# Patient Record
Sex: FEMALE | Race: BLACK OR AFRICAN AMERICAN | Employment: OTHER | URBAN - METROPOLITAN AREA
[De-identification: names, ages, dates, MRNs, and addresses within clinical notes are randomized per-mention and may not be internally consistent; named-entity substitution may affect disease eponyms.]

---

## 2017-01-12 ENCOUNTER — APPOINTMENT (OUTPATIENT)
Dept: GENERAL RADIOLOGY | Facility: HOSPITAL | Age: 82
End: 2017-01-12
Attending: EMERGENCY MEDICINE
Payer: MEDICARE

## 2017-01-12 ENCOUNTER — HOSPITAL ENCOUNTER (EMERGENCY)
Facility: HOSPITAL | Age: 82
Discharge: HOME | End: 2017-01-12
Attending: EMERGENCY MEDICINE
Payer: MEDICARE

## 2017-01-12 VITALS
WEIGHT: 167 LBS | BODY MASS INDEX: 30.73 KG/M2 | SYSTOLIC BLOOD PRESSURE: 132 MMHG | DIASTOLIC BLOOD PRESSURE: 68 MMHG | RESPIRATION RATE: 18 BRPM | TEMPERATURE: 98 F | HEART RATE: 74 BPM | HEIGHT: 62 IN | OXYGEN SATURATION: 95 %

## 2017-01-12 DIAGNOSIS — M10.9 ACUTE GOUT INVOLVING TOE OF RIGHT FOOT, UNSPECIFIED CAUSE: ICD-10-CM

## 2017-01-12 DIAGNOSIS — I95.1 ORTHOSTASIS: Primary | ICD-10-CM

## 2017-01-12 LAB
ANION GAP SERPL CALC-SCNC: 13 MMOL/L (ref 0–18)
BACTERIA UR QL AUTO: NEGATIVE /HPF
BASOPHILS # BLD: 0.1 K/UL (ref 0–0.2)
BASOPHILS NFR BLD: 1 %
BILIRUB UR QL: NEGATIVE
BNP SERPL-MCNC: 930 PG/ML (ref 0–100)
BUN SERPL-MCNC: 20 MG/DL (ref 8–20)
BUN/CREAT SERPL: 13.8 (ref 10–20)
CALCIUM SERPL-MCNC: 9.2 MG/DL (ref 8.5–10.5)
CHLORIDE SERPL-SCNC: 107 MMOL/L (ref 95–110)
CO2 SERPL-SCNC: 21 MMOL/L (ref 22–32)
COLOR UR: YELLOW
CREAT SERPL-MCNC: 1.45 MG/DL (ref 0.5–1.5)
EOSINOPHIL # BLD: 0 K/UL (ref 0–0.7)
EOSINOPHIL NFR BLD: 0 %
ERYTHROCYTE [DISTWIDTH] IN BLOOD BY AUTOMATED COUNT: 13.2 % (ref 11–15)
GLUCOSE BLDC GLUCOMTR-MCNC: 169 MG/DL (ref 70–99)
GLUCOSE SERPL-MCNC: 180 MG/DL (ref 70–99)
GLUCOSE UR-MCNC: NEGATIVE MG/DL
HCT VFR BLD AUTO: 29.8 % (ref 35–48)
HGB BLD-MCNC: 9.5 G/DL (ref 12–16)
HGB UR QL STRIP.AUTO: NEGATIVE
HYALINE CASTS #/AREA URNS AUTO: 4 /LPF
LEUKOCYTE ESTERASE UR QL STRIP.AUTO: NEGATIVE
LYMPHOCYTES # BLD: 1.1 K/UL (ref 1–4)
LYMPHOCYTES NFR BLD: 8 %
MCH RBC QN AUTO: 27.1 PG (ref 27–32)
MCHC RBC AUTO-ENTMCNC: 31.8 G/DL (ref 32–37)
MCV RBC AUTO: 85.4 FL (ref 80–100)
MONOCYTES # BLD: 0.5 K/UL (ref 0–1)
MONOCYTES NFR BLD: 4 %
NEUTROPHILS # BLD AUTO: 11.5 K/UL (ref 1.8–7.7)
NEUTROPHILS NFR BLD: 87 %
NITRITE UR QL STRIP.AUTO: NEGATIVE
OSMOLALITY UR CALC.SUM OF ELEC: 299 MOSM/KG (ref 275–295)
PH UR: 5 [PH] (ref 5–8)
PLATELET # BLD AUTO: 221 K/UL (ref 140–400)
PMV BLD AUTO: 10.1 FL (ref 7.4–10.3)
POTASSIUM SERPL-SCNC: 4.8 MMOL/L (ref 3.3–5.1)
PROT UR-MCNC: 30 MG/DL
RBC # BLD AUTO: 3.48 M/UL (ref 3.7–5.4)
RBC #/AREA URNS AUTO: 1 /HPF
SODIUM SERPL-SCNC: 141 MMOL/L (ref 136–144)
SP GR UR STRIP: 1.02 (ref 1–1.03)
TROPONIN I SERPL-MCNC: 0.03 NG/ML (ref ?–0.03)
UROBILINOGEN UR STRIP-ACNC: <2
VIT C UR-MCNC: NEGATIVE MG/DL
WBC # BLD AUTO: 13.1 K/UL (ref 4–11)
WBC #/AREA URNS AUTO: 1 /HPF

## 2017-01-12 PROCEDURE — 82962 GLUCOSE BLOOD TEST: CPT

## 2017-01-12 PROCEDURE — 71010 XR CHEST AP PORTABLE  (CPT=71010): CPT

## 2017-01-12 PROCEDURE — 84484 ASSAY OF TROPONIN QUANT: CPT | Performed by: EMERGENCY MEDICINE

## 2017-01-12 PROCEDURE — 81001 URINALYSIS AUTO W/SCOPE: CPT | Performed by: EMERGENCY MEDICINE

## 2017-01-12 PROCEDURE — 93005 ELECTROCARDIOGRAM TRACING: CPT

## 2017-01-12 PROCEDURE — 99285 EMERGENCY DEPT VISIT HI MDM: CPT

## 2017-01-12 PROCEDURE — 83880 ASSAY OF NATRIURETIC PEPTIDE: CPT | Performed by: EMERGENCY MEDICINE

## 2017-01-12 PROCEDURE — 80048 BASIC METABOLIC PNL TOTAL CA: CPT | Performed by: EMERGENCY MEDICINE

## 2017-01-12 PROCEDURE — 93010 ELECTROCARDIOGRAM REPORT: CPT | Performed by: EMERGENCY MEDICINE

## 2017-01-12 PROCEDURE — 96360 HYDRATION IV INFUSION INIT: CPT

## 2017-01-12 PROCEDURE — 96361 HYDRATE IV INFUSION ADD-ON: CPT

## 2017-01-12 PROCEDURE — 85025 COMPLETE CBC W/AUTO DIFF WBC: CPT | Performed by: EMERGENCY MEDICINE

## 2017-01-12 RX ORDER — TRAMADOL HYDROCHLORIDE 50 MG/1
50 TABLET ORAL ONCE
Status: COMPLETED | OUTPATIENT
Start: 2017-01-12 | End: 2017-01-12

## 2017-01-12 RX ORDER — TRAMADOL HYDROCHLORIDE 50 MG/1
50 TABLET ORAL EVERY 8 HOURS PRN
Qty: 15 TABLET | Refills: 0 | Status: SHIPPED | OUTPATIENT
Start: 2017-01-12 | End: 2017-01-17

## 2017-01-12 RX ORDER — SODIUM CHLORIDE 9 MG/ML
INJECTION, SOLUTION INTRAVENOUS ONCE
Status: COMPLETED | OUTPATIENT
Start: 2017-01-12 | End: 2017-01-12

## 2017-01-12 RX ORDER — INDOMETHACIN 50 MG/1
50 CAPSULE ORAL
Qty: 15 CAPSULE | Refills: 0 | Status: SHIPPED | OUTPATIENT
Start: 2017-01-12 | End: 2017-01-17

## 2017-01-13 NOTE — ED PROVIDER NOTES
Patient Seen in: [facility] AND [facility] Emergency Department    History   Patient presents with:  Syncope (cardiovascular, neurologic)    Stated Complaint: syncope      HPI    81 yo F with PMH NIDDM, NICM (20% EF), HTN, HL, gout presenting for evaluation of zac Tartrate-Timolol (COMBIGAN) 0.2-0.5 % Ophthalmic Solution,  Apply  to eye.   latanoprost (XALATAN) 0.005 % Ophthalmic Solution,  Apply  to eye. aspirin (ECOTRIN LOW STRENGTH) 81 MG Oral Tab EC,  Take  by mouth.    Ferrous Gluconate 324 (38 FE) MG Oral Tab CO2 21 (*)     Calculated Osmolality 299 (*)     GFR, Non- 35 (*)     GFR, -American 42 (*)     All other components within normal limits   BNP (B TYPE NATRIUERTIC PEPTIDE) - Abnormal; Notable for the following:     Beta Natriuretic [facility], X CHEST PORTABLE, 10/28/2014, 15:35. [facility], X CHEST PA LAT ROUTINE, 10/22/2014, 11:25. INDICATIONS: Weakness and confusion today. TECHNIQUE:   Single view.    FINDINGS:  CARDIAC/VASC: The heart is taking these medications    TraMADol HCl 50 MG Oral Tab  Take 1 tablet (50 mg total) by mouth every 8 (eight) hours as needed for Pain.   Qty: 15 tablet Refills: 0

## 2017-01-13 NOTE — ED INITIAL ASSESSMENT (HPI)
Per ems, patient had a syncopal episode. Family assisted pt to ground. Hx of chf, stents, gout. Denies chest pain and sob.

## 2017-03-24 ENCOUNTER — HOSPITAL ENCOUNTER (EMERGENCY)
Facility: HOSPITAL | Age: 82
Discharge: HOME | End: 2017-03-24
Attending: EMERGENCY MEDICINE
Payer: MEDICARE

## 2017-03-24 VITALS
DIASTOLIC BLOOD PRESSURE: 65 MMHG | HEIGHT: 62 IN | BODY MASS INDEX: 29.44 KG/M2 | HEART RATE: 83 BPM | OXYGEN SATURATION: 100 % | WEIGHT: 160 LBS | TEMPERATURE: 97 F | RESPIRATION RATE: 20 BRPM | SYSTOLIC BLOOD PRESSURE: 134 MMHG

## 2017-03-24 DIAGNOSIS — M62.830 SPASM OF THORACIC BACK MUSCLE: Primary | ICD-10-CM

## 2017-03-24 PROCEDURE — 99282 EMERGENCY DEPT VISIT SF MDM: CPT

## 2017-03-24 RX ORDER — ACETAMINOPHEN 325 MG/1
650 TABLET ORAL ONCE
Status: COMPLETED | OUTPATIENT
Start: 2017-03-24 | End: 2017-03-24

## 2017-03-24 RX ORDER — IBUPROFEN 400 MG/1
200 TABLET ORAL ONCE
Status: COMPLETED | OUTPATIENT
Start: 2017-03-24 | End: 2017-03-24

## 2017-03-24 NOTE — ED PROVIDER NOTES
Patient Seen in: [facility] AND [facility] Emergency Department    History   Patient presents with:  Upper Extremity Injury (musculoskeletal)      HPI    The patient presents complaining of pain in her right shoulder for the past 5 days.   She states pain started Gastrointestinal: Negative for vomiting and abdominal pain. Genitourinary: Negative for dysuria and hematuria. Musculoskeletal: Positive for back pain. Negative for neck pain. Skin: Negative for rash and wound.    Neurological: Negative for syncope and discussed massage and warm compresses at home. She will follow-up with her PMD and return if worse.       Disposition and Plan     Clinical Impression:  Spasm of thoracic back muscle  (primary encounter diagnosis)    Disposition:  Discharge    Follow-u

## 2017-04-03 ENCOUNTER — PRIOR ORIGINAL RECORDS (OUTPATIENT)
Dept: OTHER | Age: 82
End: 2017-04-03

## 2017-04-20 ENCOUNTER — LAB ENCOUNTER (OUTPATIENT)
Dept: LAB | Facility: HOSPITAL | Age: 82
End: 2017-04-20
Attending: INTERNAL MEDICINE
Payer: MEDICARE

## 2017-04-20 DIAGNOSIS — G62.9 PERIPHERAL NEUROPATHY: ICD-10-CM

## 2017-04-20 DIAGNOSIS — E11.21 DIABETIC GLOMERULOPATHY (HCC): Primary | ICD-10-CM

## 2017-04-20 DIAGNOSIS — R53.83 FATIGUE: ICD-10-CM

## 2017-04-20 PROCEDURE — 36415 COLL VENOUS BLD VENIPUNCTURE: CPT

## 2017-04-20 PROCEDURE — 80053 COMPREHEN METABOLIC PANEL: CPT

## 2017-04-20 PROCEDURE — 85025 COMPLETE CBC W/AUTO DIFF WBC: CPT

## 2017-04-20 PROCEDURE — 82607 VITAMIN B-12: CPT

## 2017-04-20 PROCEDURE — 82746 ASSAY OF FOLIC ACID SERUM: CPT

## 2017-04-20 PROCEDURE — 84443 ASSAY THYROID STIM HORMONE: CPT

## 2017-04-20 PROCEDURE — 83036 HEMOGLOBIN GLYCOSYLATED A1C: CPT

## 2017-07-26 ENCOUNTER — APPOINTMENT (OUTPATIENT)
Dept: CV DIAGNOSTICS | Facility: HOSPITAL | Age: 82
DRG: 551 | End: 2017-07-26
Attending: INTERNAL MEDICINE
Payer: MEDICARE

## 2017-07-26 ENCOUNTER — APPOINTMENT (OUTPATIENT)
Dept: CT IMAGING | Facility: HOSPITAL | Age: 82
DRG: 551 | End: 2017-07-26
Attending: EMERGENCY MEDICINE
Payer: MEDICARE

## 2017-07-26 ENCOUNTER — HOSPITAL ENCOUNTER (INPATIENT)
Facility: HOSPITAL | Age: 82
LOS: 9 days | Discharge: HOME | DRG: 551 | End: 2017-08-04
Attending: EMERGENCY MEDICINE | Admitting: INTERNAL MEDICINE
Payer: MEDICARE

## 2017-07-26 ENCOUNTER — APPOINTMENT (OUTPATIENT)
Dept: ULTRASOUND IMAGING | Facility: HOSPITAL | Age: 82
DRG: 551 | End: 2017-07-26
Attending: EMERGENCY MEDICINE
Payer: MEDICARE

## 2017-07-26 ENCOUNTER — APPOINTMENT (OUTPATIENT)
Dept: GENERAL RADIOLOGY | Facility: HOSPITAL | Age: 82
DRG: 551 | End: 2017-07-26
Attending: EMERGENCY MEDICINE
Payer: MEDICARE

## 2017-07-26 DIAGNOSIS — M54.6 ACUTE RIGHT-SIDED THORACIC BACK PAIN: ICD-10-CM

## 2017-07-26 DIAGNOSIS — I25.10 CARDIOVASCULAR DISEASE: ICD-10-CM

## 2017-07-26 DIAGNOSIS — R10.9 RIGHT SIDED ABDOMINAL PAIN: Primary | ICD-10-CM

## 2017-07-26 LAB
ALBUMIN SERPL BCP-MCNC: 3.8 G/DL (ref 3.5–4.8)
ALP SERPL-CCNC: 69 U/L (ref 32–100)
ALT SERPL-CCNC: 10 U/L (ref 14–54)
ANION GAP SERPL CALC-SCNC: 9 MMOL/L (ref 0–18)
AST SERPL-CCNC: 15 U/L (ref 15–41)
BACTERIA UR QL AUTO: NEGATIVE /HPF
BASOPHILS # BLD: 0.1 K/UL (ref 0–0.2)
BASOPHILS NFR BLD: 1 %
BILIRUB DIRECT SERPL-MCNC: 0.1 MG/DL (ref 0–0.2)
BILIRUB SERPL-MCNC: 0.6 MG/DL (ref 0.3–1.2)
BILIRUB UR QL: NEGATIVE
BUN SERPL-MCNC: 29 MG/DL (ref 8–20)
BUN/CREAT SERPL: 20 (ref 10–20)
CALCIUM SERPL-MCNC: 9.4 MG/DL (ref 8.5–10.5)
CHLORIDE SERPL-SCNC: 107 MMOL/L (ref 95–110)
CHOLEST SERPL-MCNC: 188 MG/DL (ref 110–200)
CLARITY UR: CLEAR
CO2 SERPL-SCNC: 25 MMOL/L (ref 22–32)
COLOR UR: COLORLESS
CREAT SERPL-MCNC: 1.45 MG/DL (ref 0.5–1.5)
EOSINOPHIL # BLD: 0.1 K/UL (ref 0–0.7)
EOSINOPHIL NFR BLD: 1 %
ERYTHROCYTE [DISTWIDTH] IN BLOOD BY AUTOMATED COUNT: 14.1 % (ref 11–15)
GLUCOSE BLDC GLUCOMTR-MCNC: 147 MG/DL (ref 70–99)
GLUCOSE BLDC GLUCOMTR-MCNC: 204 MG/DL (ref 70–99)
GLUCOSE SERPL-MCNC: 171 MG/DL (ref 70–99)
GLUCOSE UR-MCNC: NEGATIVE MG/DL
HCT VFR BLD AUTO: 33.2 % (ref 35–48)
HDLC SERPL-MCNC: 42 MG/DL
HGB BLD-MCNC: 10.6 G/DL (ref 12–16)
HGB UR QL STRIP.AUTO: NEGATIVE
LDLC SERPL CALC-MCNC: 120 MG/DL (ref 0–99)
LIPASE SERPL-CCNC: 21 U/L (ref 22–51)
LYMPHOCYTES # BLD: 1.3 K/UL (ref 1–4)
LYMPHOCYTES NFR BLD: 15 %
MCH RBC QN AUTO: 27.2 PG (ref 27–32)
MCHC RBC AUTO-ENTMCNC: 32 G/DL (ref 32–37)
MCV RBC AUTO: 85.2 FL (ref 80–100)
MONOCYTES # BLD: 0.6 K/UL (ref 0–1)
MONOCYTES NFR BLD: 7 %
NEUTROPHILS # BLD AUTO: 6.6 K/UL (ref 1.8–7.7)
NEUTROPHILS NFR BLD: 76 %
NITRITE UR QL STRIP.AUTO: NEGATIVE
NONHDLC SERPL-MCNC: 146 MG/DL
OSMOLALITY UR CALC.SUM OF ELEC: 302 MOSM/KG (ref 275–295)
PH UR: 7 [PH] (ref 5–8)
PLATELET # BLD AUTO: 245 K/UL (ref 140–400)
PMV BLD AUTO: 10 FL (ref 7.4–10.3)
POTASSIUM SERPL-SCNC: 4.1 MMOL/L (ref 3.3–5.1)
PROT SERPL-MCNC: 8.4 G/DL (ref 5.9–8.4)
PROT UR-MCNC: 30 MG/DL
RBC # BLD AUTO: 3.9 M/UL (ref 3.7–5.4)
RBC #/AREA URNS AUTO: <1 /HPF
SODIUM SERPL-SCNC: 141 MMOL/L (ref 136–144)
SP GR UR STRIP: 1.01 (ref 1–1.03)
TRIGL SERPL-MCNC: 129 MG/DL (ref 1–149)
TROPONIN I SERPL-MCNC: 0.02 NG/ML (ref ?–0.03)
TROPONIN I SERPL-MCNC: 0.05 NG/ML (ref ?–0.03)
UROBILINOGEN UR STRIP-ACNC: <2
VIT C UR-MCNC: NEGATIVE MG/DL
WBC # BLD AUTO: 8.7 K/UL (ref 4–11)
WBC #/AREA URNS AUTO: 14 /HPF

## 2017-07-26 PROCEDURE — 93010 ELECTROCARDIOGRAM REPORT: CPT | Performed by: EMERGENCY MEDICINE

## 2017-07-26 PROCEDURE — 80061 LIPID PANEL: CPT | Performed by: EMERGENCY MEDICINE

## 2017-07-26 PROCEDURE — 93010 ELECTROCARDIOGRAM REPORT: CPT | Performed by: INTERNAL MEDICINE

## 2017-07-26 PROCEDURE — 87086 URINE CULTURE/COLONY COUNT: CPT | Performed by: EMERGENCY MEDICINE

## 2017-07-26 PROCEDURE — 81001 URINALYSIS AUTO W/SCOPE: CPT | Performed by: EMERGENCY MEDICINE

## 2017-07-26 PROCEDURE — 93306 TTE W/DOPPLER COMPLETE: CPT | Performed by: INTERNAL MEDICINE

## 2017-07-26 PROCEDURE — 84484 ASSAY OF TROPONIN QUANT: CPT | Performed by: EMERGENCY MEDICINE

## 2017-07-26 PROCEDURE — 83690 ASSAY OF LIPASE: CPT | Performed by: EMERGENCY MEDICINE

## 2017-07-26 PROCEDURE — 74176 CT ABD & PELVIS W/O CONTRAST: CPT | Performed by: EMERGENCY MEDICINE

## 2017-07-26 PROCEDURE — 80076 HEPATIC FUNCTION PANEL: CPT | Performed by: EMERGENCY MEDICINE

## 2017-07-26 PROCEDURE — 99285 EMERGENCY DEPT VISIT HI MDM: CPT

## 2017-07-26 PROCEDURE — 71010 XR CHEST AP PORTABLE  (CPT=71010): CPT | Performed by: EMERGENCY MEDICINE

## 2017-07-26 PROCEDURE — 82962 GLUCOSE BLOOD TEST: CPT

## 2017-07-26 PROCEDURE — 96374 THER/PROPH/DIAG INJ IV PUSH: CPT

## 2017-07-26 PROCEDURE — 71250 CT THORAX DX C-: CPT | Performed by: EMERGENCY MEDICINE

## 2017-07-26 PROCEDURE — 93005 ELECTROCARDIOGRAM TRACING: CPT

## 2017-07-26 PROCEDURE — 76770 US EXAM ABDO BACK WALL COMP: CPT | Performed by: EMERGENCY MEDICINE

## 2017-07-26 PROCEDURE — 85025 COMPLETE CBC W/AUTO DIFF WBC: CPT | Performed by: EMERGENCY MEDICINE

## 2017-07-26 PROCEDURE — 80048 BASIC METABOLIC PNL TOTAL CA: CPT | Performed by: EMERGENCY MEDICINE

## 2017-07-26 RX ORDER — HYDROMORPHONE HYDROCHLORIDE 1 MG/ML
0.4 INJECTION, SOLUTION INTRAMUSCULAR; INTRAVENOUS; SUBCUTANEOUS
Status: DISCONTINUED | OUTPATIENT
Start: 2017-07-26 | End: 2017-08-04

## 2017-07-26 RX ORDER — SPIRONOLACTONE 25 MG/1
25 TABLET ORAL DAILY
Status: DISCONTINUED | OUTPATIENT
Start: 2017-07-26 | End: 2017-07-28

## 2017-07-26 RX ORDER — DOCUSATE SODIUM 100 MG/1
100 CAPSULE, LIQUID FILLED ORAL 2 TIMES DAILY
Status: DISCONTINUED | OUTPATIENT
Start: 2017-07-26 | End: 2017-08-04

## 2017-07-26 RX ORDER — SODIUM CHLORIDE 0.9 % (FLUSH) 0.9 %
3 SYRINGE (ML) INJECTION AS NEEDED
Status: DISCONTINUED | OUTPATIENT
Start: 2017-07-26 | End: 2017-08-04

## 2017-07-26 RX ORDER — ONDANSETRON 2 MG/ML
4 INJECTION INTRAMUSCULAR; INTRAVENOUS EVERY 6 HOURS PRN
Status: DISCONTINUED | OUTPATIENT
Start: 2017-07-26 | End: 2017-08-04

## 2017-07-26 RX ORDER — ALBUTEROL SULFATE 90 UG/1
2 AEROSOL, METERED RESPIRATORY (INHALATION) EVERY 4 HOURS PRN
Status: DISCONTINUED | OUTPATIENT
Start: 2017-07-26 | End: 2017-08-04

## 2017-07-26 RX ORDER — PRAVASTATIN SODIUM 20 MG
20 TABLET ORAL NIGHTLY
Status: DISCONTINUED | OUTPATIENT
Start: 2017-07-26 | End: 2017-07-27

## 2017-07-26 RX ORDER — FUROSEMIDE 10 MG/ML
20 INJECTION INTRAMUSCULAR; INTRAVENOUS ONCE
Status: COMPLETED | OUTPATIENT
Start: 2017-07-26 | End: 2017-07-26

## 2017-07-26 RX ORDER — HEPARIN SODIUM 5000 [USP'U]/ML
5000 INJECTION, SOLUTION INTRAVENOUS; SUBCUTANEOUS EVERY 12 HOURS SCHEDULED
Status: DISCONTINUED | OUTPATIENT
Start: 2017-07-26 | End: 2017-08-04

## 2017-07-26 RX ORDER — HYDROCODONE BITARTRATE AND ACETAMINOPHEN 5; 325 MG/1; MG/1
1 TABLET ORAL EVERY 6 HOURS PRN
Status: DISCONTINUED | OUTPATIENT
Start: 2017-07-26 | End: 2017-08-04

## 2017-07-26 RX ORDER — SODIUM PHOSPHATE, DIBASIC AND SODIUM PHOSPHATE, MONOBASIC 7; 19 G/133ML; G/133ML
1 ENEMA RECTAL ONCE AS NEEDED
Status: DISCONTINUED | OUTPATIENT
Start: 2017-07-26 | End: 2017-07-26

## 2017-07-26 RX ORDER — CARVEDILOL 25 MG/1
25 TABLET ORAL 2 TIMES DAILY WITH MEALS
Status: DISCONTINUED | OUTPATIENT
Start: 2017-07-26 | End: 2017-08-04

## 2017-07-26 RX ORDER — 0.9 % SODIUM CHLORIDE 0.9 %
VIAL (ML) INJECTION
Status: COMPLETED
Start: 2017-07-26 | End: 2017-07-26

## 2017-07-26 RX ORDER — BISACODYL 10 MG
10 SUPPOSITORY, RECTAL RECTAL
Status: DISCONTINUED | OUTPATIENT
Start: 2017-07-26 | End: 2017-08-04

## 2017-07-26 RX ORDER — CLOPIDOGREL BISULFATE 75 MG/1
75 TABLET ORAL DAILY
Status: DISCONTINUED | OUTPATIENT
Start: 2017-07-26 | End: 2017-07-29

## 2017-07-26 RX ORDER — POLYETHYLENE GLYCOL 3350 17 G/17G
17 POWDER, FOR SOLUTION ORAL DAILY PRN
Status: DISCONTINUED | OUTPATIENT
Start: 2017-07-26 | End: 2017-08-04

## 2017-07-26 RX ORDER — HYDRALAZINE HYDROCHLORIDE 50 MG/1
50 TABLET, FILM COATED ORAL
Status: DISCONTINUED | OUTPATIENT
Start: 2017-07-26 | End: 2017-07-28

## 2017-07-26 RX ORDER — LISINOPRIL 20 MG/1
20 TABLET ORAL 2 TIMES DAILY
Status: DISCONTINUED | OUTPATIENT
Start: 2017-07-26 | End: 2017-07-28

## 2017-07-26 RX ORDER — LATANOPROST 50 UG/ML
1 SOLUTION/ DROPS OPHTHALMIC NIGHTLY
Status: DISCONTINUED | OUTPATIENT
Start: 2017-07-26 | End: 2017-07-26

## 2017-07-26 RX ORDER — PANTOPRAZOLE SODIUM 20 MG/1
20 TABLET, DELAYED RELEASE ORAL
Status: DISCONTINUED | OUTPATIENT
Start: 2017-07-27 | End: 2017-08-04

## 2017-07-26 RX ORDER — TORSEMIDE 20 MG/1
20 TABLET ORAL DAILY
Status: DISCONTINUED | OUTPATIENT
Start: 2017-07-26 | End: 2017-07-28

## 2017-07-26 RX ORDER — ASPIRIN 81 MG/1
81 TABLET ORAL DAILY
Status: DISCONTINUED | OUTPATIENT
Start: 2017-07-26 | End: 2017-07-29

## 2017-07-26 RX ORDER — DEXTROSE MONOHYDRATE 25 G/50ML
50 INJECTION, SOLUTION INTRAVENOUS AS NEEDED
Status: DISCONTINUED | OUTPATIENT
Start: 2017-07-26 | End: 2017-08-04

## 2017-07-26 NOTE — PROGRESS NOTES
07/26/17 1057   Clinical Encounter Type   Visited With Patient and family together   Continue Visiting Yes   Crisis Visit ED  (Cardiac Alert)   Patient's Supportive Strategies/Resources Daughter, Spouse, Family   Yarsani Encounters   Yarsani Needs P

## 2017-07-26 NOTE — HISTORICAL OFFICE NOTE
Draa Ndiaye  : 1935  ACCOUNT:  [id number]  [phone]  PCP: Dr. Jennifer Rice     TODAY'S DATE: 10/24/2016  DICTATED BY:  Kirstie Lim M.D.]    CHIEF COMPLAINT: [Followup of .  Heart failure, systolic, Followup of Cardiomyopathy and ischemic.]    H 132/70 , Pulse - 80, Respiration - 20, Weight -  167, Height -   61 , BMI - 31.6 ]    CONS: well developed, well nourished. WEIGHT: BMI parameters reviewed and discussed. HEAD/FACE: no trauma and normocephalic.  EYES: conjunctivae not injected and no xanthe Start    Med Name              Dose      Instructions                             05/19/16 *Simvastatin          80MG      1 TABLET DAILY AT BEDTIME.               02/22/16 *Clopidogrel [id number]      1 TABLET DAILY                           02/16/16 Systolic function was severely reduced. The estimated ejection fraction was 30-35%. Doppler  parameters are consistent with abnormal left ventricular relaxation (grade 1 diastolic dysfunction). ·  Aortic valve: Mild to moderate regurgitation.   ·  Mitral v

## 2017-07-26 NOTE — CONSULTS
[facility]    Report of Consultation    Wilfredo Torres Patient Status:  Emergency    1935 MRN [id number]   Location [address] Attending Daisy Samuel MD   Hosp Day # 0 PCP Yvonne Dai MD     Date of A and T-wave abnormality mild ST depression and T-wave inversion in lead 1 and aVL cannot exclude ischemia. When compared with serial EKGs no significant change.   Possible right ventricular hypertrophy blood     Past Medical History:   Diagnosis Date   • [facility] AND [facility] and her pulse is 97. Her respiration is 20 and oxygen saturation is 97%. current /81  Physical Exam patient is awake alert oriented ×3. Complains of right sided abdominal pain with movement. No chest pain. Breathing comfortably.   No jugular venous with pulmonary pressures on echo  6. Anemia per PCP  7.  diabetes with elevated glucose per PCP  8. Hypertension-resume home cardiac medications if blood pressure remains elevated will need further medication adjustments  9.   Hyperlipidemia continue stat

## 2017-07-26 NOTE — ED NOTES
Patient complains of RLQ with movement, unable to turn in bed due to pain, states she is unable to walk due to pain, dr Federico Muller aware, no orders received

## 2017-07-26 NOTE — ED NOTES
Patient states she urinated in her \"depends\", family member states she took soiled depends off patient, this nurse will attempt straight cath later to obtain urine sample

## 2017-07-27 ENCOUNTER — APPOINTMENT (OUTPATIENT)
Dept: MRI IMAGING | Facility: HOSPITAL | Age: 82
DRG: 551 | End: 2017-07-27
Attending: INTERNAL MEDICINE
Payer: MEDICARE

## 2017-07-27 LAB
ANION GAP SERPL CALC-SCNC: 11 MMOL/L (ref 0–18)
BASOPHILS # BLD: 0.1 K/UL (ref 0–0.2)
BASOPHILS NFR BLD: 1 %
BUN SERPL-MCNC: 28 MG/DL (ref 8–20)
BUN/CREAT SERPL: 14.4 (ref 10–20)
CALCIUM SERPL-MCNC: 9.1 MG/DL (ref 8.5–10.5)
CHLORIDE SERPL-SCNC: 101 MMOL/L (ref 95–110)
CO2 SERPL-SCNC: 28 MMOL/L (ref 22–32)
CREAT SERPL-MCNC: 1.95 MG/DL (ref 0.5–1.5)
EOSINOPHIL # BLD: 0.1 K/UL (ref 0–0.7)
EOSINOPHIL NFR BLD: 1 %
ERYTHROCYTE [DISTWIDTH] IN BLOOD BY AUTOMATED COUNT: 13.9 % (ref 11–15)
GLUCOSE BLDC GLUCOMTR-MCNC: 146 MG/DL (ref 70–99)
GLUCOSE BLDC GLUCOMTR-MCNC: 154 MG/DL (ref 70–99)
GLUCOSE BLDC GLUCOMTR-MCNC: 183 MG/DL (ref 70–99)
GLUCOSE BLDC GLUCOMTR-MCNC: 207 MG/DL (ref 70–99)
GLUCOSE SERPL-MCNC: 149 MG/DL (ref 70–99)
HBA1C MFR BLD: 6.8 % (ref 4–6)
HCT VFR BLD AUTO: 31.5 % (ref 35–48)
HGB BLD-MCNC: 10.2 G/DL (ref 12–16)
LYMPHOCYTES # BLD: 1.1 K/UL (ref 1–4)
LYMPHOCYTES NFR BLD: 10 %
MCH RBC QN AUTO: 27.5 PG (ref 27–32)
MCHC RBC AUTO-ENTMCNC: 32.4 G/DL (ref 32–37)
MCV RBC AUTO: 84.9 FL (ref 80–100)
MONOCYTES # BLD: 0.9 K/UL (ref 0–1)
MONOCYTES NFR BLD: 8 %
NEUTROPHILS # BLD AUTO: 9.1 K/UL (ref 1.8–7.7)
NEUTROPHILS NFR BLD: 80 %
OSMOLALITY UR CALC.SUM OF ELEC: 298 MOSM/KG (ref 275–295)
PLATELET # BLD AUTO: 233 K/UL (ref 140–400)
PMV BLD AUTO: 9.9 FL (ref 7.4–10.3)
POTASSIUM SERPL-SCNC: 4 MMOL/L (ref 3.3–5.1)
RBC # BLD AUTO: 3.71 M/UL (ref 3.7–5.4)
SODIUM SERPL-SCNC: 140 MMOL/L (ref 136–144)
TROPONIN I SERPL-MCNC: 0.04 NG/ML (ref ?–0.03)
WBC # BLD AUTO: 11.4 K/UL (ref 4–11)

## 2017-07-27 PROCEDURE — 85025 COMPLETE CBC W/AUTO DIFF WBC: CPT | Performed by: INTERNAL MEDICINE

## 2017-07-27 PROCEDURE — 84484 ASSAY OF TROPONIN QUANT: CPT | Performed by: INTERNAL MEDICINE

## 2017-07-27 PROCEDURE — 80048 BASIC METABOLIC PNL TOTAL CA: CPT | Performed by: INTERNAL MEDICINE

## 2017-07-27 PROCEDURE — 82962 GLUCOSE BLOOD TEST: CPT

## 2017-07-27 PROCEDURE — 72148 MRI LUMBAR SPINE W/O DYE: CPT | Performed by: INTERNAL MEDICINE

## 2017-07-27 PROCEDURE — 83036 HEMOGLOBIN GLYCOSYLATED A1C: CPT | Performed by: INTERNAL MEDICINE

## 2017-07-27 RX ORDER — ATORVASTATIN CALCIUM 20 MG/1
20 TABLET, FILM COATED ORAL NIGHTLY
Status: DISCONTINUED | OUTPATIENT
Start: 2017-07-27 | End: 2017-08-04

## 2017-07-27 NOTE — PROGRESS NOTES
[facility]  Cardiology Progress Note    Chad Dodd Patient Status:  Inpatient    1935 MRN [id number]   Location [facility] 3W/SW Attending Allison Chow MD   Hosp Day # 1 PCP Patti Tran MD       Assessment and Plan: Hyperlipidemia     Congestive heart failure (HCC)     CKD (chronic kidney disease) stage 3, GFR 30-59 ml/min     Chronic kidney disease, stage III (moderate)     Right sided abdominal pain      Objective:   Temp: 98.1 °F (36.7 °C)  Pulse: 77  Resp: 20  BP: with meals   Clopidogrel Bisulfate (PLAVIX) tab 75 mg 75 mg Oral Daily   lisinopril (PRINIVIL,ZESTRIL) tab 20 mg 20 mg Oral BID   Pantoprazole Sodium (PROTONIX) EC tab 20 mg 20 mg Oral QAM AC   SITagliptin Phosphate (JANUVIA) tab 25 mg 25 mg Oral Daily   s

## 2017-07-27 NOTE — PLAN OF CARE
Patient/Family Goals    • Patient/Family Long Term Goal Progressing    • Patient/Family Short Term Goal Progressing        SAFETY ADULT - FALL    • Free from fall injury Progressing          PAIN - ADULT    • Verbalizes/displays adequate comfort level or p

## 2017-07-27 NOTE — H&P
[facility]    PATIENT'S NAME: [facility]   ATTENDING PHYSICIAN: Stevie Chua MD   PATIENT ACCOUNT#:   [id number]    LOCATION:  [address] [place] [address] #:   [id number]       YOB: 1935  ADMISSION DATE:       07/26/201 mg twice a day. ALLERGIES:  Penicillin, meloxicam, and mushrooms. FAMILY HISTORY:  The father  in his 46s and was an alcoholic. Mother  at 32 secondary to a goiter.     SOCIAL HISTORY:  The patient had remote tobacco usage but has not done th consultation has been obtained. We will check MRI of the lumbar spine and have Pain Clinic see the patient in consultation. Further recommendations pending clinical course. Dictated By Felix Ayers MD  d: 07/27/2017 06:51:07  t: 07/27/2017 06:58:

## 2017-07-27 NOTE — PLAN OF CARE
PAIN - ADULT    • Verbalizes/displays adequate comfort level or patient's stated pain goal  RIGHT ABD PAIN. PAIN MEDICATION AS ORDERED.   Progressing        Patient/Family Goals    • Patient/Family Long Term Goal  FREE OF PAIN Progressing    • Patient/Famil

## 2017-07-27 NOTE — PROGRESS NOTES
[facility]  Report of Consultation    Dereck Irwin Patient Status:  Inpatient    1935 MRN [id number]   Location [facility] 3W/SW Attending Santino Madrigal MD   Hosp Day # 1 PCP Kuldip Barros MD     Date of Admission:  / Inhalation, Q4H PRN  •  carvedilol (COREG) tab 25 mg, 25 mg, Oral, BID with meals  •  Clopidogrel Bisulfate (PLAVIX) tab 75 mg, 75 mg, Oral, Daily  •  lisinopril (PRINIVIL,ZESTRIL) tab 20 mg, 20 mg, Oral, BID  •  Pantoprazole Sodium (PROTONIX) EC tab 20 mg WBC 8.7 07/26/2017   HGB 10.6 07/26/2017   HCT 33.2 07/26/2017    07/26/2017   CREATSERUM 1.45 07/26/2017   BUN 29 07/26/2017    07/26/2017   K 4.1 07/26/2017    07/26/2017   CO2 25 07/26/2017    07/26/2017   CA 9.4 07/26/2017 gross mass or abnormality. PANCREAS:                No lesion, fluid collection, ductal dilatation, or atrophy. BILIARY:                      Cholelithiasis. No biliary ductal dilatation. ADRENALS:                No mass or enlargement.     KIDNEYS: sided abdominal pain    Myofascial pain r/o lumbar ss    Recommendations:  TPIs today  Start medrol dose pack today   Agree with MRI to rule out lumbar disease   Not a candidate for SIA at this time due to plavix  Will folow

## 2017-07-27 NOTE — ED PROVIDER NOTES
Patient Seen in: [facility] 3w/sw    History   Patient presents with:  Abdomen/Flank Pain (GI/)  Chest Pain Angina (cardiovascular)    Stated Complaint: cardiac alert     HPI    80year old female with a past medical history of CAD, CHF, type 2 SitaGLIPtin Phosphate (JANUVIA) 25 MG Oral Tab,  Take 25 mg by mouth daily. Brimonidine Tartrate-Timolol (COMBIGAN) 0.2-0.5 % Ophthalmic Solution,  Apply  to eye.   latanoprost (XALATAN) 0.005 % Ophthalmic Solution,  Apply  to eye.    aspirin (ECOTRIN LO Cardiovascular: Normal rate, regular rhythm, normal heart sounds and intact distal pulses. No murmur heard. Pulmonary/Chest: Effort normal and breath sounds normal. No respiratory distress. Abdominal: Soft. She exhibits no distension.  There is no ten CBC W/ DIFFERENTIAL - Abnormal; Notable for the following:     HGB 10.6 (*)     HCT 33.2 (*)     All other components within normal limits   TROPONIN I, 0 HOUR - Normal   CBC WITH DIFFERENTIAL WITH PLATELET    Narrative:      The following orders were creat Radiology exams  Viewed and reviewed by myself and findings discussed with patient including need for follow up  --------------------    The patient was seen immediately by cardiology as she was a cardiac alert, was determined that she did not have ST elev

## 2017-07-28 ENCOUNTER — APPOINTMENT (OUTPATIENT)
Dept: ULTRASOUND IMAGING | Facility: HOSPITAL | Age: 82
DRG: 551 | End: 2017-07-28
Attending: INTERNAL MEDICINE
Payer: MEDICARE

## 2017-07-28 ENCOUNTER — APPOINTMENT (OUTPATIENT)
Dept: GENERAL RADIOLOGY | Facility: HOSPITAL | Age: 82
DRG: 551 | End: 2017-07-28
Attending: NURSE PRACTITIONER
Payer: MEDICARE

## 2017-07-28 LAB
ANION GAP SERPL CALC-SCNC: 9 MMOL/L (ref 0–18)
BILIRUB UR QL: NEGATIVE
BUN SERPL-MCNC: 42 MG/DL (ref 8–20)
BUN/CREAT SERPL: 12.1 (ref 10–20)
CALCIUM SERPL-MCNC: 8.3 MG/DL (ref 8.5–10.5)
CHLORIDE SERPL-SCNC: 100 MMOL/L (ref 95–110)
CO2 SERPL-SCNC: 26 MMOL/L (ref 22–32)
COLOR UR: YELLOW
CREAT SERPL-MCNC: 3.48 MG/DL (ref 0.5–1.5)
ERYTHROCYTE [DISTWIDTH] IN BLOOD BY AUTOMATED COUNT: 14 % (ref 11–15)
GLUCOSE BLDC GLUCOMTR-MCNC: 143 MG/DL (ref 70–99)
GLUCOSE BLDC GLUCOMTR-MCNC: 162 MG/DL (ref 70–99)
GLUCOSE BLDC GLUCOMTR-MCNC: 197 MG/DL (ref 70–99)
GLUCOSE BLDC GLUCOMTR-MCNC: 205 MG/DL (ref 70–99)
GLUCOSE SERPL-MCNC: 143 MG/DL (ref 70–99)
GLUCOSE UR-MCNC: NEGATIVE MG/DL
HCT VFR BLD AUTO: 28.7 % (ref 35–48)
HGB BLD-MCNC: 9.2 G/DL (ref 12–16)
HYALINE CASTS #/AREA URNS AUTO: 3 /LPF
KETONES UR-MCNC: NEGATIVE MG/DL
MCH RBC QN AUTO: 27.2 PG (ref 27–32)
MCHC RBC AUTO-ENTMCNC: 32.2 G/DL (ref 32–37)
MCV RBC AUTO: 84.5 FL (ref 80–100)
NITRITE UR QL STRIP.AUTO: NEGATIVE
OSMOLALITY UR CALC.SUM OF ELEC: 293 MOSM/KG (ref 275–295)
PH UR: 5 [PH] (ref 5–8)
PLATELET # BLD AUTO: 207 K/UL (ref 140–400)
PMV BLD AUTO: 9.8 FL (ref 7.4–10.3)
POTASSIUM SERPL-SCNC: 4.4 MMOL/L (ref 3.3–5.1)
PROT UR-MCNC: 30 MG/DL
RBC # BLD AUTO: 3.4 M/UL (ref 3.7–5.4)
RBC #/AREA URNS AUTO: 103 /HPF
SODIUM SERPL-SCNC: 135 MMOL/L (ref 136–144)
SP GR UR STRIP: 1.01 (ref 1–1.03)
UROBILINOGEN UR STRIP-ACNC: <2
VIT C UR-MCNC: NEGATIVE MG/DL
WBC # BLD AUTO: 11.5 K/UL (ref 4–11)
WBC #/AREA URNS AUTO: 54 /HPF

## 2017-07-28 PROCEDURE — 80048 BASIC METABOLIC PNL TOTAL CA: CPT | Performed by: INTERNAL MEDICINE

## 2017-07-28 PROCEDURE — 81001 URINALYSIS AUTO W/SCOPE: CPT | Performed by: INTERNAL MEDICINE

## 2017-07-28 PROCEDURE — 85027 COMPLETE CBC AUTOMATED: CPT | Performed by: INTERNAL MEDICINE

## 2017-07-28 PROCEDURE — 82962 GLUCOSE BLOOD TEST: CPT

## 2017-07-28 PROCEDURE — 72040 X-RAY EXAM NECK SPINE 2-3 VW: CPT | Performed by: NURSE PRACTITIONER

## 2017-07-28 PROCEDURE — 76770 US EXAM ABDO BACK WALL COMP: CPT | Performed by: INTERNAL MEDICINE

## 2017-07-28 PROCEDURE — 72072 X-RAY EXAM THORAC SPINE 3VWS: CPT | Performed by: NURSE PRACTITIONER

## 2017-07-28 RX ORDER — METHOCARBAMOL 500 MG/1
500 TABLET, FILM COATED ORAL 2 TIMES DAILY PRN
Status: DISCONTINUED | OUTPATIENT
Start: 2017-07-28 | End: 2017-08-04

## 2017-07-28 NOTE — PROGRESS NOTES
[facility]    Progress Note    Carli Won Patient Status:  Inpatient    1935 MRN [id number]   Location [facility] 3W/SW Attending Ariel Maldonado MD   Hosp Day # 2 PCP Rex Artis MD       Subjective:    Carli Won i 101  100   CO2  25  28  26   ALKPHO  69   --    --    AST  15   --    --    ALT  10*   --    --    BILT  0.6   --    --    TP  8.4   --    --        Recent Labs   Lab  07/26/17   1040   LIP  21*       Recent Labs   Lab  07/26/17   1040  07/26/17   1541  07 Colonic diverticulosis without inflammation. Us Abdominal Aorta Complete  (cpt=76770)    Result Date: 7/26/2017  CONCLUSION:  1. Limited study. 2. No gross abdominal aortic aneurysm.          Xr Chest Ap Portable  (cpt=71010)    Result Date: 7/26/2017

## 2017-07-28 NOTE — PROGRESS NOTES
[facility]  Inpatient Pain Management Progress Note      Patient name: Robert Coleman 80year old female  : 1935  MRN: [id number]    Diagnosis: right side back pain    Reason for Consult: back pain     Current hospital day: EZIO PRINCE

## 2017-07-28 NOTE — PLAN OF CARE
Diabetes/Glucose Control    • Glucose maintained within prescribed range Progressing        PAIN - ADULT    • Verbalizes/displays adequate comfort level or patient's stated pain goal Progressing        Patient/Family Goals    • Patient/Family [facility]

## 2017-07-28 NOTE — CM/SW NOTE
Explanation of of BPCI/Medicare program provided. Patient was enrolled under . Patient/family agreed to phone f/u for 3 months from [address] after discharge from [address] [place] [address]. BPCI/Medicare letter and brochure provided.     [facility]

## 2017-07-28 NOTE — PLAN OF CARE
Diabetes/Glucose Control    • Glucose maintained within prescribed range Progressing        PAIN - ADULT    • Verbalizes/displays adequate comfort level or patient's stated pain goal Progressing        Patient/Family Goals    • Patient/Family [facility]

## 2017-07-28 NOTE — PROGRESS NOTES
[facility]    Progress Note    James Vieyra Patient Status:  Inpatient    1935 MRN [id number]   Location [facility] 3W/SW Attending Abdiel Varela MD   Hosp Day # 2 PCP Jose Caraballo MD       Assessment and Plan:     Right rate and rhythm, nl S1,S2 ,no murmur  Abd: Abdomen soft, nontender, nondistended, no organomegaly, bowel sounds present  Ext:  no clubbing, no cyanosis,no edema  Neuro: no focal deficits  Skin: no rashes or lesions    Scheduled Meds:   • atorvastatin  20 m nonspecific. 5. Lesser incidental findings as above. Xr Cervical Spine (2 Views) (qxw=[id number])    Result Date: 7/28/2017  CONCLUSION:  1. Technically limited cervical spine series. No acute osseous abnormality C1-C5. Alignment C5-7 intact.  2. [place]

## 2017-07-28 NOTE — CONSULTS
[facility] - [facility]    Report of Consultation    Maurice Fung Patient Status:  Inpatient    1935 MRN [id number]   Location [facility] 3W/SW Attending Solomon Schwartz MD   Hosp Day # 2 PCP Noel Uribe MD     Date of Admission:   AC   SITagliptin Phosphate (JANUVIA) tab 25 mg 25 mg Oral Daily   torsemide (DEMADEX) tab 20 mg 20 mg Oral Daily   Normal Saline Flush 0.9 % injection 3 mL 3 mL Intravenous PRN   Heparin Sodium (Porcine) 5000 UNIT/ML injection 5,000 Units 5,000 Units Subcu SitaGLIPtin Phosphate (JANUVIA) 25 MG Oral Tab Take 25 mg by mouth daily. Brimonidine Tartrate-Timolol (COMBIGAN) 0.2-0.5 % Ophthalmic Solution Apply  to eye.   latanoprost (XALATAN) 0.005 % Ophthalmic Solution Apply  to eye.    aspirin (ECOTRIN LOW STR 9.1  8.3*   NA  141  140  135*   K  4.1  4.0  4.4   CL  107  101  100   CO2  25  28  26        Imaging:  Mri Spine Lumbar (cpt=72148)    Result Date: 7/27/2017  CONCLUSION:  1. Multilevel degenerative changes of the lumbar spine as detailed.  Findings are w

## 2017-07-29 LAB
ALBUMIN SERPL BCP-MCNC: 2.8 G/DL (ref 3.5–4.8)
ANION GAP SERPL CALC-SCNC: 10 MMOL/L (ref 0–18)
BUN SERPL-MCNC: 49 MG/DL (ref 8–20)
BUN/CREAT SERPL: 21 (ref 10–20)
CALCIUM SERPL-MCNC: 8.7 MG/DL (ref 8.5–10.5)
CHLORIDE SERPL-SCNC: 97 MMOL/L (ref 95–110)
CO2 SERPL-SCNC: 25 MMOL/L (ref 22–32)
CREAT SERPL-MCNC: 2.33 MG/DL (ref 0.5–1.5)
GLUCOSE BLDC GLUCOMTR-MCNC: 162 MG/DL (ref 70–99)
GLUCOSE BLDC GLUCOMTR-MCNC: 174 MG/DL (ref 70–99)
GLUCOSE BLDC GLUCOMTR-MCNC: 195 MG/DL (ref 70–99)
GLUCOSE BLDC GLUCOMTR-MCNC: 204 MG/DL (ref 70–99)
GLUCOSE SERPL-MCNC: 156 MG/DL (ref 70–99)
OSMOLALITY UR CALC.SUM OF ELEC: 290 MOSM/KG (ref 275–295)
PHOSPHATE SERPL-MCNC: 4.4 MG/DL (ref 2.4–4.7)
POTASSIUM SERPL-SCNC: 4.7 MMOL/L (ref 3.3–5.1)
SODIUM SERPL-SCNC: 132 MMOL/L (ref 136–144)

## 2017-07-29 PROCEDURE — 80069 RENAL FUNCTION PANEL: CPT | Performed by: INTERNAL MEDICINE

## 2017-07-29 PROCEDURE — 87086 URINE CULTURE/COLONY COUNT: CPT | Performed by: INTERNAL MEDICINE

## 2017-07-29 PROCEDURE — 82962 GLUCOSE BLOOD TEST: CPT

## 2017-07-29 NOTE — CHRONIC PAIN
[facility]  Anesthesiology Pain Management Progress Note      Patient name: Caitie Mart 80year old female  : 1935  MRN: [id number]    Diagnosis:  Right sided abdominal pain  (primary encounter diagnosis)  Cardiovascular disease (NOVOLOG) 100 UNIT/ML flexpen 1-5 Units, 1-5 Units, Subcutaneous, TID CC  •  HYDROcodone-acetaminophen (NORCO) 5-325 MG per tab 1 tablet, 1 tablet, Oral, Q6H PRN  •  HYDROmorphone HCl PF (DILAUDID) 1 MG/ML injection 0.4 mg, 0.4 mg, Intravenous, Q3H PRN  • 07/27/2017   B12 349 04/20/2017       Xr Routine Thoracic Spine (3 Views) (cpt=72072)    Result Date: 7/28/2017  CONCLUSION:  1. Moderate osteoarthritis. 2. Demineralization. Mri Spine Lumbar (cpt=72148)    Result Date: 7/27/2017  CONCLUSION:  1.  Manju Tahkkar

## 2017-07-29 NOTE — PROGRESS NOTES
[facility]    Cardiology - [facility]  Progress Note    Halie Soto Patient Status:  Inpatient    1935 MRN [id number]   Location [facility] 3W/SW Attending Teena Deutsch MD   Hosp Day # 3 PCP Nguyễn Dillon MD       Assessme nontender  Ext - no edema, warm, well-perfused  Neuro - A+Ox3, no facial droop or gross deficits  Psych - cooperative, calm    Results:     Lab Results  Component Value Date   WBC 11.5 (H) 07/28/2017   HGB 9.2 (L) 07/28/2017   HCT 28.7 (L) 07/28/2017   PLT

## 2017-07-29 NOTE — PHYSICAL THERAPY NOTE
Attempted to see patient for initial evaluation. She indicated that she had too much spasm and pain to participate this afternoon.   Explained to patient the benefits of early mobility, but she wanted to wait until tomorrow, stating that she had \"10/10\"

## 2017-07-29 NOTE — PROGRESS NOTES
[facility]    Progress Note    Mayramoni Sangeetha Patient Status:  Inpatient    1935 MRN [id number]   Location [facility] 3W/SW Attending Shagufta Maldonado MD   Hosp Day # 3 PCP Leta Ramirez MD       Subjective:    Filiberto Vivas i 20*   CA  9.1  8.3*  8.7   NA  140  135*  132*   K  4.0  4.4  4.7   CL  101  100  97   CO2  28  26  25          Xr Routine Thoracic Spine (3 Views) (cpt=72072)    Result Date: 7/28/2017  CONCLUSION:  1. Moderate osteoarthritis. 2. Demineralization.

## 2017-07-29 NOTE — PROGRESS NOTES
[facility]    Progress Note    José Luismonica Sangeetha Patient Status:  Inpatient    1935 MRN [id number]   Location [facility] 3W/SW Attending Shagufta Maldonado MD   Hosp Day # 3 PCP Leta Ramirez MD       Subjective:    Filiberto Vivas i 49*   CREATSERUM  1.45  1.95*  3.48*  2.33*   GFRAA  42*  30*  15*  24*   GFRNAA  35*  25*  13*  20*   CA  9.4  9.1  8.3*  8.7   ALB  3.8   --    --   2.8*   NA  141  140  135*  132*   K  4.1  4.0  4.4  4.7   CL  107  101  100  97   CO2  25  28  26  25   A above.         Xr Cervical Spine (2 Views) (fkf=[id number])    Result Date: 7/28/2017  CONCLUSION:  1. Technically limited cervical spine series. No acute osseous abnormality C1-C5. Alignment C5-7 intact. 2. Torticollis, patient's head to the left.  Loss of the

## 2017-07-29 NOTE — CHRONIC PAIN
Follow-up Note    HISTORY OF PRESENT ILLNESS:  Estrada Villa is a 80year old old female, rRight sided abdominal pain  (primary encounter diagnosis)  Cardiovascular disease  Acute right-sided thoracic back pain    And after a Phone call with Dr Stephanie Quinonez 07/29/17  0835   BP: 139/62   Pulse: 76   Resp: 20   Temp: 98 °F (36.7 °C)      General: Alert and oriented x3  Affect:  NAD  Eyes: anicteric; no injection    Spine: Normal  Minimal tenderness right side  SLR positive Right sitting 70 degrees      IMAGING: neural foraminal narrowing. No significant spinal canal stenosis. L4-L5:             There is a small to moderate diffuse disc bulge with moderate bilateral facet arthropathy.  Findings result in moderate left greater than right neural foraminal and latera

## 2017-07-29 NOTE — PLAN OF CARE
Diabetes/Glucose Control    • Glucose maintained within prescribed range Progressing        PAIN - ADULT    • Verbalizes/displays adequate comfort level or patient's stated pain goal Progressing        Patient/Family Goals    • Patient/Family [facility]

## 2017-07-29 NOTE — PLAN OF CARE
Diabetes/Glucose Control    • Glucose maintained within prescribed range Progressing        PAIN - ADULT    • Verbalizes/displays adequate comfort level or patient's stated pain goal Progressing        Patient/Family Goals    • Patient/Family [facility]

## 2017-07-30 LAB
ALBUMIN SERPL BCP-MCNC: 2.7 G/DL (ref 3.5–4.8)
ANION GAP SERPL CALC-SCNC: 10 MMOL/L (ref 0–18)
BUN SERPL-MCNC: 63 MG/DL (ref 8–20)
BUN/CREAT SERPL: 28.4 (ref 10–20)
CALCIUM SERPL-MCNC: 8.8 MG/DL (ref 8.5–10.5)
CHLORIDE SERPL-SCNC: 98 MMOL/L (ref 95–110)
CO2 SERPL-SCNC: 25 MMOL/L (ref 22–32)
CREAT SERPL-MCNC: 2.22 MG/DL (ref 0.5–1.5)
GLUCOSE BLDC GLUCOMTR-MCNC: 152 MG/DL (ref 70–99)
GLUCOSE BLDC GLUCOMTR-MCNC: 176 MG/DL (ref 70–99)
GLUCOSE BLDC GLUCOMTR-MCNC: 195 MG/DL (ref 70–99)
GLUCOSE BLDC GLUCOMTR-MCNC: 200 MG/DL (ref 70–99)
GLUCOSE SERPL-MCNC: 159 MG/DL (ref 70–99)
OSMOLALITY UR CALC.SUM OF ELEC: 297 MOSM/KG (ref 275–295)
PHOSPHATE SERPL-MCNC: 4.3 MG/DL (ref 2.4–4.7)
POTASSIUM SERPL-SCNC: 4.8 MMOL/L (ref 3.3–5.1)
SODIUM SERPL-SCNC: 133 MMOL/L (ref 136–144)

## 2017-07-30 PROCEDURE — 80069 RENAL FUNCTION PANEL: CPT | Performed by: INTERNAL MEDICINE

## 2017-07-30 PROCEDURE — 82962 GLUCOSE BLOOD TEST: CPT

## 2017-07-30 PROCEDURE — 97162 PT EVAL MOD COMPLEX 30 MIN: CPT

## 2017-07-30 NOTE — DISCHARGE PLANNING
SW attempted to meet w/ pt - per family, pt was in the bathroom at this time. SW to follow up when appropriate. 3:00PM: SW met w/ pt and pt's family to discuss discharge planning. Pt lives at home w/ family.  Per family, someone is always w/ pt so there

## 2017-07-30 NOTE — PHYSICAL THERAPY NOTE
PHYSICAL THERAPY EVALUATION - INPATIENT     Room Number: 307/307-A  Evaluation Date: 7/30/2017  Type of Evaluation: Initial  Physician Order: PT Eval and Treat    Presenting Problem: R sided abdominal pain  Reason for Therapy: Mobility Dysfunction and training;Balance training  Rehab Potential : Fair  Frequency (Obs):  (5-7x/wk)       PHYSICAL THERAPY MEDICAL/SOCIAL HISTORY     History related to current admission: 7/27 MRI: DJD and foraminal stenosis at lumbar spine, worst at L5-S1. 7/28 C-spine x-ray: MOTION AND STRENGTH ASSESSMENT  Upper extremity ROM and strength are within functional limits     Lower extremity ROM is within functional limits     Lower extremity strength is grossly 3+/5    BALANCE  Static Sitting: Good  Dynamic Sitting: Fair +  Static demonstrate transfers EOB to/from Chair/Wheelchair at assistance level: supervision with walker - rolling     Goal #2  Current Status    Goal #3 Patient is able to ambulate 150 feet with assist device: walker - rolling at assistance level: supervision   Go

## 2017-07-30 NOTE — PROGRESS NOTES
[facility]    Progress Note    John Sanchez Patient Status:  Inpatient    1935 MRN [id number]   Location [facility] 3W/SW Attending Alyssa Muro MD   Hosp Day # 4 PCP Nathen Tony MD       Subjective:    John Sanchez i 132*  133*   K  4.4  4.7  4.8   CL  100  97  98   CO2  26  25  25          Xr Routine Thoracic Spine (3 Views) (cpt=72072)    Result Date: 7/28/2017  CONCLUSION:  1. Moderate osteoarthritis. 2. Demineralization.          Xr Cervical Spine (2 Views) (cpt=720

## 2017-07-30 NOTE — PLAN OF CARE
Diabetes/Glucose Control    • Glucose maintained within prescribed range Progressing        PAIN - ADULT    • Verbalizes/displays adequate comfort level or patient's stated pain goal Progressing        Patient/Family Goals    • Patient/Family [facility]

## 2017-07-30 NOTE — PROGRESS NOTES
[facility] HOSP - [facility]    Progress Note    Baldemar Stanley Patient Status:  Inpatient    1935 MRN [id number]   Location [facility] 3W/SW Attending Delmy Sanchez MD   Hosp Day # 4 PCP Abbey Sanon MD       Subjective:    Baldemar Stanley i 8. 3*  8.7  8.8   ALB   --   2.8*  2.7*   NA  135*  132*  133*   K  4.4  4.7  4.8   CL  100  97  98   CO2  26  25  25       No results for input(s): DANNA CASTILLO in the last 72 hours.     Recent Labs   Lab  07/27/17   0905   TROP  0.04*       No results for inpu

## 2017-07-31 LAB
ALBUMIN SERPL BCP-MCNC: 2.6 G/DL (ref 3.5–4.8)
ANION GAP SERPL CALC-SCNC: 8 MMOL/L (ref 0–18)
BUN SERPL-MCNC: 64 MG/DL (ref 8–20)
BUN/CREAT SERPL: 34.4 (ref 10–20)
CALCIUM SERPL-MCNC: 9 MG/DL (ref 8.5–10.5)
CHLORIDE SERPL-SCNC: 100 MMOL/L (ref 95–110)
CO2 SERPL-SCNC: 25 MMOL/L (ref 22–32)
CREAT SERPL-MCNC: 1.86 MG/DL (ref 0.5–1.5)
GLUCOSE BLDC GLUCOMTR-MCNC: 111 MG/DL (ref 70–99)
GLUCOSE BLDC GLUCOMTR-MCNC: 156 MG/DL (ref 70–99)
GLUCOSE BLDC GLUCOMTR-MCNC: 190 MG/DL (ref 70–99)
GLUCOSE BLDC GLUCOMTR-MCNC: 194 MG/DL (ref 70–99)
GLUCOSE SERPL-MCNC: 145 MG/DL (ref 70–99)
OSMOLALITY UR CALC.SUM OF ELEC: 297 MOSM/KG (ref 275–295)
PHOSPHATE SERPL-MCNC: 3.6 MG/DL (ref 2.4–4.7)
POTASSIUM SERPL-SCNC: 4.8 MMOL/L (ref 3.3–5.1)
SODIUM SERPL-SCNC: 133 MMOL/L (ref 136–144)

## 2017-07-31 PROCEDURE — 82962 GLUCOSE BLOOD TEST: CPT

## 2017-07-31 PROCEDURE — 97110 THERAPEUTIC EXERCISES: CPT

## 2017-07-31 PROCEDURE — 80069 RENAL FUNCTION PANEL: CPT | Performed by: INTERNAL MEDICINE

## 2017-07-31 PROCEDURE — 97530 THERAPEUTIC ACTIVITIES: CPT

## 2017-07-31 RX ORDER — GLIPIZIDE 10 MG/1
10 TABLET ORAL
Status: DISCONTINUED | OUTPATIENT
Start: 2017-07-31 | End: 2017-08-04

## 2017-07-31 NOTE — PLAN OF CARE
Diabetes/Glucose Control    • Glucose maintained within prescribed range Progressing        PAIN - ADULT    • Verbalizes/displays adequate comfort level or patient's stated pain goal Progressing        Patient/Family Goals    • Patient/Family [facility]

## 2017-07-31 NOTE — PROGRESS NOTES
[facility]  Inpatient Pain Management Progress Note      Patient name: John Sanchez 80year old female  : 1935  MRN: [id number]    Diagnosis: right side back pain     Reason for Consult: right side back pain     Current hospital da

## 2017-07-31 NOTE — PROGRESS NOTES
[facility]    Progress Note    José Luismonica Sangeetha Patient Status:  Inpatient    1935 MRN [id number]   Location [facility] 3W/SW Attending Shagufta Maldonado MD   Hosp Day # 5 PCP Leta Ramirez MD       Subjective:    Filiberto Vivas i CO2  25  25  25       No results for input(s): LIP, DANNA in the last 72 hours. No results for input(s): TROP, CK in the last 72 hours. No results for input(s): PT, INR in the last 72 hours.     Recent Labs   Lab  07/30/17   0759  07/30/17   1332  07/

## 2017-07-31 NOTE — SPIRITUAL CARE NOTE
Chp followed up with pt, providing pastoral care through empathetic listening and prayer. Pt expressed concern that she and the doctors make the right decisions concerning her care. She share that she has good family and cr community support.  Pt welcome

## 2017-07-31 NOTE — PLAN OF CARE
Diabetes/Glucose Control    • Glucose maintained within prescribed range Progressing        PAIN - ADULT    • Verbalizes/displays adequate comfort level or patient's stated pain goal Progressing        Patient/Family Goals    • Patient/Family [facility]

## 2017-07-31 NOTE — PLAN OF CARE
Diabetes/Glucose Control    • Glucose maintained within prescribed range Progressing        PAIN - ADULT    • Verbalizes/displays adequate comfort level or patient's stated pain goal Progressing        Patient/Family Goals    • Patient/Family [facility]

## 2017-08-01 LAB
GLUCOSE BLDC GLUCOMTR-MCNC: 119 MG/DL (ref 70–99)
GLUCOSE BLDC GLUCOMTR-MCNC: 142 MG/DL (ref 70–99)
GLUCOSE BLDC GLUCOMTR-MCNC: 182 MG/DL (ref 70–99)
GLUCOSE BLDC GLUCOMTR-MCNC: 221 MG/DL (ref 70–99)

## 2017-08-01 PROCEDURE — 82962 GLUCOSE BLOOD TEST: CPT

## 2017-08-01 PROCEDURE — 97530 THERAPEUTIC ACTIVITIES: CPT

## 2017-08-01 PROCEDURE — 97116 GAIT TRAINING THERAPY: CPT

## 2017-08-01 NOTE — PLAN OF CARE
Diabetes/Glucose Control    • Glucose maintained within prescribed range Progressing        PAIN - ADULT    • Verbalizes/displays adequate comfort level or patient's stated pain goal Progressing        Patient/Family Goals    • Patient/Family [facility]

## 2017-08-01 NOTE — PLAN OF CARE
Diabetes/Glucose Control    • Glucose maintained within prescribed range Progressing        PAIN - ADULT    • Verbalizes/displays adequate comfort level or patient's stated pain goal Progressing        Patient/Family Goals    • Patient/Family [facility]

## 2017-08-01 NOTE — PHYSICAL THERAPY NOTE
PHYSICAL THERAPY TREATMENT NOTE - INPATIENT    Room Number: 686/108-P       Presenting Problem: R sided abdominal pain    Problem List  Principal Problem:    Right sided abdominal pain      ASSESSMENT   RN notified prior to treatment and the pt was swati INPATIENT SHORT FORM - BASIC MOBILITY  How much difficulty does the patient currently have. ..  -   Turning over in bed (including adjusting bedclothes, sheets and blankets)?: A Lot   -   Sitting down on and standing up from a chair with arms (e.g., wheelch activity/exercise instructions provided to patient in preparation for discharge, with supervision. Goal #5   Current Status Ankle pumps.  Seated there ex LAQ and attempted hip marchers   Goal #6    Goal #6  Current Status

## 2017-08-01 NOTE — PROGRESS NOTES
[facility]  Inpatient Pain Management Progress Note      Patient name: Felix House 80year old female  : 1935  MRN: [id number]    Diagnosis: intractable back pain     Reason for Consult: intractable low back pain     Current hospi

## 2017-08-02 LAB
GLUCOSE BLDC GLUCOMTR-MCNC: 140 MG/DL (ref 70–99)
GLUCOSE BLDC GLUCOMTR-MCNC: 183 MG/DL (ref 70–99)
GLUCOSE BLDC GLUCOMTR-MCNC: 214 MG/DL (ref 70–99)
GLUCOSE BLDC GLUCOMTR-MCNC: 95 MG/DL (ref 70–99)

## 2017-08-02 PROCEDURE — 82962 GLUCOSE BLOOD TEST: CPT

## 2017-08-02 NOTE — PROGRESS NOTES
[facility] HOSP - [facility]    Progress Note    Kirk Curran Patient Status:  Inpatient    1935 MRN [id number]   Location [facility] 3W/SW Attending Madison Ferguson MD   Hosp Day # 7 PCP Matias Gonzalez MD       Subjective:    Kirk Curran i results for input(s): PT, INR in the last 72 hours.     Recent Labs   Lab  08/01/17   0909  08/01/17   1411  08/01/17   1756  08/01/17   2207   PGLU  119*  182*  221*  142*       Scheduled Meds:   • glipiZIDE  10 mg Oral BID AC   • atorvastatin  20 mg Oral

## 2017-08-02 NOTE — SPIRITUAL CARE NOTE
Chp stopped by for a follow-up visit and to wish pt a happy 82th birthday. Provided continued pastoral support through words of encouragement and prayer. Pt continues to appreciate regular pastoral visits.

## 2017-08-02 NOTE — PROGRESS NOTES
[facility]  Inpatient Pain Management Progress Note      Patient name: Leroy Ramos [de-identified]year old female  : 1935  MRN: [id number]    Diagnosis: back pain     Reason for Consult: back pain     Current hospital day: Hospital Day: 8

## 2017-08-02 NOTE — PLAN OF CARE
Diabetes/Glucose Control    • Glucose maintained within prescribed range Progressing        PAIN - ADULT    • Verbalizes/displays adequate comfort level or patient's stated pain goal Progressing        Patient/Family Goals    • Patient/Family [facility]

## 2017-08-02 NOTE — PLAN OF CARE
Problem: Patient/Family Goals  Goal: Patient/Family Long Term Goal  Patient's Long Term Goal: to go home ASAP    Interventions:  - See additional Care Plan goals for specific interventions    Outcome: Progressing  Medications as scheduled.  Vitals per unit maintained within prescribed range  INTERVENTIONS:  - Monitor Blood Glucose as ordered  - Assess for signs and symptoms of hyperglycemia and hypoglycemia  - Administer ordered medications to maintain glucose within target range  - Assess barriers to [place]

## 2017-08-03 ENCOUNTER — SURGERY (OUTPATIENT)
Age: 82
End: 2017-08-03

## 2017-08-03 ENCOUNTER — APPOINTMENT (OUTPATIENT)
Dept: GENERAL RADIOLOGY | Facility: HOSPITAL | Age: 82
DRG: 551 | End: 2017-08-03
Attending: ANESTHESIOLOGY
Payer: MEDICARE

## 2017-08-03 LAB
ANION GAP SERPL CALC-SCNC: 3 MMOL/L (ref 0–18)
BUN SERPL-MCNC: 58 MG/DL (ref 8–20)
BUN/CREAT SERPL: 38.7 (ref 10–20)
CALCIUM SERPL-MCNC: 8.9 MG/DL (ref 8.5–10.5)
CHLORIDE SERPL-SCNC: 109 MMOL/L (ref 95–110)
CO2 SERPL-SCNC: 25 MMOL/L (ref 22–32)
CREAT SERPL-MCNC: 1.5 MG/DL (ref 0.5–1.5)
GLUCOSE BLDC GLUCOMTR-MCNC: 112 MG/DL (ref 70–99)
GLUCOSE BLDC GLUCOMTR-MCNC: 114 MG/DL (ref 70–99)
GLUCOSE BLDC GLUCOMTR-MCNC: 150 MG/DL (ref 70–99)
GLUCOSE BLDC GLUCOMTR-MCNC: 193 MG/DL (ref 70–99)
GLUCOSE SERPL-MCNC: 119 MG/DL (ref 70–99)
MAGNESIUM SERPL-MCNC: 2.4 MG/DL (ref 1.8–2.5)
OSMOLALITY UR CALC.SUM OF ELEC: 301 MOSM/KG (ref 275–295)
POTASSIUM SERPL-SCNC: 5.1 MMOL/L (ref 3.3–5.1)
SODIUM SERPL-SCNC: 137 MMOL/L (ref 136–144)

## 2017-08-03 PROCEDURE — 80048 BASIC METABOLIC PNL TOTAL CA: CPT | Performed by: INTERNAL MEDICINE

## 2017-08-03 PROCEDURE — 82962 GLUCOSE BLOOD TEST: CPT

## 2017-08-03 PROCEDURE — 3E0S33Z INTRODUCTION OF ANTI-INFLAMMATORY INTO EPIDURAL SPACE, PERCUTANEOUS APPROACH: ICD-10-PCS | Performed by: ANESTHESIOLOGY

## 2017-08-03 PROCEDURE — 77003 FLUOROGUIDE FOR SPINE INJECT: CPT | Performed by: ANESTHESIOLOGY

## 2017-08-03 PROCEDURE — 83735 ASSAY OF MAGNESIUM: CPT | Performed by: INTERNAL MEDICINE

## 2017-08-03 RX ORDER — METHYLPREDNISOLONE ACETATE 80 MG/ML
INJECTION, SUSPENSION INTRA-ARTICULAR; INTRALESIONAL; INTRAMUSCULAR; SOFT TISSUE AS NEEDED
Status: DISCONTINUED | OUTPATIENT
Start: 2017-08-03 | End: 2017-08-03 | Stop reason: HOSPADM

## 2017-08-03 RX ORDER — LIDOCAINE HYDROCHLORIDE 10 MG/ML
INJECTION, SOLUTION EPIDURAL; INFILTRATION; INTRACAUDAL; PERINEURAL AS NEEDED
Status: DISCONTINUED | OUTPATIENT
Start: 2017-08-03 | End: 2017-08-03 | Stop reason: HOSPADM

## 2017-08-03 NOTE — PROGRESS NOTES
Core Measure Update: EF 30%  Currently on long acting beta blocker. Currently no ace/arb or spironolactone secondary to renal function. Consider in the future if clinically appropriate.

## 2017-08-03 NOTE — PLAN OF CARE
Problem: Patient/Family Goals  Goal: Patient/Family Long Term Goal  Patient's Long Term Goal: to go home ASAP    Interventions:  - See additional Care Plan goals for specific interventions    Outcome: Progressing  Plan to discharge pt to home tomorrow.    G Control  Goal: Glucose maintained within prescribed range  INTERVENTIONS:  - Monitor Blood Glucose as ordered  - Assess for signs and symptoms of hyperglycemia and hypoglycemia  - Administer ordered medications to maintain glucose within target range  - As

## 2017-08-03 NOTE — PLAN OF CARE
Problem: Patient/Family Goals  Goal: Patient/Family Long Term Goal  Patient's Long Term Goal: to go home ASAP    Interventions:  - See additional Care Plan goals for specific interventions    Outcome: Progressing    Goal: Patient/Family Short Term Goal  Pa within target range  - Assess barriers to adequate nutritional intake and initiate nutrition consult as needed  - Instruct patient on self management of diabetes   Outcome: Progressing      Comments: Pt received with visitors at bedside,due meds given with

## 2017-08-03 NOTE — PROCEDURES
[facility] HOSP - [facility]  Procedure Report    Felix House Patient Status:  Inpatient    1935 MRN [id number]   Location [place] [address] Attending Junior Abdullahi MD   Hosp Day # 8 PCP Sherie Rodrigues MD     Date of Admission:

## 2017-08-03 NOTE — PROGRESS NOTES
[facility]    Progress Note    Halie Soto Patient Status:  Inpatient    1935 MRN [id number]   Location [facility] 3W/SW Attending Teena Deutsch MD   Hosp Day # 8 PCP Nguyễn Dillon MD       Subjective:    Halie Soto i PT, INR in the last 72 hours.     Recent Labs   Lab  08/02/17   0745  08/02/17   1418  08/02/17   1817  08/02/17   2113   PGLU  95  140*  183*  214*       Scheduled Meds:   • glipiZIDE  10 mg Oral BID AC   • atorvastatin  20 mg Oral Nightly   • Brimonidine

## 2017-08-04 VITALS
DIASTOLIC BLOOD PRESSURE: 53 MMHG | HEART RATE: 74 BPM | SYSTOLIC BLOOD PRESSURE: 142 MMHG | HEIGHT: 62 IN | TEMPERATURE: 98 F | WEIGHT: 158.13 LBS | OXYGEN SATURATION: 96 % | BODY MASS INDEX: 29.1 KG/M2 | RESPIRATION RATE: 18 BRPM

## 2017-08-04 LAB
GLUCOSE BLDC GLUCOMTR-MCNC: 153 MG/DL (ref 70–99)
GLUCOSE BLDC GLUCOMTR-MCNC: 227 MG/DL (ref 70–99)

## 2017-08-04 PROCEDURE — 97116 GAIT TRAINING THERAPY: CPT

## 2017-08-04 PROCEDURE — 97530 THERAPEUTIC ACTIVITIES: CPT

## 2017-08-04 PROCEDURE — 82962 GLUCOSE BLOOD TEST: CPT

## 2017-08-04 RX ORDER — CLOPIDOGREL BISULFATE 75 MG/1
75 TABLET ORAL DAILY
Status: DISCONTINUED | OUTPATIENT
Start: 2017-08-04 | End: 2017-08-04

## 2017-08-04 RX ORDER — GLIPIZIDE 10 MG/1
10 TABLET ORAL
Qty: 60 TABLET | Refills: 2 | Status: SHIPPED | OUTPATIENT
Start: 2017-08-04 | End: 2017-08-04

## 2017-08-04 RX ORDER — GLIPIZIDE 10 MG/1
10 TABLET ORAL
Qty: 60 TABLET | Refills: 2 | Status: ON HOLD | OUTPATIENT
Start: 2017-08-04 | End: 2018-04-21

## 2017-08-04 NOTE — HOME CARE LIAISON
MET WITH PATIENT TO DISCUSS HOME HEALTH SERVICES. PATIENT IN AGREEMENT WITH SERVICES BEING PROVIDED BY [facility]. PROVIDED [facility] BROCHURE WITH CONTACT INFORMATION, ALONG WITH LIAISON'S BUSINESS CARD.

## 2017-08-04 NOTE — PLAN OF CARE
Problem: Patient/Family Goals  Goal: Patient/Family Long Term Goal  Patient's Long Term Goal: to go home ASAP    Interventions:  - See additional Care Plan goals for specific interventions    Outcome: Adequate for Discharge  Pt is being discharged home tod light within reach, chair alarm in place.      Problem: Diabetes/Glucose Control  Goal: Glucose maintained within prescribed range  INTERVENTIONS:  - Monitor Blood Glucose as ordered  - Assess for signs and symptoms of hyperglycemia and hypoglycemia  - Admi

## 2017-08-04 NOTE — PLAN OF CARE
Problem: Patient/Family Goals  Goal: Patient/Family Long Term Goal  Patient's Long Term Goal: to go home ASAP    Interventions:  - See additional Care Plan goals for specific interventions    Outcome: Progressing    Goal: Patient/Family Short Term Goal  Pa maintain glucose within target range  - Assess barriers to adequate nutritional intake and initiate nutrition consult as needed  - Instruct patient on self management of diabetes   Outcome: Progressing

## 2017-08-04 NOTE — CM/SW NOTE
CTl spoke with patient at bedside and  via phone. Patient and  agreeing with [address] [facility] and PT. Patient is [facility] chosen. Referral made to [facility]. Patient states  and son are home with her 24 hours a day.  [facility]

## 2017-08-04 NOTE — PROGRESS NOTES
[facility]    Progress Note    Suellen Arias Patient Status:  Inpatient    1935 MRN [id number]   Location [facility] 3W/SW Attending Rivera Perez MD   Hosp Day # 9 PCP Katharine Velazquez MD       Subjective:    Suellen rAias i results for input(s): PT, INR in the last 72 hours.     Recent Labs   Lab  08/03/17   0856  08/03/17   0923  08/03/17   1352  08/03/17   1841   PGLU  112*  114*  193*  150*       Scheduled Meds:   • glipiZIDE  10 mg Oral BID AC   • atorvastatin  20 mg Oral

## 2017-08-04 NOTE — PROGRESS NOTES
[facility]  Inpatient Pain Management Progress Note      Patient name: Halie Soto [de-identified]year old female  : 1935  MRN: [id number]    Diagnosis: right side back pain     Reason for Consult: right side back pain     Current hospital da Service [phone]

## 2017-08-05 ENCOUNTER — TELEPHONE (OUTPATIENT)
Dept: CARDIOLOGY UNIT | Facility: HOSPITAL | Age: 82
End: 2017-08-05

## 2017-08-05 ENCOUNTER — TELEPHONE (OUTPATIENT)
Dept: MEDSURG UNIT | Facility: HOSPITAL | Age: 82
End: 2017-08-05

## 2017-08-29 NOTE — DISCHARGE SUMMARY
[facility]    PATIENT'S NAME: Anna Ish MCMILLAN   ATTENDING PHYSICIAN: Stevie Tejeda MD   PATIENT ACCOUNT#:   [id number]    LOCATION:  [address] [place] [address] #:   [id number]       YOB: 1935  ADMISSION DATE:       07/26/20 levels and a known history of coronary artery disease. Patient continues to refuse the ICD that was recommended given her cardiomyopathy. The patient underwent an epidural steroid injection by the Pain Clinic with significant improvement in her symptoms.

## 2018-04-17 ENCOUNTER — HOSPITAL ENCOUNTER (INPATIENT)
Facility: HOSPITAL | Age: 83
LOS: 4 days | Discharge: HOME HEALTH | DRG: 554 | End: 2018-04-22
Attending: EMERGENCY MEDICINE | Admitting: INTERNAL MEDICINE
Payer: MEDICARE

## 2018-04-17 DIAGNOSIS — R26.2 INABILITY TO AMBULATE DUE TO MULTIPLE JOINTS: ICD-10-CM

## 2018-04-17 DIAGNOSIS — M10.9 ACUTE GOUTY ARTHRITIS: Primary | ICD-10-CM

## 2018-04-17 PROCEDURE — 0S9C3ZZ DRAINAGE OF RIGHT KNEE JOINT, PERCUTANEOUS APPROACH: ICD-10-PCS | Performed by: EMERGENCY MEDICINE

## 2018-04-17 PROCEDURE — 0S9D3ZZ DRAINAGE OF LEFT KNEE JOINT, PERCUTANEOUS APPROACH: ICD-10-PCS | Performed by: EMERGENCY MEDICINE

## 2018-04-17 RX ORDER — DEXAMETHASONE SODIUM PHOSPHATE 4 MG/ML
4 VIAL (ML) INJECTION ONCE
Status: COMPLETED | OUTPATIENT
Start: 2018-04-17 | End: 2018-04-17

## 2018-04-17 RX ORDER — HYDROCODONE BITARTRATE AND ACETAMINOPHEN 5; 325 MG/1; MG/1
1 TABLET ORAL EVERY 4 HOURS PRN
Status: DISCONTINUED | OUTPATIENT
Start: 2018-04-17 | End: 2018-04-22

## 2018-04-17 RX ORDER — LIDOCAINE 50 MG/G
2 PATCH TOPICAL ONCE
Status: COMPLETED | OUTPATIENT
Start: 2018-04-17 | End: 2018-04-18

## 2018-04-17 RX ORDER — ACETAMINOPHEN 325 MG/1
650 TABLET ORAL EVERY 6 HOURS PRN
Status: DISCONTINUED | OUTPATIENT
Start: 2018-04-17 | End: 2018-04-22

## 2018-04-17 RX ORDER — HYDROCODONE BITARTRATE AND ACETAMINOPHEN 5; 325 MG/1; MG/1
1 TABLET ORAL ONCE
Status: COMPLETED | OUTPATIENT
Start: 2018-04-17 | End: 2018-04-17

## 2018-04-17 RX ORDER — BUPIVACAINE HYDROCHLORIDE 2.5 MG/ML
5 INJECTION, SOLUTION EPIDURAL; INFILTRATION; INTRACAUDAL ONCE
Status: COMPLETED | OUTPATIENT
Start: 2018-04-17 | End: 2018-04-17

## 2018-04-17 NOTE — ED INITIAL ASSESSMENT (HPI)
Right knee swelling and pain. As well as left ankle pain. hx gout.  urnable to ambulate for the past 4 day, pain worse today

## 2018-04-18 ENCOUNTER — APPOINTMENT (OUTPATIENT)
Dept: GENERAL RADIOLOGY | Facility: HOSPITAL | Age: 83
DRG: 554 | End: 2018-04-18
Attending: INTERNAL MEDICINE
Payer: MEDICARE

## 2018-04-18 PROBLEM — M19.90 ACUTE ARTHRITIS: Status: ACTIVE | Noted: 2018-04-18

## 2018-04-18 PROCEDURE — 99222 1ST HOSP IP/OBS MODERATE 55: CPT | Performed by: INTERNAL MEDICINE

## 2018-04-18 PROCEDURE — 73560 X-RAY EXAM OF KNEE 1 OR 2: CPT | Performed by: INTERNAL MEDICINE

## 2018-04-18 PROCEDURE — 73610 X-RAY EXAM OF ANKLE: CPT | Performed by: INTERNAL MEDICINE

## 2018-04-18 PROCEDURE — 20610 DRAIN/INJ JOINT/BURSA W/O US: CPT | Performed by: INTERNAL MEDICINE

## 2018-04-18 RX ORDER — PRAVASTATIN SODIUM 20 MG
20 TABLET ORAL NIGHTLY
Status: DISCONTINUED | OUTPATIENT
Start: 2018-04-18 | End: 2018-04-22

## 2018-04-18 RX ORDER — GLIPIZIDE 10 MG/1
10 TABLET ORAL
Status: DISCONTINUED | OUTPATIENT
Start: 2018-04-18 | End: 2018-04-18

## 2018-04-18 RX ORDER — DEXTROSE MONOHYDRATE 25 G/50ML
50 INJECTION, SOLUTION INTRAVENOUS AS NEEDED
Status: DISCONTINUED | OUTPATIENT
Start: 2018-04-18 | End: 2018-04-22

## 2018-04-18 RX ORDER — LATANOPROST 50 UG/ML
1 SOLUTION/ DROPS OPHTHALMIC NIGHTLY
Status: DISCONTINUED | OUTPATIENT
Start: 2018-04-18 | End: 2018-04-22

## 2018-04-18 RX ORDER — CARVEDILOL 25 MG/1
25 TABLET ORAL 2 TIMES DAILY WITH MEALS
Status: DISCONTINUED | OUTPATIENT
Start: 2018-04-18 | End: 2018-04-22

## 2018-04-18 RX ORDER — ISOSORBIDE DINITRATE 20 MG/1
30 TABLET ORAL
Status: DISCONTINUED | OUTPATIENT
Start: 2018-04-18 | End: 2018-04-22

## 2018-04-18 RX ORDER — PANTOPRAZOLE SODIUM 20 MG/1
20 TABLET, DELAYED RELEASE ORAL
Status: DISCONTINUED | OUTPATIENT
Start: 2018-04-18 | End: 2018-04-22

## 2018-04-18 RX ORDER — HYDRALAZINE HYDROCHLORIDE 25 MG/1
25 TABLET, FILM COATED ORAL EVERY 8 HOURS SCHEDULED
Status: DISCONTINUED | OUTPATIENT
Start: 2018-04-18 | End: 2018-04-22

## 2018-04-18 RX ORDER — FERROUS GLUCONATE 324(37.5)
324 TABLET ORAL
Status: DISCONTINUED | OUTPATIENT
Start: 2018-04-18 | End: 2018-04-22

## 2018-04-18 RX ORDER — CLOPIDOGREL BISULFATE 75 MG/1
75 TABLET ORAL DAILY
Status: DISCONTINUED | OUTPATIENT
Start: 2018-04-18 | End: 2018-04-22

## 2018-04-18 RX ORDER — METHYLPREDNISOLONE SODIUM SUCCINATE 40 MG/ML
40 INJECTION, POWDER, LYOPHILIZED, FOR SOLUTION INTRAMUSCULAR; INTRAVENOUS EVERY 8 HOURS
Status: DISCONTINUED | OUTPATIENT
Start: 2018-04-18 | End: 2018-04-19

## 2018-04-18 RX ORDER — SODIUM CHLORIDE 0.9 % (FLUSH) 0.9 %
3 SYRINGE (ML) INJECTION AS NEEDED
Status: DISCONTINUED | OUTPATIENT
Start: 2018-04-18 | End: 2018-04-22

## 2018-04-18 RX ORDER — ALBUTEROL SULFATE 90 UG/1
2 AEROSOL, METERED RESPIRATORY (INHALATION) EVERY 4 HOURS PRN
Status: DISCONTINUED | OUTPATIENT
Start: 2018-04-18 | End: 2018-04-22

## 2018-04-18 NOTE — H&P
[facility]    PATIENT'S NAME: Jose Eduardo MCMILLAN   ATTENDING PHYSICIAN: Stevie Avila MD   PATIENT ACCOUNT#:   [id number]    LOCATION:  [address] [place] [address] #:   [id number]       YOB: 1935  ADMISSION DATE:       04/17/2018 drugs.    SOCIAL HISTORY:  The patient lives with her family and was able to do activities of daily living prior to the onset of this severe joint pain. REVIEW OF SYSTEMS:  Patient denies frequent headaches or ophthalmologic complaints.   She has not see obviously cannot ambulate at this time and, therefore, could not be considered an outpatient or be discharged in any foreseeable future. Further recommendations pending clinical course. Dictated By Yadira Singh MD  d: 04/18/2018 07:51:30  t: 04/1

## 2018-04-18 NOTE — ED NOTES
Patient resting on cart- c/o pain to bilateral knee and bilateral foot pain that has not improved. Patient vitals WNL- updated on plan of care. Will continue to monitor. MD made aware.

## 2018-04-18 NOTE — HOME CARE LIAISON
Met with patient to discuss home health services, pending orders. Patient agreeable to services from [facility]. Provided [facility] brochure with contact information, along with liaison's business card.

## 2018-04-18 NOTE — ED PROVIDER NOTES
Patient Seen in: [facility] AND [facility] Emergency Department    History   Patient presents with:  Pain (neurologic)    Stated Complaint: gout pain    HPI    82-year-old female presents for complaint of a flareup of her gout.   Patient reports bilateral knee pa Device: None (Room air)    Current:/82   Pulse 89   Temp 98.5 °F (36.9 °C)   Resp 20   Ht 154.9 cm (5' 1\")   Wt 70.8 kg   SpO2 96%   BMI 29.48 kg/m²         Physical Exam   Constitutional: She is oriented to person, place, and time.  She appears well She is alert and oriented to person, place, and time. No cranial nerve deficit. Coordination normal.   Skin: Skin is warm and dry. Psychiatric: She has a normal mood and affect. Her behavior is normal.   Nursing note and vitals reviewed.             ED Co management. Imaging:   No results found.       SpO2: Normal 96%               Admission disposition: 4/17/2018 10:01 PM           Disposition and Plan     Clinical Impression:  Acute gouty arthritis  (primary encounter diagnosis)  Inability to ambulate d

## 2018-04-18 NOTE — CONSULTS
[facility] HOSP - [facility]    Report of Consultation    Maged Emanuel Patient Status:  Observation    1935 MRN [id number]   Location [facility]5W Attending Emlira Brown MD   Hosp Day # 0 PCP Brianna Burgess MD     Date of Admission:   uncontrolled        Past Surgical History  History reviewed. No pertinent surgical history.     Family History  Family History   Problem Relation Age of Onset   • Hypertension Other      per ng family hx   • Diabetes Other      per ng family hx   • Cancer O Prior to Admission:  glipiZIDE 10 MG Oral Tab Take 1 tablet (10 mg total) by mouth 2 (two) times daily before meals.    omeprazole (PRILOSEC) 20 MG Oral Capsule Delayed Release TAKE 1 CAPSULE EVERY MORNING   carvedilol (COREG) 25 MG Oral Tab TAKE 1 TABLET T tingling, no headache, no hx of seizures,   Psych: no hx of anxiety or depression  ENDO: no hx of thyroid disease, no hx of DM  Joint/Muscluskeltal: see HPI,   All other ROS are negative.        Physical Exam:   Blood pressure 148/79, pulse 94, temperature lidocaine 1 % and . It was done under sterile technique using iodine and alcohol swabs and ethyl chloride was used as an anaesthetic spray. Pt.  tolerated it well. Thank you for allowing me to participate in the care of your patient.     Tara Webber

## 2018-04-18 NOTE — CM/SW NOTE
SW self-referred to meet w/ pt due to case finding and diagnosis. SW met w/ pt to discuss eventual discharge needs. Pt lives at home w/ her supportive  in [facility].  Pt lives in a 2 flat house, but live on the main level w/ 9 external steps to enter

## 2018-04-18 NOTE — PROGRESS NOTES
Isosorb Dinitrate-Hydralazine (BIDIL) 20-37.5 MG per tab 1.5 tablet tid  is Non-Formulary Medication &  Auto-Substituted to Isosorbide dinitrate 30mg + Hydralazine 50mg tid  Per P&T PROTOCOL

## 2018-04-18 NOTE — PLAN OF CARE
Problem: Diabetes/Glucose Control  Goal: Glucose maintained within prescribed range  INTERVENTIONS:  - Monitor Blood Glucose as ordered  - Assess for signs and symptoms of hyperglycemia and hypoglycemia  - Administer ordered medications to maintain glucose Verbalizes/displays adequate comfort level or patient's stated pain goal  INTERVENTIONS:  - Encourage pt to monitor pain and request assistance  - Assess pain using appropriate pain scale  - Administer analgesics based on type and severity of pain and eval decision-making at the level they choose  - Honor patient and family perspectives and choices   Outcome: Progressing  Pt was notified of poc.     Comments:  Pt unsure about home meds, granddaughter, Jailene Bledsoe was helping w/ home meds review, Alivia Luciano will rec

## 2018-04-19 PROCEDURE — 99232 SBSQ HOSP IP/OBS MODERATE 35: CPT | Performed by: INTERNAL MEDICINE

## 2018-04-19 RX ORDER — METHYLPREDNISOLONE SODIUM SUCCINATE 40 MG/ML
40 INJECTION, POWDER, LYOPHILIZED, FOR SOLUTION INTRAMUSCULAR; INTRAVENOUS EVERY 12 HOURS
Status: DISCONTINUED | OUTPATIENT
Start: 2018-04-19 | End: 2018-04-20

## 2018-04-19 RX ORDER — DEXTROSE MONOHYDRATE 25 G/50ML
50 INJECTION, SOLUTION INTRAVENOUS AS NEEDED
Status: DISCONTINUED | OUTPATIENT
Start: 2018-04-19 | End: 2018-04-22

## 2018-04-19 NOTE — PLAN OF CARE
Problem: Diabetes/Glucose Control  Goal: Glucose maintained within prescribed range  INTERVENTIONS:  - Monitor Blood Glucose as ordered  - Assess for signs and symptoms of hyperglycemia and hypoglycemia  - Administer ordered medications to maintain glucose for mobility and gait  - Educate and engage patient/family in tolerated activity level and precautions    Outcome: Progressing  PT ordered today.  They will see patient this afternoon    Problem: PAIN - ADULT  Goal: Verbalizes/displays adequate comfort leve information to patient/family  - Incorporate patient and family knowledge, values, beliefs, and cultural backgrounds into the planning and delivery of care  - Encourage patient/family to participate in care and decision-making at the level they choose  - H

## 2018-04-19 NOTE — DIABETES ED
[facility] HOSP - [facility]    Diabetes Education  Note    Estrada Villa Patient Status:  Inpatient   1935 MRN [id number]  Location [facility] Attending Odette Waterman MD  Hosp Day # 1 PCP Ivy Morel MD    Reason for Visit: Diabetes Sc

## 2018-04-19 NOTE — PROCEDURES
[address] [place] [address]  Patient Status:  Inpatient    1935 MRN [id number]   Location [facility]5W Attending Allison Chow MD   Hosp Day # 0 PCP MD Arnoldo Howardmonica Dodd is a [de-identified]year old female patient.   Acute gouty art

## 2018-04-19 NOTE — PROGRESS NOTES
[facility] HOSP - [facility]    Progress Note    Leroy Ramos Patient Status:  Inpatient    1935 MRN [id number]   Location [facility]5W Attending Major Pickard MD   Hosp Day # 1 PCP Denis Sauceda MD       Subjective:    Leroy Rein is a( (cpt=[id number])    Result Date: 4/18/2018  CONCLUSION:  1. Soft tissue swelling. 2. Demineralization. 3. Plantar calcaneal enthesophytes. Dictated by (CST): Mena Gayle MD on 4/18/2018 at 8:46     Approved by (CST):  Mena Gayle MD on 4/18/2018

## 2018-04-19 NOTE — CM/SW NOTE
Met with patient at bedside to explain the BPCI/Medicare program. Patient agreed with phone follow up for 3 months from [address] [place] [address] after discharge from [facility]. Patient was enrolled under  . BPCI/Medicare letter and brochure provided.

## 2018-04-19 NOTE — PROGRESS NOTES
[facility] HOSP - [facility]    Progress Note    Anuradhaia Bhavesh Patient Status:  Inpatient    1935 MRN [id number]   Location [facility]5W Attending Jannette Reynaga MD   Hosp Day # 1 PCP Nolan Echavarria MD       Subjective:    Pippa Ospina is a( mg Oral Daily with breakfast   • latanoprost  1 drop Both Eyes Nightly   • Pantoprazole Sodium  20 mg Oral QAM AC   • Pravastatin Sodium  20 mg Oral Nightly   • SITagliptin Phosphate  25 mg Oral Daily   • hydrALAzine HCl  25 mg Oral Q8H [facility]    And   • isos

## 2018-04-19 NOTE — PHYSICAL THERAPY NOTE
PHYSICAL THERAPY EVALUATION - INPATIENT     Room Number: 822/168-D  Evaluation Date: 4/19/2018  Type of Evaluation: Initial   Physician Order: PT Eval and Treat    Presenting Problem: Arthris on B knee and ankle  Reason for Therapy: Mobility Dysfunction a acute osseous injury of either knee.          Problem List  Principal Problem:    Acute gouty arthritis  Active Problems:    Inability to ambulate due to multiple joints    Acute arthritis      Past Medical History  Past Medical History:   Diagnosis Date None   How much help from another person does the patient currently need. ..   -   Moving to and from a bed to a chair (including a wheelchair)?: A Little   -   Need to walk in hospital room?: A Little   -   Climbing 3-5 steps with a railing?: A Little

## 2018-04-20 ENCOUNTER — TELEPHONE (OUTPATIENT)
Dept: RHEUMATOLOGY | Facility: CLINIC | Age: 83
End: 2018-04-20

## 2018-04-20 PROCEDURE — 99232 SBSQ HOSP IP/OBS MODERATE 35: CPT | Performed by: INTERNAL MEDICINE

## 2018-04-20 RX ORDER — PREDNISONE 20 MG/1
20 TABLET ORAL
Status: DISCONTINUED | OUTPATIENT
Start: 2018-04-21 | End: 2018-04-22

## 2018-04-20 RX ORDER — PREDNISONE 20 MG/1
20 TABLET ORAL 2 TIMES DAILY WITH MEALS
Status: DISCONTINUED | OUTPATIENT
Start: 2018-04-20 | End: 2018-04-20

## 2018-04-20 NOTE — PROGRESS NOTES
[facility]    Progress Note    Filiberto Vivas Patient Status:  Inpatient    1935 MRN [id number]   Location [facility]5W Attending Shagufta Maldonado MD   Hosp Day # 2 PCP Leta Ramirez MD       Subjective:    Filiberto Vivas is a( 251*  262*  297*  324*       Scheduled Meds:   • Insulin Aspart Pen  1-7 Units Subcutaneous TID CC   • insulin detemir  12 Units Subcutaneous Daily   • MethylPREDNISolone Sodium Succ  40 mg Intravenous Q12H   • Brimonidine Tartrate-Timolol  1 drop Both Eye

## 2018-04-20 NOTE — PHYSICAL THERAPY NOTE
PHYSICAL THERAPY TREATMENT NOTE - INPATIENT     Room Number: 199/975-P       Presenting Problem: Arthris on B knee and ankle    Problem List  Principal Problem:    Acute gouty arthritis  Active Problems:    Inability to ambulate due to multiple joints    A pacing and breathing techniques. AM-PAC '6-Clicks' INPATIENT SHORT FORM - BASIC MOBILITY  How much difficulty does the patient currently have. ..  -   Turning over in bed (including adjusting bedclothes, sheets and blankets)?: A Little   -   Sitting vincent verbal cuing for pacing and breathing. Goal #4 Patient will negotiate 5 stairs/one curb w/ assistive device and supervision   Goal #4   Current Status  NT.  Patient reports her family members lift her up the stairs holding onto her and placing her feet o

## 2018-04-20 NOTE — CM/SW NOTE
4/20CM-MD orders received in regards to discharge planning [facility]-Case Management previously noted that a referral was sent to [facility].  This Writer informed Christiano Fabian ([id number]) [place] [phone] that the [place] [address] orders are in.     [id number]

## 2018-04-20 NOTE — PLAN OF CARE
Problem: Diabetes/Glucose Control  Goal: Glucose maintained within prescribed range  INTERVENTIONS:  - Monitor Blood Glucose as ordered  - Assess for signs and symptoms of hyperglycemia and hypoglycemia  - Administer ordered medications to maintain glucose mobility/gait  Interventions:  - Assess patient's functional ability and stability  - Promote increasing activity/tolerance for mobility and gait  - Educate and engage patient/family in tolerated activity level and precautions    Outcome: Progressing  PT r care  Interventions:  - What would you like us to know as we care for you? \" I live with my \".   - Provide timely, complete, and accurate information to patient/family  - Incorporate patient and family knowledge, values, beliefs, and cultural backg

## 2018-04-20 NOTE — PLAN OF CARE
Problem: Diabetes/Glucose Control  Goal: Glucose maintained within prescribed range  INTERVENTIONS:  - Monitor Blood Glucose as ordered  - Assess for signs and symptoms of hyperglycemia and hypoglycemia  - Administer ordered medications to maintain glucose goal  INTERVENTIONS:  - Encourage pt to monitor pain and request assistance  - Assess pain using appropriate pain scale  - Administer analgesics based on type and severity of pain and evaluate response  - Implement non-pharmacological measures as appropria able to express needs

## 2018-04-21 PROCEDURE — 99232 SBSQ HOSP IP/OBS MODERATE 35: CPT | Performed by: INTERNAL MEDICINE

## 2018-04-21 RX ORDER — PIOGLITAZONEHYDROCHLORIDE 15 MG/1
15 TABLET ORAL DAILY
Status: DISCONTINUED | OUTPATIENT
Start: 2018-04-21 | End: 2018-04-22

## 2018-04-21 RX ORDER — GLIPIZIDE 10 MG/1
10 TABLET ORAL
Status: DISCONTINUED | OUTPATIENT
Start: 2018-04-21 | End: 2018-04-21

## 2018-04-21 RX ORDER — PREDNISONE 20 MG/1
20 TABLET ORAL
Qty: 20 TABLET | Refills: 0 | Status: SHIPPED | OUTPATIENT
Start: 2018-04-21 | End: 2018-06-25 | Stop reason: ALTCHOICE

## 2018-04-21 RX ORDER — PIOGLITAZONEHYDROCHLORIDE 15 MG/1
15 TABLET ORAL DAILY
Qty: 30 TABLET | Refills: 2 | Status: ON HOLD | OUTPATIENT
Start: 2018-04-21 | End: 2018-10-03

## 2018-04-21 RX ORDER — GLIPIZIDE 10 MG/1
15 TABLET ORAL
Qty: 60 TABLET | Refills: 2 | Status: SHIPPED | OUTPATIENT
Start: 2018-04-21 | End: 2018-06-25

## 2018-04-21 NOTE — PROGRESS NOTES
[facility]    Progress Note    James Jarrell Patient Status:  Inpatient    1935 MRN [id number]   Location [facility]5W Attending Abdiel Varela MD   Hosp Day # 3 PCP Jose Caraballo MD       Subjective:    James Vieyra is a( ESRML 101 (H) 04/18/2018   CRP 17.0 (H) 04/19/2018    (H) 09/30/2013   MG 2.4 08/03/2017   PHOS 3.6 07/31/2017   TROP 0.04 (HH) 07/27/2017   B12 349 04/20/2017                   Lindsey Benavides MD  4/20/2018

## 2018-04-21 NOTE — PLAN OF CARE
Problem: Diabetes/Glucose Control  Goal: Glucose maintained within prescribed range  INTERVENTIONS:  - Monitor Blood Glucose as ordered  - Assess for signs and symptoms of hyperglycemia and hypoglycemia  - Administer ordered medications to maintain glucose evaluate response  - Implement non-pharmacological measures as appropriate and evaluate response  - Consider cultural and social influences on pain and pain management  - Manage/alleviate anxiety  - Utilize distraction and/or relaxation techniques  - Monit

## 2018-04-21 NOTE — PROGRESS NOTES
[facility]    Progress Note    Scott Álvarez Patient Status:  Inpatient    1935 MRN [id number]   Location [facility]5W Attending Niyah Gonzales MD   Hosp Day # 3 PCP Nadeen Ceron MD       Subjective:    Scott Álvarez is a( hours. No results for input(s): PT, INR in the last 72 hours.     Recent Labs   Lab  04/20/18   0719  04/20/18   1248  04/20/18   1738  04/20/18   2052   PGLU  283*  362*  420*  312*       Scheduled Meds:   • predniSONE  20 mg Oral Daily with breakfast

## 2018-04-22 VITALS
HEIGHT: 60 IN | HEART RATE: 70 BPM | TEMPERATURE: 98 F | OXYGEN SATURATION: 96 % | RESPIRATION RATE: 20 BRPM | DIASTOLIC BLOOD PRESSURE: 61 MMHG | BODY MASS INDEX: 31.57 KG/M2 | WEIGHT: 160.81 LBS | SYSTOLIC BLOOD PRESSURE: 142 MMHG

## 2018-04-22 NOTE — PLAN OF CARE
Patient ambulating with walker and one standby assist.  Tolerating 1800 jun ADA, Cardiac diet. Saline lock removed. Discharge instructions reviewed with patient and her two granddaughters. All questions answered.

## 2018-04-22 NOTE — CM/SW NOTE
RN/An informed SW that anticipated discharge is today. Orders have been entered. SW left a voicemail for Honey/Claudine regarding discharge. TAMRA/CM to remain available for support and/or discharge planning.      Trini Mojica, [address]

## 2018-04-22 NOTE — PROGRESS NOTES
[facility]    Progress Note    Felix House Patient Status:  Inpatient    1935 MRN [id number]   Location [facility]5W Attending Junior Abdullahi MD   Hosp Day # 4 PCP Sherie Rodrigues MD       Subjective:    Felix House is a( 04/21/18   1401  04/21/18   1813  04/21/18   2119   PGLU  211*  141*  285*  243*       Scheduled Meds:   • glipiZIDE  15 mg Oral BID AC   • Pioglitazone HCl  15 mg Oral Daily   • predniSONE  20 mg Oral Daily with breakfast   • Insulin Aspart Pen  1-7 Units

## 2018-05-23 NOTE — DISCHARGE SUMMARY
[facility]    PATIENT'S NAME: Danay MCMILLAN   ATTENDING PHYSICIAN: Stevie Singh MD   PATIENT ACCOUNT#:   [id number]    LOCATION:  [address] [place] [address] #:   [id number]       YOB: 1935  ADMISSION DATE:       04/17/2018

## 2018-06-25 ENCOUNTER — APPOINTMENT (OUTPATIENT)
Dept: CT IMAGING | Facility: HOSPITAL | Age: 83
DRG: 069 | End: 2018-06-25
Attending: EMERGENCY MEDICINE
Payer: MEDICARE

## 2018-06-25 ENCOUNTER — APPOINTMENT (OUTPATIENT)
Dept: MRI IMAGING | Facility: HOSPITAL | Age: 83
DRG: 069 | End: 2018-06-25
Attending: Other
Payer: MEDICARE

## 2018-06-25 ENCOUNTER — HOSPITAL ENCOUNTER (INPATIENT)
Facility: HOSPITAL | Age: 83
LOS: 3 days | Discharge: HOME HEALTH | DRG: 069 | End: 2018-06-28
Attending: EMERGENCY MEDICINE | Admitting: INTERNAL MEDICINE
Payer: MEDICARE

## 2018-06-25 DIAGNOSIS — R41.0 CONFUSION: Primary | ICD-10-CM

## 2018-06-25 LAB
ANION GAP SERPL CALC-SCNC: 9 MMOL/L (ref 0–18)
BASOPHILS # BLD: 0.1 K/UL (ref 0–0.2)
BASOPHILS NFR BLD: 1 %
BILIRUB UR QL: NEGATIVE
BUN SERPL-MCNC: 16 MG/DL (ref 8–20)
BUN/CREAT SERPL: 8.8 (ref 10–20)
CALCIUM SERPL-MCNC: 8.6 MG/DL (ref 8.5–10.5)
CHLORIDE SERPL-SCNC: 107 MMOL/L (ref 95–110)
CHOLEST SERPL-MCNC: 183 MG/DL (ref 110–200)
CO2 SERPL-SCNC: 24 MMOL/L (ref 22–32)
COLOR UR: YELLOW
CREAT SERPL-MCNC: 1.81 MG/DL (ref 0.5–1.5)
EOSINOPHIL # BLD: 0.1 K/UL (ref 0–0.7)
EOSINOPHIL NFR BLD: 1 %
ERYTHROCYTE [DISTWIDTH] IN BLOOD BY AUTOMATED COUNT: 16.8 % (ref 11–15)
GLUCOSE BLDC GLUCOMTR-MCNC: 101 MG/DL (ref 70–99)
GLUCOSE BLDC GLUCOMTR-MCNC: 111 MG/DL (ref 70–99)
GLUCOSE BLDC GLUCOMTR-MCNC: 120 MG/DL (ref 70–99)
GLUCOSE BLDC GLUCOMTR-MCNC: 136 MG/DL (ref 70–99)
GLUCOSE SERPL-MCNC: 131 MG/DL (ref 70–99)
GLUCOSE UR-MCNC: NEGATIVE MG/DL
HCT VFR BLD AUTO: 37.4 % (ref 35–48)
HDLC SERPL-MCNC: 44 MG/DL
HGB BLD-MCNC: 12.2 G/DL (ref 12–16)
HGB UR QL STRIP.AUTO: NEGATIVE
HYALINE CASTS #/AREA URNS AUTO: 15 /LPF
INR BLD: 1.1 (ref 0.9–1.2)
KETONES UR-MCNC: NEGATIVE MG/DL
LDLC SERPL CALC-MCNC: 117 MG/DL (ref 0–99)
LEUKOCYTE ESTERASE UR QL STRIP.AUTO: NEGATIVE
LYMPHOCYTES # BLD: 1.8 K/UL (ref 1–4)
LYMPHOCYTES NFR BLD: 26 %
MCH RBC QN AUTO: 28.1 PG (ref 27–32)
MCHC RBC AUTO-ENTMCNC: 32.6 G/DL (ref 32–37)
MCV RBC AUTO: 86.4 FL (ref 80–100)
MONOCYTES # BLD: 0.5 K/UL (ref 0–1)
MONOCYTES NFR BLD: 7 %
NEUTROPHILS # BLD AUTO: 4.4 K/UL (ref 1.8–7.7)
NEUTROPHILS NFR BLD: 65 %
NITRITE UR QL STRIP.AUTO: NEGATIVE
NONHDLC SERPL-MCNC: 139 MG/DL
OSMOLALITY UR CALC.SUM OF ELEC: 293 MOSM/KG (ref 275–295)
PH UR: 5 [PH] (ref 5–8)
PLATELET # BLD AUTO: 174 K/UL (ref 140–400)
PMV BLD AUTO: 9.9 FL (ref 7.4–10.3)
POTASSIUM SERPL-SCNC: 4.2 MMOL/L (ref 3.3–5.1)
PROT UR-MCNC: >=500 MG/DL
PROTHROMBIN TIME: 13.9 SECONDS (ref 11.8–14.5)
RBC # BLD AUTO: 4.33 M/UL (ref 3.7–5.4)
RBC #/AREA URNS AUTO: 2 /HPF
SODIUM SERPL-SCNC: 140 MMOL/L (ref 136–144)
SP GR UR STRIP: 1.02 (ref 1–1.03)
TRIGL SERPL-MCNC: 109 MG/DL (ref 1–149)
TROPONIN I SERPL-MCNC: 0.05 NG/ML (ref ?–0.03)
TROPONIN I SERPL-MCNC: 0.05 NG/ML (ref ?–0.03)
UROBILINOGEN UR STRIP-ACNC: 2
VIT C UR-MCNC: NEGATIVE MG/DL
WBC # BLD AUTO: 6.8 K/UL (ref 4–11)
WBC #/AREA URNS AUTO: 14 /HPF

## 2018-06-25 PROCEDURE — 93005 ELECTROCARDIOGRAM TRACING: CPT

## 2018-06-25 PROCEDURE — 85025 COMPLETE CBC W/AUTO DIFF WBC: CPT | Performed by: EMERGENCY MEDICINE

## 2018-06-25 PROCEDURE — 93010 ELECTROCARDIOGRAM REPORT: CPT | Performed by: EMERGENCY MEDICINE

## 2018-06-25 PROCEDURE — 87086 URINE CULTURE/COLONY COUNT: CPT | Performed by: EMERGENCY MEDICINE

## 2018-06-25 PROCEDURE — 85610 PROTHROMBIN TIME: CPT | Performed by: EMERGENCY MEDICINE

## 2018-06-25 PROCEDURE — 83036 HEMOGLOBIN GLYCOSYLATED A1C: CPT | Performed by: INTERNAL MEDICINE

## 2018-06-25 PROCEDURE — 70450 CT HEAD/BRAIN W/O DYE: CPT | Performed by: EMERGENCY MEDICINE

## 2018-06-25 PROCEDURE — 82962 GLUCOSE BLOOD TEST: CPT

## 2018-06-25 PROCEDURE — 80048 BASIC METABOLIC PNL TOTAL CA: CPT | Performed by: EMERGENCY MEDICINE

## 2018-06-25 PROCEDURE — 81001 URINALYSIS AUTO W/SCOPE: CPT | Performed by: EMERGENCY MEDICINE

## 2018-06-25 PROCEDURE — 84484 ASSAY OF TROPONIN QUANT: CPT | Performed by: EMERGENCY MEDICINE

## 2018-06-25 PROCEDURE — 99285 EMERGENCY DEPT VISIT HI MDM: CPT

## 2018-06-25 PROCEDURE — 70551 MRI BRAIN STEM W/O DYE: CPT | Performed by: OTHER

## 2018-06-25 PROCEDURE — 36415 COLL VENOUS BLD VENIPUNCTURE: CPT

## 2018-06-25 PROCEDURE — 80061 LIPID PANEL: CPT | Performed by: EMERGENCY MEDICINE

## 2018-06-25 RX ORDER — ONDANSETRON 2 MG/ML
4 INJECTION INTRAMUSCULAR; INTRAVENOUS EVERY 6 HOURS PRN
Status: DISCONTINUED | OUTPATIENT
Start: 2018-06-25 | End: 2018-06-26

## 2018-06-25 RX ORDER — METOCLOPRAMIDE HYDROCHLORIDE 5 MG/ML
5 INJECTION INTRAMUSCULAR; INTRAVENOUS EVERY 8 HOURS PRN
Status: DISCONTINUED | OUTPATIENT
Start: 2018-06-25 | End: 2018-06-28

## 2018-06-25 RX ORDER — SPIRONOLACTONE 25 MG/1
25 TABLET ORAL DAILY
Status: DISCONTINUED | OUTPATIENT
Start: 2018-06-26 | End: 2018-06-28

## 2018-06-25 RX ORDER — COLCHICINE 0.6 MG/1
0.6 TABLET ORAL DAILY
Status: ON HOLD | COMMUNITY
End: 2018-10-03

## 2018-06-25 RX ORDER — ATORVASTATIN CALCIUM 20 MG/1
20 TABLET, FILM COATED ORAL NIGHTLY
Status: ON HOLD | COMMUNITY
End: 2018-10-03

## 2018-06-25 RX ORDER — METOCLOPRAMIDE HYDROCHLORIDE 5 MG/ML
10 INJECTION INTRAMUSCULAR; INTRAVENOUS EVERY 8 HOURS PRN
Status: DISCONTINUED | OUTPATIENT
Start: 2018-06-25 | End: 2018-06-26

## 2018-06-25 RX ORDER — ACETAMINOPHEN 325 MG/1
650 TABLET ORAL EVERY 6 HOURS PRN
Status: DISCONTINUED | OUTPATIENT
Start: 2018-06-25 | End: 2018-06-28

## 2018-06-25 RX ORDER — LISINOPRIL 20 MG/1
20 TABLET ORAL DAILY
Status: ON HOLD | COMMUNITY
End: 2018-10-03

## 2018-06-25 RX ORDER — COLCHICINE 0.6 MG/1
0.6 TABLET ORAL DAILY
Status: DISCONTINUED | OUTPATIENT
Start: 2018-06-26 | End: 2018-06-28

## 2018-06-25 RX ORDER — ISOSORBIDE MONONITRATE 30 MG/1
30 TABLET, EXTENDED RELEASE ORAL DAILY
Status: DISCONTINUED | OUTPATIENT
Start: 2018-06-25 | End: 2018-06-28

## 2018-06-25 RX ORDER — LISINOPRIL 20 MG/1
20 TABLET ORAL DAILY
Status: DISCONTINUED | OUTPATIENT
Start: 2018-06-26 | End: 2018-06-28

## 2018-06-25 RX ORDER — LATANOPROST 50 UG/ML
1 SOLUTION/ DROPS OPHTHALMIC NIGHTLY
Status: DISCONTINUED | OUTPATIENT
Start: 2018-06-25 | End: 2018-06-28

## 2018-06-25 RX ORDER — CLOPIDOGREL BISULFATE 75 MG/1
75 TABLET ORAL DAILY
Status: DISCONTINUED | OUTPATIENT
Start: 2018-06-26 | End: 2018-06-28

## 2018-06-25 RX ORDER — HYDRALAZINE HYDROCHLORIDE 25 MG/1
25 TABLET, FILM COATED ORAL 3 TIMES DAILY
Status: ON HOLD | COMMUNITY
End: 2018-10-03

## 2018-06-25 RX ORDER — DOCUSATE SODIUM 100 MG/1
100 CAPSULE, LIQUID FILLED ORAL 2 TIMES DAILY
Status: DISCONTINUED | OUTPATIENT
Start: 2018-06-25 | End: 2018-06-28

## 2018-06-25 RX ORDER — SODIUM CHLORIDE 0.9 % (FLUSH) 0.9 %
3 SYRINGE (ML) INJECTION AS NEEDED
Status: DISCONTINUED | OUTPATIENT
Start: 2018-06-25 | End: 2018-06-28

## 2018-06-25 RX ORDER — HYDRALAZINE HYDROCHLORIDE 25 MG/1
25 TABLET, FILM COATED ORAL 3 TIMES DAILY
Status: DISCONTINUED | OUTPATIENT
Start: 2018-06-25 | End: 2018-06-25

## 2018-06-25 RX ORDER — FUROSEMIDE 20 MG/1
20 TABLET ORAL DAILY
Status: ON HOLD | COMMUNITY
End: 2018-10-03

## 2018-06-25 RX ORDER — BISACODYL 10 MG
10 SUPPOSITORY, RECTAL RECTAL
Status: DISCONTINUED | OUTPATIENT
Start: 2018-06-25 | End: 2018-06-28

## 2018-06-25 RX ORDER — FAMOTIDINE 20 MG/1
20 TABLET ORAL DAILY
Status: DISCONTINUED | OUTPATIENT
Start: 2018-06-25 | End: 2018-06-28

## 2018-06-25 RX ORDER — DORZOLAMIDE HYDROCHLORIDE AND TIMOLOL MALEATE 20; 5 MG/ML; MG/ML
1 SOLUTION/ DROPS OPHTHALMIC 2 TIMES DAILY
Status: ON HOLD | COMMUNITY
End: 2020-05-21

## 2018-06-25 RX ORDER — PIOGLITAZONEHYDROCHLORIDE 15 MG/1
15 TABLET ORAL DAILY
Status: DISCONTINUED | OUTPATIENT
Start: 2018-06-25 | End: 2018-06-28

## 2018-06-25 RX ORDER — GLYBURIDE 5 MG/1
5 TABLET ORAL
Status: ON HOLD | COMMUNITY
End: 2018-10-03

## 2018-06-25 RX ORDER — ATORVASTATIN CALCIUM 20 MG/1
20 TABLET, FILM COATED ORAL NIGHTLY
Status: DISCONTINUED | OUTPATIENT
Start: 2018-06-25 | End: 2018-06-28

## 2018-06-25 RX ORDER — CARVEDILOL 25 MG/1
25 TABLET ORAL 2 TIMES DAILY WITH MEALS
Status: DISCONTINUED | OUTPATIENT
Start: 2018-06-25 | End: 2018-06-28

## 2018-06-25 RX ORDER — ONDANSETRON 2 MG/ML
4 INJECTION INTRAMUSCULAR; INTRAVENOUS EVERY 6 HOURS PRN
Status: DISCONTINUED | OUTPATIENT
Start: 2018-06-25 | End: 2018-06-25

## 2018-06-25 RX ORDER — ALBUTEROL SULFATE 90 UG/1
2 AEROSOL, METERED RESPIRATORY (INHALATION) EVERY 4 HOURS PRN
Status: DISCONTINUED | OUTPATIENT
Start: 2018-06-25 | End: 2018-06-28

## 2018-06-25 RX ORDER — FERROUS GLUCONATE 324(37.5)
324 TABLET ORAL
Status: DISCONTINUED | OUTPATIENT
Start: 2018-06-26 | End: 2018-06-28

## 2018-06-25 RX ORDER — ISOSORBIDE MONONITRATE 30 MG/1
30 TABLET, EXTENDED RELEASE ORAL DAILY
Status: DISCONTINUED | OUTPATIENT
Start: 2018-06-25 | End: 2018-06-25

## 2018-06-25 RX ORDER — ISOSORBIDE MONONITRATE 30 MG/1
30 TABLET, EXTENDED RELEASE ORAL DAILY
Status: ON HOLD | COMMUNITY
End: 2018-10-03

## 2018-06-25 RX ORDER — FUROSEMIDE 20 MG/1
20 TABLET ORAL DAILY
Status: DISCONTINUED | OUTPATIENT
Start: 2018-06-26 | End: 2018-06-28

## 2018-06-25 RX ORDER — SPIRONOLACTONE 25 MG/1
25 TABLET ORAL DAILY
Status: ON HOLD | COMMUNITY
End: 2018-10-03

## 2018-06-25 RX ORDER — DORZOLAMIDE HYDROCHLORIDE AND TIMOLOL MALEATE 20; 5 MG/ML; MG/ML
1 SOLUTION/ DROPS OPHTHALMIC 2 TIMES DAILY
Status: DISCONTINUED | OUTPATIENT
Start: 2018-06-25 | End: 2018-06-28

## 2018-06-25 RX ORDER — SENNOSIDES 8.6 MG
17.2 TABLET ORAL NIGHTLY
Status: DISCONTINUED | OUTPATIENT
Start: 2018-06-25 | End: 2018-06-28

## 2018-06-25 RX ORDER — POLYETHYLENE GLYCOL 3350 17 G/17G
17 POWDER, FOR SOLUTION ORAL DAILY PRN
Status: DISCONTINUED | OUTPATIENT
Start: 2018-06-25 | End: 2018-06-28

## 2018-06-25 RX ORDER — HYDRALAZINE HYDROCHLORIDE 25 MG/1
25 TABLET, FILM COATED ORAL 3 TIMES DAILY
Status: DISCONTINUED | OUTPATIENT
Start: 2018-06-25 | End: 2018-06-28

## 2018-06-25 RX ORDER — FAMOTIDINE 10 MG/ML
20 INJECTION, SOLUTION INTRAVENOUS DAILY
Status: DISCONTINUED | OUTPATIENT
Start: 2018-06-25 | End: 2018-06-28

## 2018-06-25 NOTE — CM/SW NOTE
Met with family regarding a new Neurologist. Dr. Chris Wagner called and he is suggested Dr. Sera Nieto. Dr. Sera Nieto speaks with Dr. Escobar about new consult. Family updated.

## 2018-06-25 NOTE — ED INITIAL ASSESSMENT (HPI)
Pt came in with family for possible wake up stroke. Pt reported HA last night around 6pm but was acting her norm when she went to bed around 1030. When pt woke up this morning she was confused, slurring her speech. Hx of stroke, takes plavix.  No previous d

## 2018-06-25 NOTE — PROGRESS NOTES
[facility] Pharmacy Note:  Renal Dose Adjustment for Metoclopramide (REGLAN)    Dereck Irwin has been prescribed Metoclopramide (REGLAN) 10 mg every 8 hours as needed for n/v.    Estimated Creatinine Clearance: 19.6 mL/min (A) (based on SCr of 1.81 mg/dL (H)).

## 2018-06-25 NOTE — ED PROVIDER NOTES
Patient Seen in: [facility] AND [facility] Emergency Department    History   Patient presents with:  Altered Mental Status (neurologic)    Stated Complaint: stroke like symptoms starting at 10pm last night    HPI    72-year-old female presents for evaluation of tobacco: Never Used                      Alcohol use: No                Review of Systems    Positive for stated complaint: stroke like symptoms starting at 10pm last night  Other systems are as noted in HPI. Constitutional and vital signs reviewed. Date: 6/25/2018  CONCLUSION:  1. No acute intracranial process by noncontrast CT technique.  If symptoms or suspicion for acute ischemia persist, consider followup brain MR. 2. Senescent changes of parenchymal volume loss with sequela of chronic microangiop Abnormal; Notable for the following:     Non HDL Chol 139 (*)     LDL Cholesterol 117 (*)     All other components within normal limits   TROPONIN I - Abnormal; Notable for the following:     Troponin 0.05 (*)     All other components within normal limits bedside. Disposition and Plan     Clinical Impression:  Confusion  (primary encounter diagnosis)    Disposition:  Admit  6/25/2018 12:38 pm    Follow-up:  No follow-up provider specified.   We recommend that you schedule follow up care with a primary car

## 2018-06-25 NOTE — H&P
[facility]    PATIENT'S NAME: Bang MCMILLAN   ATTENDING PHYSICIAN: Stevie Yost MD   PATIENT ACCOUNT#:   [id number]    LOCATION:  [address] [place] [address] #:   [id number]       YOB: 1935  ADMISSION DATE:       06/25/201 Bidil 20/37. 5 two tablets twice a day, and Imdur 30 mg a day, Xalatan 0.005% ophthalmic solution 1 drop both eyes at night, lisinopril 20 mg a day, Actos 15 mg a day, Januvia 25 mg a day, Aldactone 25 mg a day.     ALLERGIES:  Meloxicam, mushrooms, and peni and LDL of 117. BMP reveals a GFR of 26. INR is 1.1. CBC within normal limits. CT scan of the head reveals no acute intracranial process by noncontrast CT technique. Chronic lacunar infarcts involving the right coronal radiata.   Stable dural-based

## 2018-06-26 ENCOUNTER — APPOINTMENT (OUTPATIENT)
Dept: ULTRASOUND IMAGING | Facility: HOSPITAL | Age: 83
DRG: 069 | End: 2018-06-26
Attending: INTERNAL MEDICINE
Payer: MEDICARE

## 2018-06-26 ENCOUNTER — APPOINTMENT (OUTPATIENT)
Dept: CV DIAGNOSTICS | Facility: HOSPITAL | Age: 83
DRG: 069 | End: 2018-06-26
Attending: INTERNAL MEDICINE
Payer: MEDICARE

## 2018-06-26 LAB
GLUCOSE BLDC GLUCOMTR-MCNC: 109 MG/DL (ref 70–99)
GLUCOSE BLDC GLUCOMTR-MCNC: 112 MG/DL (ref 70–99)
GLUCOSE BLDC GLUCOMTR-MCNC: 129 MG/DL (ref 70–99)
GLUCOSE BLDC GLUCOMTR-MCNC: 148 MG/DL (ref 70–99)
HBA1C MFR BLD: 6.2 % (ref 4–6)

## 2018-06-26 PROCEDURE — 97161 PT EVAL LOW COMPLEX 20 MIN: CPT

## 2018-06-26 PROCEDURE — 95951 HC EEG MONITORING W VIDEO EA 24 HRS: CPT

## 2018-06-26 PROCEDURE — 82962 GLUCOSE BLOOD TEST: CPT

## 2018-06-26 PROCEDURE — 92610 EVALUATE SWALLOWING FUNCTION: CPT

## 2018-06-26 PROCEDURE — 97166 OT EVAL MOD COMPLEX 45 MIN: CPT

## 2018-06-26 PROCEDURE — 93880 EXTRACRANIAL BILAT STUDY: CPT | Performed by: INTERNAL MEDICINE

## 2018-06-26 PROCEDURE — 95816 EEG AWAKE AND DROWSY: CPT

## 2018-06-26 PROCEDURE — 93306 TTE W/DOPPLER COMPLETE: CPT | Performed by: INTERNAL MEDICINE

## 2018-06-26 PROCEDURE — 97116 GAIT TRAINING THERAPY: CPT

## 2018-06-26 RX ORDER — METOCLOPRAMIDE HYDROCHLORIDE 5 MG/ML
5 INJECTION INTRAMUSCULAR; INTRAVENOUS EVERY 8 HOURS PRN
Status: DISCONTINUED | OUTPATIENT
Start: 2018-06-26 | End: 2018-06-28

## 2018-06-26 RX ORDER — ONDANSETRON 2 MG/ML
4 INJECTION INTRAMUSCULAR; INTRAVENOUS EVERY 6 HOURS PRN
Status: DISCONTINUED | OUTPATIENT
Start: 2018-06-26 | End: 2018-06-28

## 2018-06-26 NOTE — PROGRESS NOTES
[facility]    Progress Note    Maged Emanuel Patient Status:  Observation    1935 MRN [id number]   Location [facility] 3W/SW Attending Elmira Brown MD   Hosp Day # 0 PCP Brianna Burgess MD       Subjective:    Maged Emanuel 06/25/18   1314   TROP  0.05*  0.05*       Recent Labs   Lab  06/25/18   1030   INR  1.1       Recent Labs   Lab  06/25/18   0950  06/25/18   1347  06/25/18   1649  06/25/18   2118   PGLU  120*  101*  111*  136*       Scheduled Meds:   • atorvastatin  20 m No acute or subacute infarction. Chronic infarcts in the right cerebrum, left cerebellum, and the thomas.   Dictated by (CST): Iliana Nelson MD on 6/25/2018 at 16:33     Approved by (CST): Iliana Nelson MD on 6/25/2018 at 16:40          Ekg 12-lead

## 2018-06-26 NOTE — HISTORICAL OFFICE NOTE
Emma November  : 1935  ACCOUNT:  [id number]  [id number]  PCP: Dr. Jamie Eastman     TODAY'S DATE: 10/24/2016  DICTATED BY:  Marlys Bashir M.D.]    CHIEF COMPLAINT: [Followup of .  Heart failure, systolic, Followup of Cardiomyopathy and ischemic.]    H 132/70 , Pulse - 80, Respiration - 20, Weight -  167, Height -   61 , BMI - 31.6 ]    CONS: well developed, well nourished. WEIGHT: BMI parameters reviewed and discussed. HEAD/FACE: no trauma and normocephalic.  EYES: conjunctivae not injected and no xanthe Start    Med Name              Dose      Instructions                             05/19/16 *Simvastatin          80MG      1 TABLET DAILY AT BEDTIME.               02/22/16 *Clopidogrel [id number]      1 TABLET DAILY                           02/16/16 mm Hg (S, est). · Tricuspid valve: Mild regurgitation. · In comparison to prior study, no significant change.   Study data:

## 2018-06-26 NOTE — PROGRESS NOTES
[facility] Pharmacy Note:  Renal Dose Adjustment for Metoclopramide (REGLAN)    Martha Quintero has been prescribed Metoclopramide (REGLAN) 10 mg every 8 hours as needed for n/v.    Estimated Creatinine Clearance: 19.6 mL/min (A) (based on SCr of 1.81 mg/dL (H)).

## 2018-06-26 NOTE — PLAN OF CARE
Problem: Diabetes/Glucose Control  Goal: Glucose maintained within prescribed range  INTERVENTIONS:  - Monitor Blood Glucose as ordered  - Assess for signs and symptoms of hyperglycemia and hypoglycemia  - Administer ordered medications to maintain glucose Progressing      Problem: SAFETY ADULT - FALL  Goal: Free from fall injury  INTERVENTIONS:  - Assess pt frequently for physical needs  - Identify cognitive and physical deficits and behaviors that affect risk of falls.   - [place] fall precautions as jordan

## 2018-06-26 NOTE — CONSULTS
[facility]    Cardiology Consultation    Maurice Fung Patient Status:  Inpatient    1935 MRN [id number]   Location [facility] 3W/SW Attending Solomon Schwartz MD   [facility] Day # 1 PCP Noel Uribe MD     2018  Reason for without mention of complication, not stated as uncontrolled      History reviewed. No pertinent surgical history.   Family History   Problem Relation Age of Onset   • Hypertension Other      per ng family hx   • Diabetes Other      per ng family hx   • Canc PRN  •  Metoclopramide HCl (REGLAN) injection 5 mg, 5 mg, Intravenous, Q8H PRN  •  docusate sodium (COLACE) cap 100 mg, 100 mg, Oral, BID  •  PEG 3350 (MIRALAX) powder packet 17 g, 17 g, Oral, Daily PRN  •  bisacodyl (DULCOLAX) rectal suppository 10 mg, 10 BS-present. Extremities: Without clubbing, cyanosis or edema. Peripheral pulses are 2+. Neurologic: Alert and oriented, normal affect. Skin: Warm and dry.      Laboratory Data:    Lab Results  Component Value Date   PGLU 109 06/26/2018       Imaging:  M (chronic kidney disease) stage 3, GFR 30-59 ml/min (HCC)     Chronic kidney disease, stage III (moderate) (HCC)     Right sided abdominal pain     Acute gouty arthritis     Inability to ambulate due to multiple joints     Acute arthritis     Confusion

## 2018-06-26 NOTE — PHYSICAL THERAPY NOTE
PHYSICAL THERAPY EVALUATION - INPATIENT     Room Number: 276/073-X  Evaluation Date: 6/26/2018  Type of Evaluation: Initial   Physician Order: PT Eval and Treat    Presenting Problem: p/w confusion, slurred speech, HA  Reason for Therapy: Mobility Dysfunc pre-admission status. Patient requires skilled PT to address deficits and maximize patient independence.  As pt has 24 hr assist at home for ADLs and pt is close to functional baseline, anticipate pt will be able to d/c home with continued participation in With: Spouse; Family  Drives: No  Patient Owned Equipment: Cane  Patient Regularly Uses: None    Prior Level of [place]: Pt lives with her elderly  in a 1 story house with 5 MAR.  Pt has 24 hr care/supervision from grand-daughters and nephew who back to sitting on the side of the bed?: A Little   How much help from another person does the patient currently need. ..   -   Moving to and from a bed to a chair (including a wheelchair)?: A Little   -   Need to walk in hospital room?: A [address] negotiate 5 stairs/one curb w/ assistive device and Min A   Goal #4   Current Status    Goal #5 Patient to demonstrate independence with home activity/exercise instructions provided to patient in preparation for discharge.    Goal #5   Current Status    [facility]

## 2018-06-26 NOTE — SLP NOTE
ADULT SWALLOWING EVALUATION    ASSESSMENT    ASSESSMENT/OVERALL IMPRESSION:        Pt assessed sitting upright in bed self-feeding items from breakfast tray as SLP entered room.  Pt was self-initiating large bites with cough response noted on omelette (cont straws; Alternate consistencies  Aspiration Precautions: Upright position; Slow rate;Small bites and sips; No straw  Medication Administration Recommendations: One pill at a time  Treatment Plan/Recommendations: Dysphagia therapy; Aspiration precautions  Disch hemisphere. These appear unchanged since March, 2014. 4. Stable dural based calcification along the right anterior cranial fossa that measures approximately 1 cm and most likely reflects a calcified meningioma. 5. Lesser incidental findings as above. 06/27/18    Thank you for your referral.   If you have any questions, please contact     Estee Medrano M.S. CCC/SLP  Speech-Language Pathologist  [facility]  #[phone]

## 2018-06-26 NOTE — OCCUPATIONAL THERAPY NOTE
OCCUPATIONAL THERAPY EVALUATION - INPATIENT     Room Number: 798/888-L  Evaluation Date: 6/26/2018  Type of Evaluation: Initial  Presenting Problem: slurred speech, word salad, AMS    Physician Order: IP Consult to Occupational Therapy  Reason for Therapy: with home health PT       PLAN  OT Treatment Plan: Balance activities; ADL training;Functional transfer training;UE strengthening/ROM; Endurance training;Patient/Family education       OCCUPATIONAL THERAPY MEDICAL/SOCIAL HISTORY     Problem List  Principal P risk    PAIN ASSESSMENT  Ratin          ACTIVITY TOLERANCE  O2 Saturation at rest 99% and with activity 97%  Room air  Heart Rate: at rest 72 and with activity 74    COGNITION  Orientation Level:  oriented to place and oriented to person  Following Com Transfer: NT  Chair Transfer: CGA/Min A    Bedroom Mobility: Min A with cane and hand held assist    BALANCE ASSESSMENT  Static Sitting: good  Dynamic Sitting: fair  Static Standing: fair  Dynamic Standing: fair    FUNCTIONAL ADL ASSESSMENT  Grooming: CGA

## 2018-06-26 NOTE — CONSULTS
[facility]    PATIENT'S NAME: Betzy MCMILLAN   ATTENDING PHYSICIAN: Stevie Hammer MD   CONSULTING PHYSICIAN: Antionette Llanos.  Edgar Jones MD   PATIENT ACCOUNT#:   [id number]    LOCATION:  3WSW [address] #:   [id number]       DATE OF BIRTH: degree of atrophy. IMPRESSION:  The patient developed isolated aphasia. This is typically caused by a small cortical stroke; however, there was no evidence of it on the MRI scan.   Alternatively, this can be seen with a dementia although it would seem t

## 2018-06-27 LAB
ANION GAP SERPL CALC-SCNC: 6 MMOL/L (ref 0–18)
BUN SERPL-MCNC: 25 MG/DL (ref 8–20)
BUN/CREAT SERPL: 17.7 (ref 10–20)
CALCIUM SERPL-MCNC: 8.9 MG/DL (ref 8.5–10.5)
CHLORIDE SERPL-SCNC: 106 MMOL/L (ref 95–110)
CO2 SERPL-SCNC: 28 MMOL/L (ref 22–32)
CREAT SERPL-MCNC: 1.41 MG/DL (ref 0.5–1.5)
GLUCOSE BLDC GLUCOMTR-MCNC: 115 MG/DL (ref 70–99)
GLUCOSE BLDC GLUCOMTR-MCNC: 139 MG/DL (ref 70–99)
GLUCOSE BLDC GLUCOMTR-MCNC: 154 MG/DL (ref 70–99)
GLUCOSE BLDC GLUCOMTR-MCNC: 168 MG/DL (ref 70–99)
GLUCOSE SERPL-MCNC: 132 MG/DL (ref 70–99)
OSMOLALITY UR CALC.SUM OF ELEC: 296 MOSM/KG (ref 275–295)
POTASSIUM SERPL-SCNC: 4.5 MMOL/L (ref 3.3–5.1)
SODIUM SERPL-SCNC: 140 MMOL/L (ref 136–144)
TSH SERPL-ACNC: 3.44 UIU/ML (ref 0.45–5.33)
VIT B12 SERPL-MCNC: 477 PG/ML (ref 181–914)

## 2018-06-27 PROCEDURE — 80048 BASIC METABOLIC PNL TOTAL CA: CPT | Performed by: OTHER

## 2018-06-27 PROCEDURE — 84443 ASSAY THYROID STIM HORMONE: CPT | Performed by: OTHER

## 2018-06-27 PROCEDURE — 82962 GLUCOSE BLOOD TEST: CPT

## 2018-06-27 PROCEDURE — 82607 VITAMIN B-12: CPT | Performed by: OTHER

## 2018-06-27 PROCEDURE — 84425 ASSAY OF VITAMIN B-1: CPT | Performed by: OTHER

## 2018-06-27 PROCEDURE — 92526 ORAL FUNCTION THERAPY: CPT

## 2018-06-27 NOTE — CM/SW NOTE
06/27/18 1000   CM/SW Screening   Patient's Mental Status Alert;Memory Impairments   Patient's Home Environment House   Number of Levels in Home 1   Number of Stair in Home (7 to enter)   Patient lives with Spouse   Patient Status Prior to Admission   I

## 2018-06-27 NOTE — TRANSITION NOTE
History of Present Illness:  Kurt Chew is a a(n) [de-identified]year old female who presents with slurred and confusion started 6/25 in the evening. Brought in to the ED. Now symptoms are improving. Hx of med non compliance, extensive cardiac and medical Hx.   See scheduled    • atorvastatin  20 mg Oral Nightly   • Brimonidine Tartrate-Timolol  1 drop Both Eyes Q12H   • carvedilol  25 mg Oral BID with meals   • Clopidogrel Bisulfate  75 mg Oral Daily   • colchicine  0.6 mg Oral Daily   • Dorzolamide HCl-Timolol Mal

## 2018-06-27 NOTE — PROCEDURES
20 channel digital EEG  10-20 electrode placement  Bipolar and referential montages    Background rhythm 8-10Hz  No focal slowing  No seizure activity  Patient achieved sleep without abnormal activation  Photic stimulation and hyperventilation were not don

## 2018-06-27 NOTE — SLP NOTE
SPEECH DAILY NOTE - INPATIENT    ASSESSMENT & PLAN   ASSESSMENT  Pt seen for swallowing therapy to monitor swallowing tolerance and train swallowing precautions. Collaborated with RN, whom reports pt is tolerating diet well.   Pt seen for swallowing therap swallow delayed 2 seconds with reduced bolus control/a-p propulsion. Bite strength decreased with increased duration of time required to masticate solids. Minimal lingual stasis was cleared with a dry multiple swallow.   Pharyngeal phase of swallow appear

## 2018-06-27 NOTE — CM/SW NOTE
+BPCI Readmission   Patient is a BPCI readmission previously enrolled on 4/22/2018 under  with 23 days left in the BPCI episode which will end on 7/20/2018.

## 2018-06-27 NOTE — PROGRESS NOTES
06/26/18  0800 06/26/18  1200 06/26/18  1600 06/26/18 2137   BP: 146/77 118/56 132/82 116/63   Pulse:    68   Resp: 18 20 20 16   Temp: 98.5 °F (36.9 °C) 98.2 °F (36.8 °C) 98.2 °F (36.8 °C) 98.2 °F (36.8 °C)   TempSrc: Oral Oral Oral Oral   SpO2: 99% 96%

## 2018-06-27 NOTE — PROGRESS NOTES
[facility]    Progress Note    James Vieyra Patient Status:  Observation    1935 MRN [id number]   Location [facility] 3W/SW Attending Abdiel Varela MD   Hosp Day # 2 PCP Jose Caraballo MD       Subjective:    James Vieyra 174       Recent Labs   Lab  06/25/18   1030   GLU  131*   BUN  16   CREATSERUM  1.81*   GFRAA  30*   GFRNAA  26*   CA  8.6   NA  140   K  4.2   CL  107   CO2  24       No results for input(s): DANNA CASTILLO in the last 168 hours.     Recent Labs   Lab  06/25/18 reflects a calcified meningioma. 5. Lesser incidental findings as above.      Dictated by (CST): Ana Soriano MD on 6/25/2018 at 10:20     Approved by (CST): Ana Soriano MD on 6/25/2018 at 10:25          Mri Brain (cpt=[id number])    Result Date: 6/25/201

## 2018-06-27 NOTE — PHYSICAL THERAPY NOTE
Chart reviewed, attempted to see pt for PT treatment. Pt currently on EEG for 24 hrs, which began at 6 pm last night. RN anticipates EEG my be removed earlier. Will continue to follow and initiate PT treatment as appropriate, schedule permitting.

## 2018-06-27 NOTE — HOME CARE LIAISON
Met at bedside with patient and her daughter Tiffanie Cleveland,   (C: [phone]), to discuss home health services.  Tiffanie Cleveland stated she is interested in services, however, she wants to discuss with patient's spouse and will recontact liaison later today or tomorrow, wi

## 2018-06-27 NOTE — PROCEDURES
10-20 electrode placement  Bipolar and referential montages  24 hour video EEG monitoring    Background rhythm 8-10Hz  No focal slowing  No seizure activity  Patient achieved sleep without abnormal activation  Photic stimulation and hyperventilation were n

## 2018-06-28 VITALS
RESPIRATION RATE: 20 BRPM | DIASTOLIC BLOOD PRESSURE: 61 MMHG | TEMPERATURE: 98 F | HEIGHT: 62 IN | HEART RATE: 67 BPM | BODY MASS INDEX: 26.87 KG/M2 | WEIGHT: 146 LBS | SYSTOLIC BLOOD PRESSURE: 89 MMHG | OXYGEN SATURATION: 100 %

## 2018-06-28 LAB — GLUCOSE BLDC GLUCOMTR-MCNC: 121 MG/DL (ref 70–99)

## 2018-06-28 PROCEDURE — 92526 ORAL FUNCTION THERAPY: CPT

## 2018-06-28 PROCEDURE — 82962 GLUCOSE BLOOD TEST: CPT

## 2018-06-28 NOTE — HOME CARE LIAISON
Met with patient and her daughter Emily Cueto again. Per Emily Cueto, patient's spouse also agreeable to home health. Family has Residential contact information and liaison's business card.

## 2018-06-28 NOTE — PROGRESS NOTES
06/27/18  0500 06/27/18  0916 06/27/18  1612 06/27/18  1844   BP:  129/76 123/61 114/59   Pulse:  65 70 70   Resp:  18 18 18   Temp:  97.7 °F (36.5 °C)     TempSrc:  Oral     SpO2:  100% 100% 100%   Weight: 147 lb 8 oz (66.9 kg)      Height:             B

## 2018-06-28 NOTE — PHYSICAL THERAPY NOTE
Chart reviewed, attempted to see pt for stair training in preparation for d/c home today. Pt and family politely declining reporting they assist her on the stairs and have no concerns for d/c home.  Instructed pt and family to use both rails for stair navig

## 2018-06-28 NOTE — PLAN OF CARE
Problem: Diabetes/Glucose Control  Goal: Glucose maintained within prescribed range  INTERVENTIONS:  - Monitor Blood Glucose as ordered  - Assess for signs and symptoms of hyperglycemia and hypoglycemia  - Administer ordered medications to maintain glucose Adequate for Discharge  Patient lives with  and daughter is very involved in the patient care. Patient daughter will take to father and decide if they want to proceed with [facility].      Problem: SAFETY ADULT - FALL  Goal: Free from fall injury  INTERVENT

## 2018-06-28 NOTE — SLP NOTE
SPEECH DAILY NOTE - INPATIENT    ASSESSMENT & PLAN   ASSESSMENT  Pt seen for swallowing therapy to monitor swallowing tolerance and train swallowing precautions. Collaborated with RN, whom reports pt is tolerating diet well.   Pt seen for swallowing therap FOLLOW UP  Follow Up Needed: Yes  SLP Follow-up Date: 06/28/18  Number of Visits to Meet Established Goals: 3    Session: 2 post BSE    If you have any questions, please contact   Lilly Kramer MA/LANCE-SLP  Speech Language Pathologist  [facility]

## 2018-06-28 NOTE — PROGRESS NOTES
[facility]    Progress Note    Riley Blackwood Patient Status:  Observation    1935 MRN [id number]   Location [facility] 3W/SW Attending Douglas Flores MD   Hosp Day # 3 PCP Giselle Cherry MD       Subjective:    Riley Blackwood contributing to missing medication as well. Patient has not been doing as well for the last few years has required much assistance for activities of daily living. Dr. Roseann Samano will begin dementia medication as an outpatient, possibly Exelon.         Results 3. Subcentimeter right thyroid cyst.    Dictated by (CST): Miranda Pace MD on 6/26/2018 at 7:37     Approved by (CST):  Miranda Pace MD on 6/26/2018 at 7:41                  Christiano Hu MD  6/28/2018

## 2018-06-28 NOTE — OCCUPATIONAL THERAPY NOTE
Pt not seen for OT at this time secondary to family present and politely declining. Family stating that they assist with all ADLS , and will continue to do so once pt is discharged to home this date.  Family stating that there are no concerns with ADLS  At

## 2018-07-05 LAB — VITAMIN B1 (THIAMINE), WHOLE B: 63 NMOL/L

## 2018-07-14 NOTE — DISCHARGE PLANNING
3:10pm Update- TAMRA spoke with dtr Franki Molina [phone] who confirmed the plan for pt to return home. She is aware that the pt is stable for dc and that attempts had been made to speak with the father.  Dtr intends to pickup the pt about 5:15pm. She will be pre Event Note

## 2018-07-26 NOTE — DISCHARGE SUMMARY
[facility]    PATIENT'S NAME: Danay MCMILLAN   ATTENDING PHYSICIAN: Stevie Singh MD   PATIENT ACCOUNT#:   [id number]    LOCATION:  [address] [place] [address] #:   [id number]       YOB: 1935  ADMISSION DATE:       06/25/201 left axis deviation on 06/25/2018. Therefore, no anticoagulation from a novel agent would be indicated at this point according to Cardiology. The patient was given a 20-channel digital EEG with 10 to 20 electrode placements.   Impression was a normal EEG

## 2018-09-30 ENCOUNTER — HOSPITAL ENCOUNTER (INPATIENT)
Facility: HOSPITAL | Age: 83
LOS: 6 days | Discharge: HOME | DRG: 286 | End: 2018-10-08
Attending: EMERGENCY MEDICINE | Admitting: INTERNAL MEDICINE
Payer: MEDICARE

## 2018-09-30 ENCOUNTER — APPOINTMENT (OUTPATIENT)
Dept: GENERAL RADIOLOGY | Facility: HOSPITAL | Age: 83
DRG: 286 | End: 2018-09-30
Attending: EMERGENCY MEDICINE
Payer: MEDICARE

## 2018-09-30 DIAGNOSIS — I50.9 ACUTE ON CHRONIC CONGESTIVE HEART FAILURE, UNSPECIFIED HEART FAILURE TYPE (HCC): Primary | ICD-10-CM

## 2018-09-30 PROCEDURE — 96374 THER/PROPH/DIAG INJ IV PUSH: CPT

## 2018-09-30 PROCEDURE — 71045 X-RAY EXAM CHEST 1 VIEW: CPT | Performed by: EMERGENCY MEDICINE

## 2018-09-30 PROCEDURE — 83036 HEMOGLOBIN GLYCOSYLATED A1C: CPT | Performed by: INTERNAL MEDICINE

## 2018-09-30 PROCEDURE — 84484 ASSAY OF TROPONIN QUANT: CPT | Performed by: EMERGENCY MEDICINE

## 2018-09-30 PROCEDURE — 93010 ELECTROCARDIOGRAM REPORT: CPT | Performed by: EMERGENCY MEDICINE

## 2018-09-30 PROCEDURE — 99291 CRITICAL CARE FIRST HOUR: CPT

## 2018-09-30 PROCEDURE — 85025 COMPLETE CBC W/AUTO DIFF WBC: CPT | Performed by: EMERGENCY MEDICINE

## 2018-09-30 PROCEDURE — 93005 ELECTROCARDIOGRAM TRACING: CPT

## 2018-09-30 PROCEDURE — 80048 BASIC METABOLIC PNL TOTAL CA: CPT | Performed by: EMERGENCY MEDICINE

## 2018-09-30 PROCEDURE — 82962 GLUCOSE BLOOD TEST: CPT

## 2018-09-30 PROCEDURE — 83880 ASSAY OF NATRIURETIC PEPTIDE: CPT | Performed by: EMERGENCY MEDICINE

## 2018-09-30 PROCEDURE — 94644 CONT INHLJ TX 1ST HOUR: CPT

## 2018-09-30 RX ORDER — FUROSEMIDE 10 MG/ML
40 INJECTION INTRAMUSCULAR; INTRAVENOUS ONCE
Status: COMPLETED | OUTPATIENT
Start: 2018-09-30 | End: 2018-09-30

## 2018-09-30 RX ORDER — ALBUTEROL SULFATE 2.5 MG/3ML
5 SOLUTION RESPIRATORY (INHALATION) CONTINUOUS
Status: DISCONTINUED | OUTPATIENT
Start: 2018-09-30 | End: 2018-10-08

## 2018-09-30 NOTE — ED NOTES
Pt states has been having increased SOB, more with exertion. States also has been having swelling in legs. Denies cough/congestion. States h/o CHF. States has not taken her water pill in a week.

## 2018-09-30 NOTE — ED PROVIDER NOTES
Patient Seen in: [facility] AND [facility] Emergency Department    History   Patient presents with:  Dyspnea VISHAL SOB (respiratory)    Stated Complaint: Out of breath    HPI    29-year-old female with past medical history significant for CAD, CHF, anemia, GERD, h stated complaint: Out of breath  Other systems are as noted in HPI. Constitutional and vital signs reviewed. All other systems reviewed and negative except as noted above.     Physical Exam     ED Triage Vitals [09/30/18 1616]   BP (!) 141/103   Pulse WITH DIFFERENTIAL WITH PLATELET    Narrative: The following orders were created for panel order CBC WITH DIFFERENTIAL WITH PLATELET.   Procedure                               Abnormality         Status                     --------- consultants/admitting physician, and documenting in patient's chart.           Disposition and Plan     Clinical Impression:  Acute on chronic congestive heart failure, unspecified heart failure type ([facility] [address]  (primary encounter diagnosis)    Disposition:  Adm

## 2018-09-30 NOTE — ED INITIAL ASSESSMENT (HPI)
Pt here with feeling out of breath x 2 weeks. Pt also reports swelling in legs and feet \"I take a water pill\".

## 2018-10-01 PROCEDURE — 97161 PT EVAL LOW COMPLEX 20 MIN: CPT

## 2018-10-01 PROCEDURE — 82962 GLUCOSE BLOOD TEST: CPT

## 2018-10-01 PROCEDURE — A4216 STERILE WATER/SALINE, 10 ML: HCPCS

## 2018-10-01 PROCEDURE — 97116 GAIT TRAINING THERAPY: CPT

## 2018-10-01 RX ORDER — ACETAMINOPHEN 500 MG
500 TABLET ORAL EVERY 6 HOURS PRN
Status: DISCONTINUED | OUTPATIENT
Start: 2018-10-01 | End: 2018-10-08

## 2018-10-01 RX ORDER — SIMVASTATIN 40 MG
40 TABLET ORAL NIGHTLY
Status: ON HOLD | COMMUNITY
End: 2020-05-21

## 2018-10-01 RX ORDER — ISOSORBIDE DINITRATE 20 MG/1
40 TABLET ORAL
Status: DISCONTINUED | OUTPATIENT
Start: 2018-10-01 | End: 2018-10-04

## 2018-10-01 RX ORDER — 0.9 % SODIUM CHLORIDE 0.9 %
3 VIAL (ML) INJECTION AS NEEDED
Status: DISCONTINUED | OUTPATIENT
Start: 2018-10-01 | End: 2018-10-08

## 2018-10-01 RX ORDER — ATORVASTATIN CALCIUM 20 MG/1
20 TABLET, FILM COATED ORAL NIGHTLY
Status: DISCONTINUED | OUTPATIENT
Start: 2018-10-01 | End: 2018-10-08

## 2018-10-01 RX ORDER — CARVEDILOL 25 MG/1
25 TABLET ORAL 2 TIMES DAILY WITH MEALS
Status: DISCONTINUED | OUTPATIENT
Start: 2018-10-01 | End: 2018-10-05

## 2018-10-01 RX ORDER — CLOPIDOGREL BISULFATE 75 MG/1
75 TABLET ORAL DAILY
Status: DISCONTINUED | OUTPATIENT
Start: 2018-10-01 | End: 2018-10-08

## 2018-10-01 RX ORDER — LATANOPROST 50 UG/ML
1 SOLUTION/ DROPS OPHTHALMIC NIGHTLY
Status: DISCONTINUED | OUTPATIENT
Start: 2018-10-01 | End: 2018-10-08

## 2018-10-01 RX ORDER — GLIPIZIDE 10 MG/1
10 TABLET ORAL
Status: ON HOLD | COMMUNITY
End: 2020-05-21

## 2018-10-01 RX ORDER — FUROSEMIDE 10 MG/ML
40 INJECTION INTRAMUSCULAR; INTRAVENOUS EVERY 12 HOURS
Status: DISCONTINUED | OUTPATIENT
Start: 2018-10-01 | End: 2018-10-01

## 2018-10-01 RX ORDER — 0.9 % SODIUM CHLORIDE 0.9 %
VIAL (ML) INJECTION
Status: COMPLETED
Start: 2018-10-01 | End: 2018-10-01

## 2018-10-01 RX ORDER — FUROSEMIDE 10 MG/ML
40 INJECTION INTRAMUSCULAR; INTRAVENOUS 3 TIMES DAILY
Status: DISCONTINUED | OUTPATIENT
Start: 2018-10-01 | End: 2018-10-02

## 2018-10-01 RX ORDER — GLIPIZIDE 10 MG/1
10 TABLET ORAL
Status: DISCONTINUED | OUTPATIENT
Start: 2018-10-01 | End: 2018-10-08

## 2018-10-01 RX ORDER — TORSEMIDE 20 MG/1
20 TABLET ORAL DAILY
Status: ON HOLD | COMMUNITY
End: 2019-05-18

## 2018-10-01 NOTE — PHYSICAL THERAPY NOTE
PHYSICAL THERAPY EVALUATION - INPATIENT     Room Number: 330/330-A  Evaluation Date: 10/1/2018  Type of Evaluation: Initial   Physician Order: PT Eval and Treat    Presenting Problem: p/w SOB x 2 weeks, BLE edema; dx acute on chronic HF  Reason for Therap continued IP PT services to address these deficits in preparation for discharge. DISCHARGE RECOMMENDATIONS  PT Discharge Recommendations: 24 hour care/supervision;Home with home health PT    PLAN  PT Treatment Plan: Bed mobility; Endurance; Energy conserv reports independence with ADLs with use of straight cane or rolling walker for household ambulation. She has assist from her children and grand-daughter for IADLs like driving and cooking.     SUBJECTIVE  \"My son comes in the morning til around 2 and then room?: A Little   -   Climbing 3-5 steps with a railing?: A Little     AM-PAC Score:  Raw Score: 19   PT Approx Degree of Impairment Score: 41.77%   Standardized Score (AM-PAC Scale): 45.44   CMS Modifier (G-Code): CK    FUNCTIONAL ABILITY STATUS  Gait Ass

## 2018-10-01 NOTE — CONSULTS
Urban Eubanks  8/11/1945    Reason for consult: CHF      HPI:   79-year-old female with history of diabetes, ischemic cardiomyopathy, h/o of NSTEMI and LAD stent, EF 30-35%, dynamic mitral regurgitation, normal right ventricular function, improved second tobacco:     Alcohol Use: Not on file    Comment: RARE    Drug Use: No    Sexually Active: Not on file     Other Topics Concern   None on file     Social History Narrative    : 1967    Children: 4    Exercise: walks, total gym    Employment: , precipitating worsening fluid retention and MR  2. rechallenge RAAS inhibition. Will start entresto and assess response  3. Echo   4. IV diuretics   5.  May need Renal Artery eval       Clair Manuel MD, [facility] [place]  Heart Failure Transplant / Pulmonary HTN

## 2018-10-01 NOTE — PROGRESS NOTES
Isosorb Dinitrate-Hydralazine (BIDIL) 20-37.5 MG per tab 2 tablet tid  is Non-Formulary Medication &  Auto-Substituted to hydrALAzine HCl (APRESOLINE) tab 75 mg + isosorbide dinitrate (ISORDIL) tab 40 mg tid Per P&T PROTOCOL

## 2018-10-01 NOTE — H&P
[facility]    PATIENT'S NAME: [facility] PHYSICIAN: Reese Ac MD   PATIENT ACCOUNT#:   [id number]    LOCATION:  [address] [place] [address] #:   [id number]       YOB: 1935  ADMISSION DATE:       09/30/2018 penicillin. FAMILY HISTORY:  Father  in his 46s, and he was an alcoholic. Mother  at 29 from goiter. HABITS:  The patient did smoke in the past, but quit over 40 years ago. She does not drink alcohol or take illicit drugs.       SOCIAL HIST gently diurese the patient. The patient will need followup in the outpatient setting for heart failure. Further recommendations pending clinical course. Dictated By Janna Fonseca MD  d: 10/01/2018 07:47:44  t: 10/01/2018 07:56:21  Brandon Hoover [id number]

## 2018-10-01 NOTE — PROGRESS NOTES
simvastatin (ZOCOR) tab 40 mg qhs  is Non-Formulary Medication &  Auto-Substituted to Atorvastatin 20mg nightly Per P&T PROTOCOL

## 2018-10-01 NOTE — HISTORICAL OFFICE NOTE
Anmol Bustamante  : 1935  ACCOUNT:  [id number]  [phone]  PCP: Dr. Fernanda Carranza     TODAY'S DATE: 10/24/2016  DICTATED BY:  Chuy Gibson M.D.]    CHIEF COMPLAINT: [Followup of .  Heart failure, systolic, Followup of Cardiomyopathy and ischemic.]    H 132/70 , Pulse - 80, Respiration - 20, Weight -  167, Height -   61 , BMI - 31.6 ]    CONS: well developed, well nourished. WEIGHT: BMI parameters reviewed and discussed. HEAD/FACE: no trauma and normocephalic.  EYES: conjunctivae not injected and no xanthe Start    Med Name              Dose      Instructions                             05/19/16 *Simvastatin          80MG      1 TABLET DAILY AT BEDTIME.               02/22/16 *Clopidogrel [id number]      1 TABLET DAILY                           02/16/16

## 2018-10-01 NOTE — CM/SW NOTE
10/01/18 1500   CM/SW Screening   Patient's Mental Status Alert;Oriented   Patient's [address]   Number of Levels in Home 2   Number of Stair in Home (Stays on main level )   Patient lives with Spouse   Patient Status Prior to Admission   I Bladder non-tender and non-distended. Urine clear yellow.

## 2018-10-02 ENCOUNTER — APPOINTMENT (OUTPATIENT)
Dept: ULTRASOUND IMAGING | Facility: HOSPITAL | Age: 83
DRG: 286 | End: 2018-10-02
Attending: INTERNAL MEDICINE
Payer: MEDICARE

## 2018-10-02 ENCOUNTER — APPOINTMENT (OUTPATIENT)
Dept: CV DIAGNOSTICS | Facility: HOSPITAL | Age: 83
DRG: 286 | End: 2018-10-02
Attending: INTERNAL MEDICINE
Payer: MEDICARE

## 2018-10-02 PROCEDURE — 93306 TTE W/DOPPLER COMPLETE: CPT | Performed by: INTERNAL MEDICINE

## 2018-10-02 PROCEDURE — 80069 RENAL FUNCTION PANEL: CPT | Performed by: INTERNAL MEDICINE

## 2018-10-02 PROCEDURE — 76775 US EXAM ABDO BACK WALL LIM: CPT | Performed by: INTERNAL MEDICINE

## 2018-10-02 PROCEDURE — A4216 STERILE WATER/SALINE, 10 ML: HCPCS | Performed by: INTERNAL MEDICINE

## 2018-10-02 PROCEDURE — 4A023N6 MEASUREMENT OF CARDIAC SAMPLING AND PRESSURE, RIGHT HEART, PERCUTANEOUS APPROACH: ICD-10-PCS | Performed by: INTERNAL MEDICINE

## 2018-10-02 PROCEDURE — 82962 GLUCOSE BLOOD TEST: CPT

## 2018-10-02 NOTE — PROGRESS NOTES
[facility]    Progress Note    Yahir Nails Patient Status:  Observation    1935 MRN [id number]   Location [facility] 3W/SW Attending Marcelino Byers MD   Hosp Day # 0 PCP Norberto Souza MD       Subjective:    Ayhir Nails i 06/25/2018 1.81 (H) 0.50 - 1.50 mg/dL Final   04/18/2018 1.10 0.50 - 1.50 mg/dL Final   08/03/2017 1.50 0.50 - 1.50 mg/dL Final     CREATININE (P)   Date Value Ref Range Status   12/17/2014 2.59 (H) 0.50 - 1.50 mg/dL Final   12/09/2014 2.40 (H) 0.50 - 1. Disease: GFR <60 ml/min/1.73 m2  Kidney failure: GFR <15 ml/min/1.73 m2  The accuracy of the MDRD equation is not suitable   for acute renal failure patients and it is not   recommended for use with pregnant women.        GFR, -American   Date Value (H) 4.0 - 11.0 K/UL Final   07/26/2017 8.7 4.0 - 11.0 K/UL Final     WHITE BLOOD COUNT (L)   Date Value Ref Range Status   12/27/2014 7.4 4.0 - 11.0 K/UL Final   12/17/2014 8.3 4.0 - 11.0 K/UL Final   12/09/2014 6.6 4.0 - 11.0 K/UL Final   10/07/2014 6.0 4 40 mg Oral TID (Nitrates)   • Sacubitril-Valsartan  1 tablet Oral BID   • furosemide  40 mg Intravenous TID       Continuous Infusions:   • albuterol sulfate Stopped (09/30/18 1755)   • Ipratropium Bromide Stopped (09/30/18 1755)           Xr Chest Ap Por

## 2018-10-02 NOTE — PROGRESS NOTES
[facility] AND [facility]  [facility] Cardiology Progress Note    Baldemar Stanley Patient Status:  Observation    1935 MRN [id number]   Location [facility] 3W/SW Attending James Navarro MD   Hosp Day # 0 PCP Abbey Sanon MD       Impression  74 year ol she has never used smokeless tobacco. She reports that she does not drink alcohol or use drugs.     Objective:   Temp: 98.2 °F (36.8 °C)  Pulse: 72  Resp: 20  BP: 120/59    Intake/Output:     Intake/Output Summary (Last 24 hours) at 10/2/2018 0853  Last santos

## 2018-10-02 NOTE — PHYSICAL THERAPY NOTE
Chart reviewed, attempted to see pt for 2nd time today. Pt reporting increased weakness d/t inability to eat today d/t testing. Refused PT treatment today.  Educated pt to ambulate to bathroom and transfer to chair this evening with nursing staff to [facility]

## 2018-10-02 NOTE — PHYSICAL THERAPY NOTE
Chart reviewed, attempted to see pt. Pt currently off the floor visiting a family member. To re-attempt at a later time as appropriate, schedule permitting.

## 2018-10-03 PROCEDURE — 84156 ASSAY OF PROTEIN URINE: CPT | Performed by: INTERNAL MEDICINE

## 2018-10-03 PROCEDURE — 97116 GAIT TRAINING THERAPY: CPT

## 2018-10-03 PROCEDURE — 82043 UR ALBUMIN QUANTITATIVE: CPT | Performed by: INTERNAL MEDICINE

## 2018-10-03 PROCEDURE — 82570 ASSAY OF URINE CREATININE: CPT | Performed by: INTERNAL MEDICINE

## 2018-10-03 PROCEDURE — 85025 COMPLETE CBC W/AUTO DIFF WBC: CPT | Performed by: INTERNAL MEDICINE

## 2018-10-03 PROCEDURE — 82962 GLUCOSE BLOOD TEST: CPT

## 2018-10-03 PROCEDURE — 81001 URINALYSIS AUTO W/SCOPE: CPT | Performed by: INTERNAL MEDICINE

## 2018-10-03 PROCEDURE — 80048 BASIC METABOLIC PNL TOTAL CA: CPT | Performed by: INTERNAL MEDICINE

## 2018-10-03 RX ORDER — ONDANSETRON 2 MG/ML
4 INJECTION INTRAMUSCULAR; INTRAVENOUS EVERY 6 HOURS PRN
Status: DISCONTINUED | OUTPATIENT
Start: 2018-10-03 | End: 2018-10-08

## 2018-10-03 NOTE — PLAN OF CARE
Problem: Patient Centered Care  Goal: Patient preferences are identified and integrated in the patient's plan of care  Interventions:  - What would you like us to know as we care for you?  I need help ordering meals  - Provide timely, complete, and accurate Goal  Patient's Short Term Goal: resolve sob    Interventions:   - diuresis  -cardiac monitoring  - See additional Care Plan goals for specific interventions   Outcome: Progressing      Comments: Renal consulted d/t Cr rise. Contact ethan d/c'd per ID.    Mon

## 2018-10-03 NOTE — PHYSICAL THERAPY NOTE
Chart reviewed, attempted to see pt. Pt currently off the floor visiting family. Will re-attempt at a later time, schedule permitting.

## 2018-10-03 NOTE — PROGRESS NOTES
[facility]    Progress Note    Chad Dodd Patient Status:  Observation    1935 MRN [id number]   Location [facility] 3W/SW Attending Pepe Tellez MD   Hosp Day # 1 PCP Patti Tran MD       Subjective:    Chad Dodd i hours.     Recent Labs   Lab  10/02/18   0809  10/02/18   1250  10/02/18   1838  10/02/18   2116   PGLU  115*  129*  78  149*       Creatinine   Date Value Ref Range Status   10/03/2018 2.00 (H) 0.50 - 1.50 mg/dL Final   10/02/2018 1.56 (H) 0.50 - 1.50 mg/d Comment:     Chronic Kidney Disease: GFR <60 ml/min/1.73 m2  Kidney failure: GFR <15 ml/min/1.73 m2  The accuracy of the MDRD equation is not suitable   for acute renal failure patients and it is not   recommended for use with pregnant women.      04/08/201 6.7 4.0 - 11.0 K/UL Final   09/30/2018 6.3 4.0 - 11.0 K/UL Final   06/25/2018 6.8 4.0 - 11.0 K/UL Final   04/18/2018 10.3 4.0 - 11.0 K/UL Final   07/28/2017 11.5 (H) 4.0 - 11.0 K/UL Final   07/27/2017 11.4 (H) 4.0 - 11.0 K/UL Final     WHITE BLOOD COUNT (L Nightly   • atorvastatin  20 mg Oral Nightly   • SITagliptin Phosphate  25 mg Oral Daily   • hydrALAzine HCl  75 mg Oral TID (Nitrates)    And   • isosorbide dinitrate  40 mg Oral TID (Nitrates)   • Sacubitril-Valsartan  1 tablet Oral BID       Continuous

## 2018-10-03 NOTE — PHYSICAL THERAPY NOTE
PHYSICAL THERAPY TREATMENT NOTE - INPATIENT     Room Number: 330/330-A       Presenting Problem: p/w SOB x 2 weeks, BLE edema; dx acute on chronic HF    Problem List  Principal Problem:    Acute on chronic congestive heart failure, unspecified heart failur mechanics; Endurance; Energy conservation;Patient education;Gait training;Strengthening;Stair training;Balance training;Transfer training    SUBJECTIVE  \"I can't sleep here at night. I'm tired so I wanna go back and take a nap. \"    OBJECTIVE  Precautions: 200' x 1, 125' x 1, 75' x 1  Assistive Device: Rolling walker  Pattern: Shuffle(slow speed)  Stoop/Curb Assistance: Other (Comment)([facility])  Comment : pt ascended/descended 5 stairs with 1 rail and BUE support using side-stepping pattern at [facility]; required 2 se

## 2018-10-03 NOTE — PLAN OF CARE
Infection Control and Prevention have been following the patient, and there have been no visible bed bugs seen since presenting to the Emergency Department. All personal belongings have been bagged with no concerns noted.   Due to the fact there have been

## 2018-10-03 NOTE — PROGRESS NOTES
[facility] HOSP - [facility]  Cardiology Progress Note    Skye End Patient Status:  Inpatient    1935 MRN [id number]   Location [facility] 3W/SW Attending Marina Alas MD   Hosp Day # 1 PCP Eliu Renee MD       Assessment and Plan: kg)  04/17/18 1840 : 156 lb (70.8 kg)      Tele: NSR    Physical Exam:    General: Alert and oriented x 3. No apparent distress. No respiratory or constitutional distress. HEENT: Normocephalic,   Neck: No JVD,   Cardiac: Regular rate and rhythm.  S1, S2 no PRN   albuterol sulfate (VENTOLIN) (2.5 MG/3ML) 0.083% nebulizer solution 5 mg 5 mg Nebulization Continuous   Ipratropium Bromide (ATROVENT) 0.02 % nebulizer solution 0.5 mg 0.5 mg Nebulization Continuous       Marty Meza MD  10/3/2018  11:42 AM

## 2018-10-03 NOTE — PLAN OF CARE
Problem: Patient Centered Care  Goal: Patient preferences are identified and integrated in the patient's plan of care  Interventions:  - What would you like us to know as we care for you?  I need help ordering meals  - Provide timely, complete, and accurate Goal  Patient's Short Term Goal: resolve sob    Interventions:   - diuresis  -cardiac monitoring  - See additional Care Plan goals for specific interventions  Outcome: Progressing

## 2018-10-03 NOTE — CONSULTS
[facility]    Report of Consultation    Carli Bone Patient Status:  Inpatient    1935 MRN [id number]   Location [facility] 3W/SW Attending Alexia Weber MD   Hosp Day # 1 PCP Rex Artis MD     Date of Admission:   History  Social History    Tobacco Use      Smoking status: Former Smoker        Types: Cigarettes        Quit date: 1975        Years since quittin.2      Smokeless tobacco: Never Used    Alcohol use: No    Drug use: No      Current Medications: times daily. [DISCONTINUED] glyBURIDE 5 MG Oral Tab Take 5 mg by mouth daily with breakfast.   [DISCONTINUED] Isosorbide Mononitrate ER 30 MG Oral Tablet 24 Hr Take 30 mg by mouth daily.    [DISCONTINUED] lisinopril 20 MG Oral Tab Take 20 mg by mouth da kg)      Exam  Gen: No acute distress  Heent: NC AT, mucous memb clear, neck supple  Pulm: Lungs clear, normal respiratory effort  CV: Heart with regular rate and rhythm, no edema  Abd: Abdomen soft, nontender, nondistended, no organomegaly, bowel sounds p

## 2018-10-04 ENCOUNTER — APPOINTMENT (OUTPATIENT)
Dept: INTERVENTIONAL RADIOLOGY/VASCULAR | Facility: HOSPITAL | Age: 83
DRG: 286 | End: 2018-10-04
Attending: INTERNAL MEDICINE
Payer: MEDICARE

## 2018-10-04 PROCEDURE — 82962 GLUCOSE BLOOD TEST: CPT

## 2018-10-04 PROCEDURE — 85610 PROTHROMBIN TIME: CPT | Performed by: INTERNAL MEDICINE

## 2018-10-04 PROCEDURE — 84100 ASSAY OF PHOSPHORUS: CPT | Performed by: INTERNAL MEDICINE

## 2018-10-04 PROCEDURE — 85025 COMPLETE CBC W/AUTO DIFF WBC: CPT | Performed by: INTERNAL MEDICINE

## 2018-10-04 PROCEDURE — 97116 GAIT TRAINING THERAPY: CPT

## 2018-10-04 PROCEDURE — 80053 COMPREHEN METABOLIC PANEL: CPT | Performed by: INTERNAL MEDICINE

## 2018-10-04 PROCEDURE — A4216 STERILE WATER/SALINE, 10 ML: HCPCS | Performed by: INTERNAL MEDICINE

## 2018-10-04 PROCEDURE — 85730 THROMBOPLASTIN TIME PARTIAL: CPT | Performed by: INTERNAL MEDICINE

## 2018-10-04 PROCEDURE — 93451 RIGHT HEART CATH: CPT

## 2018-10-04 RX ORDER — ISOSORBIDE DINITRATE 20 MG/1
40 TABLET ORAL
Status: DISCONTINUED | OUTPATIENT
Start: 2018-10-04 | End: 2018-10-05

## 2018-10-04 RX ORDER — SODIUM CHLORIDE 9 MG/ML
INJECTION, SOLUTION INTRAVENOUS
Status: COMPLETED
Start: 2018-10-04 | End: 2018-10-04

## 2018-10-04 RX ORDER — ASPIRIN 81 MG/1
324 TABLET, CHEWABLE ORAL ONCE
Status: COMPLETED | OUTPATIENT
Start: 2018-10-04 | End: 2018-10-04

## 2018-10-04 RX ORDER — ASPIRIN 81 MG/1
TABLET, CHEWABLE ORAL
Status: DISPENSED
Start: 2018-10-04 | End: 2018-10-05

## 2018-10-04 RX ORDER — LIDOCAINE HYDROCHLORIDE 20 MG/ML
INJECTION, SOLUTION EPIDURAL; INFILTRATION; INTRACAUDAL; PERINEURAL
Status: COMPLETED
Start: 2018-10-04 | End: 2018-10-04

## 2018-10-04 RX ORDER — ISOSORBIDE DINITRATE 20 MG/1
40 TABLET ORAL
Status: DISCONTINUED | OUTPATIENT
Start: 2018-10-04 | End: 2018-10-04

## 2018-10-04 RX ORDER — HYDRALAZINE HYDROCHLORIDE 100 MG/1
100 TABLET, FILM COATED ORAL
Status: DISCONTINUED | OUTPATIENT
Start: 2018-10-04 | End: 2018-10-04

## 2018-10-04 RX ORDER — SODIUM CHLORIDE 9 MG/ML
INJECTION, SOLUTION INTRAVENOUS CONTINUOUS
Status: DISCONTINUED | OUTPATIENT
Start: 2018-10-04 | End: 2018-10-08

## 2018-10-04 RX ORDER — CHLORHEXIDINE GLUCONATE 4 G/100ML
30 SOLUTION TOPICAL
Status: ACTIVE | OUTPATIENT
Start: 2018-10-05 | End: 2018-10-05

## 2018-10-04 NOTE — BRIEF PROCEDURE NOTE
Brief Cardiac Cath Note  Filiberto Vivas Patient Status:  Inpatient    1935 MRN [id number]   [facility] Attending Shagufta Maldonado MD   Hosp Day # 2 PCP Leta Ramirez MD       Cardiologist: Veena Krause MD  Pr

## 2018-10-04 NOTE — PROGRESS NOTES
[facility]    Progress Note    Pippa Ospina Patient Status:  Observation    1935 MRN [id number]   Location [facility] 3W/SW Attending Peggy Garrido MD   Hosp Day # 2 PCP Nolan Echavarria MD       Subjective:    Pippa Ospina i last 168 hours.     Recent Labs   Lab  10/03/18   0751  10/03/18   1114  10/03/18   1649  10/03/18   2039   PGLU  104*  119*  125*  158*       Creatinine   Date Value Ref Range Status   10/03/2018 2.00 (H) 0.50 - 1.50 mg/dL Final   10/02/2018 1.56 (H) 0.50 Final     Comment:     Chronic Kidney Disease: GFR <60 ml/min/1.73 m2  Kidney failure: GFR <15 ml/min/1.73 m2  The accuracy of the MDRD equation is not suitable   for acute renal failure patients and it is not   recommended for use with pregnant women. 10/03/2018 6.7 4.0 - 11.0 K/UL Final   09/30/2018 6.3 4.0 - 11.0 K/UL Final   06/25/2018 6.8 4.0 - 11.0 K/UL Final   04/18/2018 10.3 4.0 - 11.0 K/UL Final   07/28/2017 11.5 (H) 4.0 - 11.0 K/UL Final   07/27/2017 11.4 (H) 4.0 - 11.0 K/UL Final     WHITE B Both Eyes Nightly   • atorvastatin  20 mg Oral Nightly   • SITagliptin Phosphate  25 mg Oral Daily   • hydrALAzine HCl  75 mg Oral TID (Nitrates)    And   • isosorbide dinitrate  40 mg Oral TID (Nitrates)       Continuous Infusions:   • albuterol sulfate S

## 2018-10-04 NOTE — PHYSICAL THERAPY NOTE
PHYSICAL THERAPY TREATMENT NOTE - INPATIENT     Room Number: 330/330-A       Presenting Problem: p/w SOB x 2 weeks, BLE edema; dx acute on chronic HF    Problem List  Principal Problem:    Acute on chronic congestive heart failure, unspecified heart failur care/supervision;Home with home health PT     PLAN  PT Treatment Plan: Bed mobility; Body mechanics; Endurance; Energy conservation;Patient education;Gait training;Strengthening;Stair training;Transfer training;Balance training    SUBJECTIVE  \"Oh cynthia, I can Assessment   Gait Assistance: Supervision  Distance (ft): 250' x 1, 150' x 1, 75' x 2  Assistive Device: Rolling walker  Pattern: Shuffle(slow speed, good balance)  Stoop/Curb Assistance: Other (Comment)(C[facility] to SBA)  Comment : pt ascended/descended 5 stair

## 2018-10-04 NOTE — PROGRESS NOTES
[facility]    Progress Note    Mckenna Cuevas Patient Status:  Inpatient    1935 MRN [id number]   Location [facility] 3W/SW Attending Soniya Putnam MD   Hosp Day # 2 PCP Reyna Bhardwaj MD       Subjective:    Mckenna Cuevas i 0.03 09/30/2018    B12 477 06/27/2018                   Marlene Lainez MD  10/4/2018

## 2018-10-04 NOTE — PROGRESS NOTES
Maurice Fung  [id number]  10/4/2018    Post procedure/ recovery hand-off report given to [email or web address].IO QUIN JUAREZ. Patient's vital signs stable, access site dry and intact, no signs and symptoms of bleeding/ hematoma.     Kirstie Singh RN

## 2018-10-04 NOTE — PROGRESS NOTES
Called to get report from floor RN, Yanet Middleton. Per RN, patient had eaten full breakfast today. Relayed message with Jaimee Bermudez, RT - paged Dr. Bita Barrow to notify. Notified Dr. Bita Barrow and ok to proceed with procedure today.       Yanet Middleton, RN notified this RN reg

## 2018-10-04 NOTE — PROGRESS NOTES
[facility] HOSP - [facility]  Cardiology Progress Note    Halie Soto Patient Status:  Inpatient    1935 MRN [id number]   Location [facility] 3W/SW Attending Alexis Reynolds MD   Hosp Day # 2 PCP Nguyễn Dillon MD       Assessment and Plan: affect    Laboratory/Data:    Labs:         Recent Labs   Lab  09/30/18   1709  10/03/18   0643  10/04/18   0731   WBC  6.3  6.7  6.6   HGB  11.0*  12.6  12.6   MCV  87.2  86.3  86.6   PLT  209  224  204       Recent Labs   Lab  09/30/18   1709  10/02/18 Conclusions  1. Left ventricle: The cavity size was mildly dilated. Wall     thickness was increased in a pattern of mild LVH. Systolic     function was moderately to severely reduced. The estimated     ejection fraction was 30-35%. Diffuse hypokinesis.  Fe

## 2018-10-05 PROCEDURE — 82962 GLUCOSE BLOOD TEST: CPT

## 2018-10-05 PROCEDURE — 80069 RENAL FUNCTION PANEL: CPT | Performed by: INTERNAL MEDICINE

## 2018-10-05 RX ORDER — HYDRALAZINE HYDROCHLORIDE 100 MG/1
100 TABLET, FILM COATED ORAL
Status: DISCONTINUED | OUTPATIENT
Start: 2018-10-05 | End: 2018-10-08

## 2018-10-05 RX ORDER — ISOSORBIDE DINITRATE 20 MG/1
40 TABLET ORAL
Status: DISCONTINUED | OUTPATIENT
Start: 2018-10-05 | End: 2018-10-08

## 2018-10-05 RX ORDER — METOPROLOL SUCCINATE 100 MG/1
100 TABLET, EXTENDED RELEASE ORAL
Status: DISCONTINUED | OUTPATIENT
Start: 2018-10-05 | End: 2018-10-08

## 2018-10-05 NOTE — PROGRESS NOTES
[facility]    Progress Note    Dereck Irwin Patient Status:  Observation    1935 MRN [id number]   Location [facility] 3W/SW Attending Jackie Rashid MD   Hosp Day # 3 PCP Kuldip Barros MD       Subjective:    Dereck Irwin i ALT   --    --   6*   BILT   --    --   0.7   TP   --    --   6.0       No results for input(s): DANNA CASTILLO in the last 168 hours.     Recent Labs   Lab  09/30/18   1709   TROP  0.03       Recent Labs   Lab  10/04/18   1119   INR  1.1       Recent Labs   La ml/min/1.73 m2  Kidney failure: GFR <15 ml/min/1.73 m2  The accuracy of the MDRD equation is not suitable  for acute renal failure patients and it is not  recommended for use with pregnant women.      06/10/2014 44 (L) >60 Final     Comment:     Chronic Kid 10/04/2018 15 (L) >=60 Final   10/03/2018 23 (L) >=60 Final   10/02/2018 31 (L) >=60 Final   09/30/2018 42 (L) >=60 Final   06/27/2018 35 (L) >=60 Final   06/25/2018 26 (L) >=60 Final     WBC   Date Value Ref Range Status   10/04/2018 6.6 4.0 - 11.0 K/UL Scheduled Meds:   • Chlorhexidine Gluconate  30 mL Topical On Call   • hydrALAzine HCl  75 mg Oral TID (Nitrates)    And   • isosorbide dinitrate  40 mg Oral TID (Nitrates)   • Insulin Aspart Pen  1-5 Units Subcutaneous TID CC   • carvedilol  25 mg O

## 2018-10-05 NOTE — PROGRESS NOTES
[facility]    Progress Note    Emmanuel King Patient Status:  Inpatient    1935 MRN [id number]   Location [facility] 3W/SW Attending Abdias Conrad MD   Hosp Day # 3 PCP Derek Dietrich MD       Subjective:    Emmanuel King i (H) 09/30/2013    MG 2.4 08/03/2017    PHOS 5.3 (H) 10/05/2018    TROP 0.03 09/30/2018    B12 477 06/27/2018                   Jas Reeder MD  10/5/2018

## 2018-10-05 NOTE — PROGRESS NOTES
[facility]  Cardiology Progress Note    Dereck Irwin Patient Status:  Inpatient    1935 MRN [id number]   Location [facility] 3W/SW Attending Jackie Rashid MD   Hosp Day # 3 PCP Kuldip Barros MD       Assessment and Plan: NSR    Physical Exam:  /68 (BP Location: Left arm)   Pulse 79   Temp 98.2 °F (36.8 °C) (Oral)   Resp 16   Ht 5' 1\" (1.549 m)   Wt 140 lb 8 oz (63.7 kg)   SpO2 97%   BMI 26.55 kg/m²       Gen: a,ox3, NAD; comfortable laying flat  HEENT: MMM  Neck: no glipiZIDE (GLUCOTROL) tab 10 mg 10 mg Oral BID AC   latanoprost (XALATAN) 0.005 % ophthalmic solution 1 drop 1 drop Both Eyes Nightly   atorvastatin (LIPITOR) tab 20 mg 20 mg Oral Nightly   SITagliptin Phosphate (JANUVIA) tab 25 mg 25 mg Oral Daily   Sod

## 2018-10-06 ENCOUNTER — APPOINTMENT (OUTPATIENT)
Dept: GENERAL RADIOLOGY | Facility: HOSPITAL | Age: 83
DRG: 286 | End: 2018-10-06
Attending: INTERNAL MEDICINE
Payer: MEDICARE

## 2018-10-06 PROCEDURE — 80069 RENAL FUNCTION PANEL: CPT | Performed by: INTERNAL MEDICINE

## 2018-10-06 PROCEDURE — 82962 GLUCOSE BLOOD TEST: CPT

## 2018-10-06 PROCEDURE — 73610 X-RAY EXAM OF ANKLE: CPT | Performed by: INTERNAL MEDICINE

## 2018-10-06 PROCEDURE — 97116 GAIT TRAINING THERAPY: CPT

## 2018-10-06 PROCEDURE — 73630 X-RAY EXAM OF FOOT: CPT | Performed by: INTERNAL MEDICINE

## 2018-10-06 PROCEDURE — 97530 THERAPEUTIC ACTIVITIES: CPT

## 2018-10-06 NOTE — PHYSICAL THERAPY NOTE
PHYSICAL THERAPY TREATMENT NOTE - INPATIENT     Room Number: 330/330-A       Presenting Problem: p/w SOB x 2 weeks, BLE edema; dx acute on chronic HF    Problem List  Principal Problem:    Acute on chronic congestive heart failure, unspecified heart failur patient currently have. ..  -   Turning over in bed (including adjusting bedclothes, sheets and blankets)?: None   -   Sitting down on and standing up from a chair with arms (e.g., wheelchair, bedside commode, etc.): A Little   -   Moving from lying on back rolling walker at supervision     Goal #4 Patient will negotiate 5 stairs/one curb w/ assistive device and supervision   Goal #4   Current Status Pt ascends/descends 5 stairs with 1 rail and BUE support at CGA to SBA   Goal #5 Patient to demonstrate indepe

## 2018-10-06 NOTE — PROGRESS NOTES
[facility]    Progress Note    Robert Coleman Patient Status:  Inpatient    1935 MRN [id number]   Location [facility] 3W/SW Attending Hans Ruiz MD   Hosp Day # 4 PCP Christiano Hu MD       Subjective:    Robert Coleman i 10/04/2018    TSH 3.44 06/27/2018    LIP 21 (L) 07/26/2017    ESRML 101 (H) 04/18/2018    CRP 17.0 (H) 04/19/2018     (H) 09/30/2013    MG 2.4 08/03/2017    PHOS 4.2 10/06/2018    TROP 0.03 09/30/2018    B12 477 06/27/2018                   Codie Weaver

## 2018-10-06 NOTE — PROGRESS NOTES
[facility] Card[facility] Progress Note      Maurice Fung Patient Status:  Inpatient    1935 MRN [id number]   Location [facility] 3W/SW Attending Solomon Schwartz, [facility] Se Day # 4 PCP Noel Uribe MD       A lesions    Scheduled Meds:   • Metoprolol Succinate ER  100 mg Oral Daily Beta Blocker   • hydrALAzine HCl  100 mg Oral TID (Nitrates)    And   • isosorbide dinitrate  40 mg Oral TID (Nitrates)   • Insulin Aspart Pen  1-5 Units Subcutaneous TID CC   • Clop

## 2018-10-06 NOTE — PLAN OF CARE
Problem: Patient Centered Care  Goal: Patient preferences are identified and integrated in the patient's plan of care  Interventions:  - What would you like us to know as we care for you?  I need help ordering meals  - Provide timely, complete, and accurate Plan goals for specific interventions   Outcome: Progressing    Goal: Patient/Family Short Term Goal  Patient's Short Term Goal: resolve sob    Interventions:   - diuresis  -cardiac monitoring  - See additional Care Plan goals for specific interventions

## 2018-10-06 NOTE — PROGRESS NOTES
[facility]    Progress Note    Kwasinava Won Patient Status:  Observation    1935 MRN [id number]   Location [facility] 3W/SW Attending Alexia Weber MD   Hosp Day # 4 PCP Rex Artis MD       Subjective:    Kwasinava Upton i ALKPHO  80   --    --    AST  17   --    --    ALT  6*   --    --    BILT  0.7   --    --    TP  6.0   --    --        No results for input(s): DANNA CASTILLO in the last 168 hours.     Recent Labs   Lab  09/30/18   1709   TROP  0.03       Recent Labs   Lab  10 Comment:     Chronic Kidney Disease: GFR <60 ml/min/1.73 m2  Kidney failure: GFR <15 ml/min/1.73 m2  The accuracy of the MDRD equation is not suitable  for acute renal failure patients and it is not  recommended for use with pregnant women.      06/10/2 Non-   Date Value Ref Range Status   10/06/2018 21 (L) >=60 Final   10/05/2018 15 (L) >=60 Final   10/04/2018 15 (L) >=60 Final   10/03/2018 23 (L) >=60 Final   10/02/2018 31 (L) >=60 Final   09/30/2018 42 (L) >=60 Final     WBC   Date Valu K/UL Final   04/08/2014 198 140 - 400 K/UL Final       Scheduled Meds:   • Metoprolol Succinate ER  100 mg Oral Daily Beta Blocker   • hydrALAzine HCl  100 mg Oral TID (Nitrates)    And   • isosorbide dinitrate  40 mg Oral TID (Nitrates)   • Insulin Aspart

## 2018-10-07 ENCOUNTER — APPOINTMENT (OUTPATIENT)
Dept: MRI IMAGING | Facility: HOSPITAL | Age: 83
DRG: 286 | End: 2018-10-07
Attending: ORTHOPAEDIC SURGERY
Payer: MEDICARE

## 2018-10-07 PROCEDURE — 73718 MRI LOWER EXTREMITY W/O DYE: CPT | Performed by: ORTHOPAEDIC SURGERY

## 2018-10-07 PROCEDURE — 80069 RENAL FUNCTION PANEL: CPT | Performed by: INTERNAL MEDICINE

## 2018-10-07 PROCEDURE — A4216 STERILE WATER/SALINE, 10 ML: HCPCS | Performed by: INTERNAL MEDICINE

## 2018-10-07 PROCEDURE — 73721 MRI JNT OF LWR EXTRE W/O DYE: CPT | Performed by: ORTHOPAEDIC SURGERY

## 2018-10-07 PROCEDURE — 82962 GLUCOSE BLOOD TEST: CPT

## 2018-10-07 RX ORDER — SPIRONOLACTONE 25 MG/1
25 TABLET ORAL DAILY
Status: DISCONTINUED | OUTPATIENT
Start: 2018-10-07 | End: 2018-10-08

## 2018-10-07 RX ORDER — FUROSEMIDE 20 MG/1
20 TABLET ORAL DAILY
Status: DISCONTINUED | OUTPATIENT
Start: 2018-10-07 | End: 2018-10-08

## 2018-10-07 NOTE — PROGRESS NOTES
[facility] HOSP - [facility]    Cardiology Progress Note    Whit Ill Patient Status:  Inpatient    1935 MRN [id number]   Location [facility] 3W/SW Attending Wendy Pacheco MD   Hosp Day # 5 PCP Gisselle Garcia MD         Assessment and Pl Eyes Nightly   • atorvastatin  20 mg Oral Nightly   • SITagliptin Phosphate  25 mg Oral Daily         Results:     Lab Results   Component Value Date    WBC 6.6 10/04/2018    HGB 12.6 10/04/2018    HCT 39.2 10/04/2018     10/04/2018    CREATSERUM 1.

## 2018-10-07 NOTE — PLAN OF CARE
Problem: Patient Centered Care  Goal: Patient preferences are identified and integrated in the patient's plan of care  Interventions:  - What would you like us to know as we care for you?  I need help ordering meals  - Provide timely, complete, and accurate Met: 10/07/18    Goal: Patient/Family Short Term Goal  Patient's Short Term Goal: resolve sob    Interventions:   - diuresis  -cardiac monitoring  - See additional Care Plan goals for specific interventions   Outcome: Adequate for Discharge

## 2018-10-07 NOTE — PROGRESS NOTES
ORTHO SURG: EVAL. DICTATED. EXAM AND RADIOGRAPHIC IMAGING OF LEFT ANKLE AND FOOT ARE NON-DIAGNOSTIC. NON-CONTRAST MRI OF LEFT ANKLE & FOOT ORDERED FOR THIS PM ~1400 HOURS.

## 2018-10-07 NOTE — CONSULTS
[facility]    PATIENT'S NAME: Kike MCMILLAN   ATTENDING PHYSICIAN: Stevie Greenberg MD   CONSULTING PHYSICIAN: Stefanie Prasad MD   PATIENT ACCOUNT#:   [id number]    LOCATION:  [address] [place] [address] Drive #:   [id number]       DATE OF BIRTH: Aldactone 25 mg daily, Atrovent nebulizer p.r.n., Ventolin inhaler p.r.n., Zofran 4 mg IV q.6 h. p.r.n., acetaminophen 500 mg q.6 h. p.r.n. PHYSICAL EXAMINATION:    GENERAL:  Alert, adult female resting in bed. She is in no acute distress.   She is orie MD  d: 10/07/2018 10:48:14  t: 10/07/2018 10:59:27  [facility] [id number]  [facility]/

## 2018-10-07 NOTE — PROGRESS NOTES
[facility]    Progress Note    Mckenna Cuevas Patient Status:  Observation    1935 MRN [id number]   Location [facility] 3W/SW Attending Wynette Felty, MD   Hosp Day # 5 PCP Reyna Bhardwaj MD       Subjective:    Mckenna Cuevas i 138  138   K  4.7  4.7  4.7  4.5   CL  102  100  103  105   CO2  27  29  29  27   ALKPHO  80   --    --    --    AST  17   --    --    --    ALT  6*   --    --    --    BILT  0.7   --    --    --    TP  6.0   --    --    --        No results for input(s): acute renal failure patients and it is not  recommended for use with pregnant women.      09/23/2014 47 (L) >60 Final     Comment:     Chronic Kidney Disease: GFR <60 ml/min/1.73 m2  Kidney failure: GFR <15 ml/min/1.73 m2  The accuracy of the MDRD equation (L) >60 Final   09/23/2014 39 (L) >60 Final   06/10/2014 36 (L) >60 Final   04/08/2014 30 (L) >60 Final     GFR, Non-   Date Value Ref Range Status   10/07/2018 26 (L) >=60 Final   10/06/2018 21 (L) >=60 Final   10/05/2018 15 (L) >=60 Final Final   12/09/2014 171 140 - 400 K/UL Final   10/07/2014 188 140 - 400 K/UL Final   06/10/2014 175 140 - 400 K/UL Final   04/08/2014 198 140 - 400 K/UL Final       Scheduled Meds:   • Metoprolol Succinate ER  100 mg Oral Daily Beta Blocker   • hydrALAzine

## 2018-10-08 VITALS
SYSTOLIC BLOOD PRESSURE: 130 MMHG | BODY MASS INDEX: 26.62 KG/M2 | OXYGEN SATURATION: 98 % | DIASTOLIC BLOOD PRESSURE: 64 MMHG | HEIGHT: 61 IN | WEIGHT: 141 LBS | TEMPERATURE: 98 F | RESPIRATION RATE: 18 BRPM | HEART RATE: 71 BPM

## 2018-10-08 PROCEDURE — 80048 BASIC METABOLIC PNL TOTAL CA: CPT | Performed by: INTERNAL MEDICINE

## 2018-10-08 PROCEDURE — 82962 GLUCOSE BLOOD TEST: CPT

## 2018-10-08 RX ORDER — SPIRONOLACTONE 25 MG/1
25 TABLET ORAL DAILY
Qty: 30 TABLET | Refills: 0 | Status: SHIPPED | OUTPATIENT
Start: 2018-10-09 | End: 2018-11-08

## 2018-10-08 RX ORDER — ISOSORBIDE DINITRATE 40 MG/1
40 TABLET ORAL
Qty: 90 TABLET | Refills: 0 | Status: SHIPPED | OUTPATIENT
Start: 2018-10-08 | End: 2018-11-07

## 2018-10-08 RX ORDER — HYDRALAZINE HYDROCHLORIDE 100 MG/1
100 TABLET, FILM COATED ORAL
Qty: 90 TABLET | Refills: 0 | Status: SHIPPED | OUTPATIENT
Start: 2018-10-08 | End: 2018-11-07

## 2018-10-08 RX ORDER — METOPROLOL SUCCINATE 100 MG/1
100 TABLET, EXTENDED RELEASE ORAL
Qty: 30 TABLET | Refills: 0 | Status: SHIPPED | OUTPATIENT
Start: 2018-10-09 | End: 2018-11-08

## 2018-10-08 NOTE — PROGRESS NOTES
[facility]    Progress Note    Martha Quintero Patient Status:  Observation    1935 MRN [id number]   Location [facility] 3W/SW Attending Ema Lambert MD   Hosp Day # 6 PCP Maricruz Martinez MD       Subjective:    Martha Quintero i 9. 0   ALB  3.0*  3.0*  3.0*  3.0*   NA  138  136  138  138   K  4.7  4.7  4.7  4.5   CL  102  100  103  105   CO2  27  29  29  27   ALKPHO  80   --    --    --    AST  17   --    --    --    ALT  6*   --    --    --    BILT  0.7   --    --    --    TP  6. m2  The accuracy of the MDRD equation is not suitable  for acute renal failure patients and it is not  recommended for use with pregnant women.      09/23/2014 47 (L) >60 Final     Comment:     Chronic Kidney Disease: GFR <60 ml/min/1.73 m2  Kidney failure: >60 Final   12/09/2014 20 (L) >60 Final   10/07/2014 38 (L) >60 Final   09/23/2014 39 (L) >60 Final   06/10/2014 36 (L) >60 Final   04/08/2014 30 (L) >60 Final     GFR, Non-   Date Value Ref Range Status   10/07/2018 26 (L) >=60 Final   10/ 175 140 - 400 K/UL Final   12/17/2014 165 140 - 400 K/UL Final   12/09/2014 171 140 - 400 K/UL Final   10/07/2014 188 140 - 400 K/UL Final   06/10/2014 175 140 - 400 K/UL Final   04/08/2014 198 140 - 400 K/UL Final       Scheduled Meds:   • furosemide  20

## 2018-10-08 NOTE — TRANSITION NOTE
Primary Cardiologist: Dr. Flakita Robbins  Subjective:   Subjective:  82year old female who presented with SOB and decompensated HF.     She is ambulating halls and feeling well.        Constitutional: Negative. Respiratory: Negative.     Cardiovascular: Nega up after RHC showing mostly compensated HD as noted below.        A/C systolic HF 2/2 to ICM with MR and pHTN,  EF 30-35% on echo 10/2/18, mildly hypervolemic  - continue Toprol 100 daily (converted from coreg for hypotn), No ACEi/ARB/entresto, continue on 10/06/2018 at 18:54     Approved by (CST): George Warren MD on 10/06/2018 at 18:56           Mri Foot+ankle, Left  (cpt=73718/03325)     Result Date: 10/8/2018  CONCLUSION:   Erosions seen at the tarsometatarsal joints without corresponding ma (Eulogio) 3.2/2.0   CO/CI (TD) 3.1/1. 9     TPG 12 mmHg  PVR 3.9 Wood units     SVR 1800 Dynes-sec-cm^-5     Sats:  PA 58%  SVC 56.6%  AO 94%           ADY Mcdonald  [facility] Cardiology  10/08/18

## 2018-10-08 NOTE — PROGRESS NOTES
[facility]    Cardiology Progress Note    Kirk Curran Patient Status:  Inpatient    1935 MRN [id number]   Location [facility] 3W/SW Attending Madison Ferguson MD   Hosp Day # 6 PCP Matias Gonzalez MD     Primary Cardiologist: worsening volume retention. She had not tolerated an ACEi prior d/t elevated Cr. Trial of entresto with worsening renal function. This was stopped. Hydralazine and ISDN titrated up after RHC showing mostly compensated HD as noted below.       A/C systol (puj=00650)    Result Date: 10/6/2018  CONCLUSION:  1. No acute findings. 2. Mild osteoarthritis.      Dictated by (CST): Ciro Crane MD on 10/06/2018 at 18:54     Approved by (CST): Hong Warren MD on 10/06/2018 at 18:56          Mri the prior report. Pulmonary artery pressure improved.        RHC 10/4/18  RA 6 mmHg  RV 49/2/6 mmHg  PA 49/16/27 mmHg  PCW 15 mmHg     CO/CI (Eulogio) 3.2/2.0   CO/CI (TD) 3.1/1. 9     TPG 12 mmHg  PVR 3.9 Wood units     SVR 1800 Dynes-sec-cm^-5     Sats:  PA

## 2018-10-08 NOTE — PROGRESS NOTES
Core Measure Update: EF 35%  Did not tolerate ACE or Entresto secondary to rise in Cr. Not on spironolactone secondary to renal function. On long acting beta blocker.

## 2018-10-08 NOTE — PLAN OF CARE
Ok to discharge patient home. Discharge instructions explained to patient and granddaughter. Prescription given. Tele and IV discontinued. Patient sent home with family.

## 2018-10-08 NOTE — PROGRESS NOTES
ORTHO SURG: I HAVE REVIEWED THE MRI OF THE LEFT ANKLE/FOOT. NO OBVIOUS ABNORMALITIES ARE NOTED ON THAT REVIEW. NEED FINAL RADIOLOGIST REPORT WHICH IS STILL PENDING.   IF THAT IS NEGATIVE OR NON-DIAGNOSTIC FOR A CONDITION REQUIRING CLOSED OR OPEN INTERVENTI

## 2018-10-09 ENCOUNTER — TELEPHONE (OUTPATIENT)
Dept: CARDIOLOGY UNIT | Facility: HOSPITAL | Age: 83
End: 2018-10-09

## 2018-10-12 ENCOUNTER — LAB REQUISITION (OUTPATIENT)
Dept: LAB | Facility: HOSPITAL | Age: 83
End: 2018-10-12
Payer: MEDICARE

## 2018-10-12 DIAGNOSIS — D64.9 ANEMIA: ICD-10-CM

## 2018-10-12 DIAGNOSIS — N18.30 CHRONIC KIDNEY DISEASE, STAGE III (MODERATE) (HCC): ICD-10-CM

## 2018-10-12 PROCEDURE — 85025 COMPLETE CBC W/AUTO DIFF WBC: CPT | Performed by: INTERNAL MEDICINE

## 2018-10-12 PROCEDURE — 80048 BASIC METABOLIC PNL TOTAL CA: CPT | Performed by: INTERNAL MEDICINE

## 2018-10-26 NOTE — DISCHARGE SUMMARY
[facility]    PATIENT'S NAME: Thi MCMILLAN   ATTENDING PHYSICIAN: Stevie Linn MD   PATIENT ACCOUNT#:   [id number]    LOCATION:  [address] [place] [address] #:   [id number]       YOB: 1935  ADMISSION DATE:       09/30/201 because of left foot and ankle pain. An MRI was performed of this area. Since the patient was stable, she was then discharged home. DISPOSITION:  Good. ARRANGEMENT FOR FUTURE CARE:  The patient to be followed by specialists as directed.   Will see D

## 2018-12-17 ENCOUNTER — HOSPITAL ENCOUNTER (EMERGENCY)
Facility: HOSPITAL | Age: 83
Discharge: HOME | End: 2018-12-17
Attending: EMERGENCY MEDICINE
Payer: MEDICARE

## 2018-12-17 VITALS
OXYGEN SATURATION: 97 % | SYSTOLIC BLOOD PRESSURE: 122 MMHG | WEIGHT: 145 LBS | HEIGHT: 61 IN | DIASTOLIC BLOOD PRESSURE: 72 MMHG | HEART RATE: 102 BPM | BODY MASS INDEX: 27.38 KG/M2 | RESPIRATION RATE: 18 BRPM | TEMPERATURE: 98 F

## 2018-12-17 DIAGNOSIS — R19.7 DIARRHEA, UNSPECIFIED TYPE: Primary | ICD-10-CM

## 2018-12-17 DIAGNOSIS — R53.83 FATIGUE, UNSPECIFIED TYPE: ICD-10-CM

## 2018-12-17 PROCEDURE — 81001 URINALYSIS AUTO W/SCOPE: CPT | Performed by: EMERGENCY MEDICINE

## 2018-12-17 PROCEDURE — 96374 THER/PROPH/DIAG INJ IV PUSH: CPT

## 2018-12-17 PROCEDURE — 99284 EMERGENCY DEPT VISIT MOD MDM: CPT

## 2018-12-17 PROCEDURE — 96361 HYDRATE IV INFUSION ADD-ON: CPT

## 2018-12-17 PROCEDURE — 80048 BASIC METABOLIC PNL TOTAL CA: CPT | Performed by: EMERGENCY MEDICINE

## 2018-12-17 PROCEDURE — 85025 COMPLETE CBC W/AUTO DIFF WBC: CPT | Performed by: EMERGENCY MEDICINE

## 2018-12-17 PROCEDURE — 87086 URINE CULTURE/COLONY COUNT: CPT | Performed by: EMERGENCY MEDICINE

## 2018-12-17 RX ORDER — ONDANSETRON 4 MG/1
4 TABLET, ORALLY DISINTEGRATING ORAL EVERY 4 HOURS PRN
Qty: 15 TABLET | Refills: 0 | Status: SHIPPED | OUTPATIENT
Start: 2018-12-17 | End: 2019-05-14

## 2018-12-17 RX ORDER — ONDANSETRON 2 MG/ML
4 INJECTION INTRAMUSCULAR; INTRAVENOUS ONCE
Status: COMPLETED | OUTPATIENT
Start: 2018-12-17 | End: 2018-12-17

## 2018-12-18 NOTE — ED INITIAL ASSESSMENT (HPI)
Pt came in for diarrhea, fatigue, and generalized weakness. RR even and nonlabored, afebrile. Poor appetite.

## 2018-12-18 NOTE — ED NOTES
Pt alert and interactive. Complains of nausea, and no appetite since Saturday. Tenderness to upper abd. No emesis noted. Unable to tolerate meals. Generalized weakness now noted. Denies sob, cp, numbness or tingling.

## 2018-12-18 NOTE — ED PROVIDER NOTES
Patient Seen in: [facility] Emergency Department    History   Patient presents with:  Nausea/Vomiting/Diarrhea (gastrointestinal)      HPI    Patient presents to the ED complaining of several episodes of diarrhea this morning.   No further diarrhea quittin.4      Smokeless tobacco: Never Used    Substance and Sexual Activity      Alcohol use: No      Drug use: No    Other Topics      Concerns:        Caffeine Concern: Yes          per ng 1cup coff      ROS  Pertinent Positives: Diarrhea, fatigue Moderate (*)     WBC Urine 6 (*)     Bacteria Urine Few (*)     All other components within normal limits   CBC W/ DIFFERENTIAL - Abnormal; Notable for the following components:    HGB 11.8 (*)     All other components within normal limits   [facility] CKD (chronic kidney disease) stage 3, GFR 30-59 ml/min ([facility] Chronic kidney disease, stage III (moderate) ([facility] Right sided abdominal pain; Acute gouty arthritis;  Inability to ambulate due to multiple joints; Acute arthritis; Confusion; Confusion

## 2019-05-14 ENCOUNTER — APPOINTMENT (OUTPATIENT)
Dept: GENERAL RADIOLOGY | Facility: HOSPITAL | Age: 84
DRG: 291 | End: 2019-05-14
Attending: PHYSICIAN ASSISTANT
Payer: MEDICARE

## 2019-05-14 ENCOUNTER — HOSPITAL ENCOUNTER (INPATIENT)
Facility: HOSPITAL | Age: 84
LOS: 4 days | Discharge: HOME HEALTH | DRG: 291 | End: 2019-05-18
Attending: PHYSICIAN ASSISTANT | Admitting: INTERNAL MEDICINE
Payer: MEDICARE

## 2019-05-14 ENCOUNTER — APPOINTMENT (OUTPATIENT)
Dept: GENERAL RADIOLOGY | Facility: HOSPITAL | Age: 84
DRG: 291 | End: 2019-05-14
Attending: INTERNAL MEDICINE
Payer: MEDICARE

## 2019-05-14 DIAGNOSIS — M54.50 BACK PAIN, LUMBOSACRAL: ICD-10-CM

## 2019-05-14 DIAGNOSIS — I50.9 ACUTE ON CHRONIC CONGESTIVE HEART FAILURE, UNSPECIFIED HEART FAILURE TYPE (HCC): Primary | ICD-10-CM

## 2019-05-14 PROCEDURE — 72100 X-RAY EXAM L-S SPINE 2/3 VWS: CPT | Performed by: INTERNAL MEDICINE

## 2019-05-14 PROCEDURE — 71046 X-RAY EXAM CHEST 2 VIEWS: CPT | Performed by: PHYSICIAN ASSISTANT

## 2019-05-14 PROCEDURE — 72110 X-RAY EXAM L-2 SPINE 4/>VWS: CPT | Performed by: INTERNAL MEDICINE

## 2019-05-14 RX ORDER — ONDANSETRON 2 MG/ML
4 INJECTION INTRAMUSCULAR; INTRAVENOUS EVERY 6 HOURS PRN
Status: DISCONTINUED | OUTPATIENT
Start: 2019-05-14 | End: 2019-05-18

## 2019-05-14 RX ORDER — ATORVASTATIN CALCIUM 20 MG/1
20 TABLET, FILM COATED ORAL NIGHTLY
Status: DISCONTINUED | OUTPATIENT
Start: 2019-05-14 | End: 2019-05-18

## 2019-05-14 RX ORDER — ISOSORBIDE DINITRATE 20 MG/1
20 TABLET ORAL 2 TIMES DAILY
Status: ON HOLD | COMMUNITY
End: 2019-10-29

## 2019-05-14 RX ORDER — LATANOPROST 50 UG/ML
1 SOLUTION/ DROPS OPHTHALMIC 2 TIMES DAILY
Status: DISCONTINUED | OUTPATIENT
Start: 2019-05-14 | End: 2019-05-18

## 2019-05-14 RX ORDER — HYDRALAZINE HYDROCHLORIDE 100 MG/1
100 TABLET, FILM COATED ORAL 3 TIMES DAILY
Status: ON HOLD | COMMUNITY
End: 2019-10-29

## 2019-05-14 RX ORDER — HYDRALAZINE HYDROCHLORIDE 100 MG/1
100 TABLET, FILM COATED ORAL 3 TIMES DAILY
Status: DISCONTINUED | OUTPATIENT
Start: 2019-05-14 | End: 2019-05-18

## 2019-05-14 RX ORDER — PRAVASTATIN SODIUM 20 MG
20 TABLET ORAL NIGHTLY
Status: DISCONTINUED | OUTPATIENT
Start: 2019-05-14 | End: 2019-05-14

## 2019-05-14 RX ORDER — CARVEDILOL 12.5 MG/1
12.5 TABLET ORAL 2 TIMES DAILY WITH MEALS
Status: DISCONTINUED | OUTPATIENT
Start: 2019-05-14 | End: 2019-05-18

## 2019-05-14 RX ORDER — SODIUM CHLORIDE 0.9 % (FLUSH) 0.9 %
3 SYRINGE (ML) INJECTION AS NEEDED
Status: DISCONTINUED | OUTPATIENT
Start: 2019-05-14 | End: 2019-05-18

## 2019-05-14 RX ORDER — FUROSEMIDE 10 MG/ML
40 INJECTION INTRAMUSCULAR; INTRAVENOUS
Status: DISCONTINUED | OUTPATIENT
Start: 2019-05-14 | End: 2019-05-18

## 2019-05-14 RX ORDER — DOCUSATE SODIUM 100 MG/1
100 CAPSULE, LIQUID FILLED ORAL 2 TIMES DAILY
Status: DISCONTINUED | OUTPATIENT
Start: 2019-05-14 | End: 2019-05-18

## 2019-05-14 RX ORDER — SPIRONOLACTONE 25 MG/1
25 TABLET ORAL DAILY
Status: DISCONTINUED | OUTPATIENT
Start: 2019-05-15 | End: 2019-05-18

## 2019-05-14 RX ORDER — SPIRONOLACTONE 25 MG/1
25 TABLET ORAL DAILY
Status: ON HOLD | COMMUNITY
End: 2020-04-13

## 2019-05-14 RX ORDER — POLYETHYLENE GLYCOL 3350 17 G/17G
17 POWDER, FOR SOLUTION ORAL DAILY PRN
Status: DISCONTINUED | OUTPATIENT
Start: 2019-05-14 | End: 2019-05-18

## 2019-05-14 RX ORDER — FUROSEMIDE 10 MG/ML
40 INJECTION INTRAMUSCULAR; INTRAVENOUS ONCE
Status: COMPLETED | OUTPATIENT
Start: 2019-05-14 | End: 2019-05-14

## 2019-05-14 RX ORDER — ALBUTEROL SULFATE 90 UG/1
2 AEROSOL, METERED RESPIRATORY (INHALATION) EVERY 4 HOURS PRN
Status: DISCONTINUED | OUTPATIENT
Start: 2019-05-14 | End: 2019-05-18

## 2019-05-14 RX ORDER — BISACODYL 10 MG
10 SUPPOSITORY, RECTAL RECTAL
Status: DISCONTINUED | OUTPATIENT
Start: 2019-05-14 | End: 2019-05-18

## 2019-05-14 RX ORDER — ISOSORBIDE DINITRATE 20 MG/1
20 TABLET ORAL 2 TIMES DAILY
Status: DISCONTINUED | OUTPATIENT
Start: 2019-05-14 | End: 2019-05-16

## 2019-05-14 RX ORDER — DEXTROSE MONOHYDRATE 25 G/50ML
50 INJECTION, SOLUTION INTRAVENOUS AS NEEDED
Status: DISCONTINUED | OUTPATIENT
Start: 2019-05-14 | End: 2019-05-18

## 2019-05-14 RX ORDER — ACETAMINOPHEN 325 MG/1
650 TABLET ORAL EVERY 6 HOURS PRN
Status: DISCONTINUED | OUTPATIENT
Start: 2019-05-14 | End: 2019-05-18

## 2019-05-14 RX ORDER — BRIMONIDINE TARTRATE 2 MG/ML
1 SOLUTION/ DROPS OPHTHALMIC 3 TIMES DAILY
Status: DISCONTINUED | OUTPATIENT
Start: 2019-05-14 | End: 2019-05-18

## 2019-05-14 RX ORDER — CLOPIDOGREL BISULFATE 75 MG/1
75 TABLET ORAL DAILY
Status: DISCONTINUED | OUTPATIENT
Start: 2019-05-14 | End: 2019-05-18

## 2019-05-14 RX ORDER — TIMOLOL MALEATE 5 MG/ML
1 SOLUTION/ DROPS OPHTHALMIC 2 TIMES DAILY
Status: DISCONTINUED | OUTPATIENT
Start: 2019-05-14 | End: 2019-05-14

## 2019-05-14 RX ORDER — METOCLOPRAMIDE HYDROCHLORIDE 5 MG/ML
5 INJECTION INTRAMUSCULAR; INTRAVENOUS EVERY 8 HOURS PRN
Status: DISCONTINUED | OUTPATIENT
Start: 2019-05-14 | End: 2019-05-18

## 2019-05-14 RX ORDER — DORZOLAMIDE HYDROCHLORIDE AND TIMOLOL MALEATE 20; 5 MG/ML; MG/ML
1 SOLUTION/ DROPS OPHTHALMIC 2 TIMES DAILY
Status: DISCONTINUED | OUTPATIENT
Start: 2019-05-14 | End: 2019-05-18

## 2019-05-14 NOTE — ED PROVIDER NOTES
Patient Seen in: [facility] Emergency Department    History   Patient presents with:  Back Pain (musculoskeletal)  Dyspnea VISHAL SOB (respiratory)    Stated Complaint: Lower back pain    HPI    53-year-old female presents with chief complaint of low needed for Nausea. torsemide 20 MG Oral Tab,  Take 20 mg by mouth daily. simvastatin 40 MG Oral Tab,  Take 40 mg by mouth nightly. glipiZIDE 10 MG Oral Tab,  Take 10 mg by mouth 2 (two) times daily before meals.    Dorzolamide HCl-Timolol Mal 22.3-6.8 Ht 152.4 cm (5')   Wt 77.1 kg   SpO2 97%   BMI 33.20 kg/m²      PULSE OX within normal limits on room air as interpreted by this provider. Constitutional: The patient is cooperative. Appears well-developed and well-nourished. Mild discomfort.   Psycho PANEL (8) - Abnormal; Notable for the following components:    Glucose 139 (*)     BUN 19 (*)     Creatinine 1.20 (*)     Calculated Osmolality 299 (*)     GFR, Non- 42 (*)     GFR, -American 49 (*)     All other components within no serial reexaminations as previously documented. Results reviewed and need for admission discussed with patient. Patient case discussed with and patient seen by Dr. Robin Curtis. Patient case discussed with Dr. Arturo Tejeda. Recommending 40 mg of IV Lasix.   Ishan Kovacs

## 2019-05-14 NOTE — PROGRESS NOTES
[facility] Pharmacy Note:  Renal Dose Adjustment for Metoclopramide (REGLAN)    John Sanchez has been prescribed Metoclopramide (REGLAN) 10 mg every 8 hours as needed for nausea and vomiting.     Estimated Creatinine Clearance: 31.8 mL/min (A) (based on SCr of 1

## 2019-05-14 NOTE — PLAN OF CARE
Problem: Patient Centered Care  Goal: Patient preferences are identified and integrated in the patient's plan of care  Description  Interventions:  - What would you like us to know as we care for you?  From home with family.  - Provide timely, complete, a hemodynamic stability  - Monitor arterial and/or venous puncture sites for bleeding and/or hematoma  - Assess quality of pulses, skin color and temperature  - Assess for signs of decreased coronary artery perfusion - ex.  Angina  - Evaluate fluid balance, a Electrolytes maintained within normal limits  Description  INTERVENTIONS:  - Monitor labs and rhythm and assess patient for signs and symptoms of electrolyte imbalances  - Administer electrolyte replacement as ordered  - Monitor response to electrolyte rep

## 2019-05-14 NOTE — ED INITIAL ASSESSMENT (HPI)
Pt presents via EMS for eval of \"Knot in lower back pain. \" Pt states \" I was trying to get out of bed to go to the bathroom, when I suddenly felt a knot in my back. I couldn't get out of the bed. \" Pt denies chest pain. Labored breathing noted.  Pt state

## 2019-05-14 NOTE — CONSULTS
[facility] Cardiology Consult Note    Torito Carl Patient Status:  Emergency    1935 MRN [id number]   Location [address] Attending [facility] Medrano[facility] Dreian [facility]     82year old female, consulte • Gout    • High blood pressure    • Hyperlipidemia    • Mitral regurgitation    • Obstructive airway disease (HCC)    • Reactive airway disease    • Renal disorder     CKD stage 3   • Stroke [facility])    • Thyroid disease    • Type II or unspecified type di

## 2019-05-15 ENCOUNTER — APPOINTMENT (OUTPATIENT)
Dept: CT IMAGING | Facility: HOSPITAL | Age: 84
DRG: 291 | End: 2019-05-15
Attending: INTERNAL MEDICINE
Payer: MEDICARE

## 2019-05-15 PROCEDURE — 70450 CT HEAD/BRAIN W/O DYE: CPT | Performed by: INTERNAL MEDICINE

## 2019-05-15 PROCEDURE — 99222 1ST HOSP IP/OBS MODERATE 55: CPT | Performed by: PHYSICAL MEDICINE & REHABILITATION

## 2019-05-15 NOTE — PLAN OF CARE
Problem: Patient Centered Care  Goal: Patient preferences are identified and integrated in the patient's plan of care  Description  Interventions:  - What would you like us to know as we care for you?  From home with family.  - Provide timely, complete, a hemodynamic stability  - Monitor arterial and/or venous puncture sites for bleeding and/or hematoma  - Assess quality of pulses, skin color and temperature  - Assess for signs of decreased coronary artery perfusion - ex.  Angina  - Evaluate fluid balance, a Electrolytes maintained within normal limits  Description  INTERVENTIONS:  - Monitor labs and rhythm and assess patient for signs and symptoms of electrolyte imbalances  - Administer electrolyte replacement as ordered  - Monitor response to electrolyte rep effects  - Notify MD/LIP if interventions unsuccessful or patient reports new pain  - Anticipate increased pain with activity and pre-medicate as appropriate  Outcome: Progressing     Pt A&Ox3, forgetful at times, on RA. IV lasix BID. Accucheck ACHS.  Incon

## 2019-05-15 NOTE — H&P
[facility]    PATIENT'S NAME: Jose Eduardo MCMILLAN   ATTENDING PHYSICIAN: Stevie Avila MD   PATIENT ACCOUNT#:   [id number]    LOCATION:  [address] [place] [address] [address] #:   [id number]       YOB: 1935  ADMISSION DATE:       05/14/201 not drink or take illicit drugs. Patient is  and lives with her  in [place]. REVIEW OF SYSTEMS:  She denies frequent headaches or recent change in vision. The patient has not seen the ophthalmologist recently.   I have encouraged her to cardiology consultation obtained. We will have Physiatry see patient. Further recommendations pending clinical course. Dictated By Hilary Barnes MD  d: 05/14/2019 18:41:19  t: 05/14/2019 19:46:38  Job [id number]  [facility]/    cc: Gio Mckenzie.

## 2019-05-15 NOTE — PROGRESS NOTES
[facility] AND [facility]  [facility] Cardiology Progress Note    Baldemar Stanley Patient Status:  Inpatient    1935 MRN [id number]   Location [facility] 3W/SW Attending Delmy Sanchez MD   Hosp Day # 1 PCP Abbey Sanon MD     82year old female, cons Obstructive airway disease (HCC)    • Reactive airway disease    • Renal disorder     CKD stage 3   • Stroke [facility])    • Thyroid disease    • Type II or unspecified type diabetes mellitus without mention of complication, not stated as uncontrolled      Past

## 2019-05-15 NOTE — PLAN OF CARE
Problem: Patient Centered Care  Goal: Patient preferences are identified and integrated in the patient's plan of care  Description  Interventions:  - What would you like us to know as we care for you?  From home with family.  - Provide timely, complete, a hemodynamic stability  - Monitor arterial and/or venous puncture sites for bleeding and/or hematoma  - Assess quality of pulses, skin color and temperature  - Assess for signs of decreased coronary artery perfusion - ex.  Angina  - Evaluate fluid balance, a Electrolytes maintained within normal limits  Description  INTERVENTIONS:  - Monitor labs and rhythm and assess patient for signs and symptoms of electrolyte imbalances  - Administer electrolyte replacement as ordered  - Monitor response to electrolyte rep distraction and/or relaxation techniques  - Monitor for opioid side effects  - Notify MD/LIP if interventions unsuccessful or patient reports new pain  - Anticipate increased pain with activity and pre-medicate as appropriate  Outcome: Progressing

## 2019-05-15 NOTE — PROGRESS NOTES
[facility]    Progress Note    Sapphire Gonzalez Patient Status:  Inpatient    1935 MRN [id number]   Location [facility] 3W/SW Attending Lawanda Wells MD   Hosp Day # 1 PCP Domo Ross MD       Subjective:    Sapphire Gonzalez i 10/08/2018 1.69 (H) 0.50 - 1.50 mg/dL Final   10/07/2018 1.80 (H) 0.50 - 1.50 mg/dL Final   10/06/2018 2.17 (H) 0.50 - 1.50 mg/dL Final   10/05/2018 2.91 (H) 0.50 - 1.50 mg/dL Final     CREATININE (P)   Date Value Ref Range Status   12/17/2014 2.59 (H) 0 04/08/2014 36 (L) >60 Final     Comment:     Chronic Kidney Disease: GFR <60 ml/min/1.73 m2  Kidney failure: GFR <15 ml/min/1.73 m2  The accuracy of the MDRD equation is not suitable   for acute renal failure patients and it is not   recommended for use Final   09/30/2018 6.3 4.0 - 11.0 K/UL Final   06/25/2018 6.8 4.0 - 11.0 K/UL Final     WHITE BLOOD COUNT (L)   Date Value Ref Range Status   12/27/2014 7.4 4.0 - 11.0 K/UL Final   12/17/2014 8.3 4.0 - 11.0 K/UL Final   12/09/2014 6.6 4.0 - 11.0 K/UL Final Oral Daily   • docusate sodium  100 mg Oral BID   • Insulin Aspart Pen  1-5 Units Subcutaneous TID CC   • furosemide  40 mg Intravenous BID (Diuretic)   • carvedilol  12.5 mg Oral BID with meals   • brimonidine Tartrate  1 drop Both Eyes TID   • atorvastat

## 2019-05-15 NOTE — CONSULTS
[facility]  Physical Medicine and Rehabilitation  Inpatient Consult Note    Requesting Physician: Dr. Broderick Velasco (Reason for Visit):  Patient presents with:  Back Pain (musculoskeletal)  Dyspnea VISHAL SOB (respiratory)      Hist per  family hx   • Diabetes Other         per  family hx   • Cancer Other         per  family hx       SOCIAL HISTORY:   Social History    Occupational History      Not on file    Tobacco Use      Smoking status: Former Smoker        Types: Cigarett crest and shoulder heights are symmetrical.  Postural exam reveals no scoliosis or kyphosis. Palpation: Tender to palpation over the left SI joint region and left L5-S1 paraspinal region.   No tenderness over the midline spinous processes, gluteus medius, Urine 12/17/2018 Negative  Negative mg/dL Final   • Glucose Urine 12/17/2018 Negative  Negative mg/dL Final   • Ketones Urine 12/17/2018 Negative  Negative mg/dL Final   • Bilirubin Urine 12/17/2018 Negative  Negative Final   • Blood Urine 12/17/2018 Negat 8/28/2015,      INDICATIONS: Left lower back pain for one week, no known injury     TECHNIQUE: Lumbar spine radiographs (2-3 views)       FINDINGS:      VERTEBRAL BODIES:   Levoscoliosis and multilevel lumbar spondylosis.   Degenerative anterior spondylolis consolidation or pleural effusion. Correlate clinically.            Dictated by (CST): Valentin Erickson MD on 5/14/2019 at 12:39       Approved by (CST): Valentin Erickson MD on 5/14/2019 at 12:41              Therapy Progress:  · Pending evaluations      Asses

## 2019-05-15 NOTE — CM/SW NOTE
MDO received. SW has worked with this pt through her 's hospitalizations. SW attempted visit for initial assessment and dc planning, but pt is sound asleep. Voicemail left to daughter Richard Villasenor [phone], awaiting response.     Pt is being recommended

## 2019-05-15 NOTE — PHYSICAL THERAPY NOTE
PHYSICAL THERAPY EVALUATION - INPATIENT     Room Number: 336/336-A  Evaluation Date: 5/15/2019  Type of Evaluation: Initial   Physician Order: PT Eval and Treat    Presenting Problem: Intractable pain on L side LB area s/p fall x 1 wk ago  Reason for Ther discharge. DISCHARGE RECOMMENDATIONS  PT Discharge Recommendations: Home with home health PT;Sub-acute rehabilitation(pending progress)    PLAN  PT Treatment Plan: Bed mobility; Body mechanics; Endurance; Patient education; Family education;Gait training;St uncontrolled        Past Surgical History  Past Surgical History:   Procedure Laterality Date   • LUMBAR INTERLAMINAR EPIDURAL INJECTION N/A 8/3/2017    Performed by Riu Toussaint MD at [address] [place] [address]  Type of Home: [facility] arms (e.g., wheelchair, bedside commode, etc.): A Lot   -   Moving from lying on back to sitting on the side of the bed?: A Lot   How much help from another person does the patient currently need. ..   -   Moving to and from a bed to a chair (including a wh

## 2019-05-16 ENCOUNTER — APPOINTMENT (OUTPATIENT)
Dept: CV DIAGNOSTICS | Facility: HOSPITAL | Age: 84
DRG: 291 | End: 2019-05-16
Attending: NURSE PRACTITIONER
Payer: MEDICARE

## 2019-05-16 PROCEDURE — 93306 TTE W/DOPPLER COMPLETE: CPT | Performed by: NURSE PRACTITIONER

## 2019-05-16 PROCEDURE — 99232 SBSQ HOSP IP/OBS MODERATE 35: CPT | Performed by: PHYSICAL MEDICINE & REHABILITATION

## 2019-05-16 RX ORDER — TRAMADOL HYDROCHLORIDE 50 MG/1
50 TABLET ORAL EVERY 6 HOURS PRN
Status: DISCONTINUED | OUTPATIENT
Start: 2019-05-16 | End: 2019-05-17

## 2019-05-16 RX ORDER — ISOSORBIDE DINITRATE 20 MG/1
40 TABLET ORAL
Status: DISCONTINUED | OUTPATIENT
Start: 2019-05-16 | End: 2019-05-18

## 2019-05-16 NOTE — PLAN OF CARE
Problem: Patient Centered Care  Goal: Patient preferences are identified and integrated in the patient's plan of care  Description  Interventions:  - What would you like us to know as we care for you?  From home with family.  - Provide timely, complete, a deep breathe, Incentive Spirometry  - Assess the need for suctioning and perform as needed  - Assess and instruct to report SOB or any respiratory difficulty  - Respiratory Therapy support as indicated  - Manage/alleviate anxiety  - Monitor for signs/sympt CHF    Interventions:  - Educate patient and family on CHF  -Fall precautions.   - See additional Care Plan goals for specific interventions   Outcome: Not Progressing  Goal: Patient/Family Short Term Goal  Description  Patient's Short Term Goal: No more pa Implement non-pharmacological measures as appropriate and evaluate response  - Consider cultural and social influences on pain and pain management  - Manage/alleviate anxiety  - Utilize distraction and/or relaxation techniques  - Monitor for opioid side ef

## 2019-05-16 NOTE — PROGRESS NOTES
[facility] AND [facility]  [facility] Cardiology Progress Note    Carli Upton Patient Status:  Inpatient    1935 MRN [id number]   Location [facility] 3W/SW Attending Ariel Maldonado MD   Hosp Day # 2 PCP Rex Artis MD     82year old female, cons --------------------------------------------------------------------------------------------------------------------------------  ROS 10 systems reviewed, pertinent findings above.   ROS    History:  Past Medical History:   Diagnosis Date   • Anemia diminished  Abdomen: Soft, non-tender. BS-present. Extremities: Without clubbing, cyanosis or edema. Peripheral pulses are 2+. Neurologic: Alert and oriented, normal affect. Skin: Warm and dry.

## 2019-05-16 NOTE — PHYSICAL THERAPY NOTE
PHYSICAL THERAPY TREATMENT NOTE - INPATIENT     Room Number: 336/336-A       Presenting Problem: Intractable pain on L side LB area s/p fall x 1 wk ago    Problem List  Principal Problem:    Acute on chronic congestive heart failure, unspecified heart fail Sitting down on and standing up from a chair with arms (e.g., wheelchair, bedside commode, etc.): A Lot   -   Moving from lying on back to sitting on the side of the bed?: A Lot   How much help from another person does the patient currently need. ..   -   M

## 2019-05-16 NOTE — PROGRESS NOTES
[facility]    Progress Note    Maged Emanuel Patient Status:  Inpatient    1935 MRN [id number]   Location [facility] 3W/SW Attending Elmira Brown MD   Hosp Day # 2 PCP Brianna Burgess MD       Subjective:    Maged Emanuel i 05/15/19  2120   PGLU 111* 145* 102* 159*       Creatinine   Date Value Ref Range Status   05/14/2019 1.20 (H) 0.55 - 1.02 mg/dL Final   12/17/2018 2.04 (H) 0.50 - 1.50 mg/dL Final   10/12/2018 1.67 (H) 0.50 - 1.50 mg/dL Final   10/08/2018 1.69 (H) 0.50 - Disease: GFR <60 ml/min/1.73 m2  Kidney failure: GFR <15 ml/min/1.73 m2  The accuracy of the MDRD equation is not suitable   for acute renal failure patients and it is not   recommended for use with pregnant women.      04/08/2014 36 (L) >60 Final     Comme Value Ref Range Status   05/14/2019 7.8 4.0 - 11.0 x10(3) uL Final   12/17/2018 7.5 4.0 - 11.0 K/UL Final   10/12/2018 6.2 4.0 - 11.0 K/UL Final   10/04/2018 6.6 4.0 - 11.0 K/UL Final   10/03/2018 6.7 4.0 - 11.0 K/UL Final   09/30/2018 6.3 4.0 - 11.0 K/UL Clopidogrel Bisulfate  75 mg Oral Daily   • Dorzolamide HCl-Timolol Mal  1 drop Both Eyes BID   • hydrALAZINE HCl  100 mg Oral TID   • isosorbide dinitrate  20 mg Oral BID   • latanoprost  1 drop Both Eyes BID   • linagliptin  5 mg Oral Daily   • spironola Miguelito Spencer MD on 5/14/2019 at 20:05     Approved by (CST): Miguelito Spencer MD on 5/14/2019 at 20:08          Ekg 12-lead    Result Date: 5/14/2019  ECG Report  Interpretation  -------------------------- Sinus Tachycardia -Right bundle branch b

## 2019-05-16 NOTE — PROGRESS NOTES
[facility]  Physical Medicine and Rehabilitation  Inpatient Progress Note    Requesting Physician: Dr. Hilario Andres    Chief Complaint (Reason for Visit):  Patient presents with:  Back Pain (musculoskeletal)  Dyspnea VISHAL SOB (respiratory)      His N/A 8/3/2017    Performed by Neel Calles MD at [facility] MAIN OR        FAMILY HISTORY:   Family History   Problem Relation Age of Onset   • Hypertension Other         per ng family hx   • Diabetes Other         per ng family hx   • Cancer Other         p intact, spontaneous speech intact  Motor:    Musculoskeletal:    LUMBAR SPINE:  Inspection: no erythema, swelling, or obvious deformity. Their iliac crest and shoulder heights are symmetrical.  Postural exam reveals no scoliosis or kyphosis.   Palpation: T Clarity Urine 12/17/2018 Hazy* Clear Final   • Spec Gravity 12/17/2018 1.012  1.002 - 1.035 Final   • pH Urine 12/17/2018 5.0  5.0 - 8.0 Final   • Protein Urine 12/17/2018 Negative  Negative mg/dL Final   • Glucose Urine 12/17/2018 Negative  Negative mg/dL PROCEDURE: CT BRAIN OR HEAD (CPT=70450)     COMPARISON: [facility], MRI BRAIN (EXZ=05028), 6/25/2018, 15:48. [address], 3/07/2014, 14:51.   [facility], CT BRAIN OR HEAD (CPT=70450) is no apparent depressed fracture, mass, or other significant visible lesion. SINUSES: Limited views demonstrate no significant mucosal thickening or fluid. ORBITS: Limited views are grossly unremarkable.        OTHER: Atherosclerotic vascular calcifi cane with 24/7 supervision from her family.  During this eval , she needs mod A in sit<>stand with cueing on apporp hand placement and  and is able to walk~ 10' with RW as support mod A c/o excruciating pain 10/10 \" I'd rather die than having can increase this. Would not recommend NSAIDs as patient has multiple comorbidities which can be further exacerbated with NSAIDs. · Continue with physical therapy while the patient is inpatient.   Patient should be discharged with outpatient physical ther

## 2019-05-16 NOTE — CM/SW NOTE
TAMRA/Laney left pt's dtr, Cynthia Cuencahing a voicemail. SW left an additional voicemail to dtr, Cynthia Rushing and for , Vivian Sidhu. Awaiting response to discuss discharge planning needs.      SW met w/ pt who stated she lives at home w/ her  and granddaughter in [place]

## 2019-05-16 NOTE — PLAN OF CARE
Problem: Patient Centered Care  Goal: Patient preferences are identified and integrated in the patient's plan of care  Description  Interventions:  - What would you like us to know as we care for you?  From home with family.  - Provide timely, complete, a hemodynamic stability  - Monitor arterial and/or venous puncture sites for bleeding and/or hematoma  - Assess quality of pulses, skin color and temperature  - Assess for signs of decreased coronary artery perfusion - ex.  Angina  - Evaluate fluid balance, a Electrolytes maintained within normal limits  Description  INTERVENTIONS:  - Monitor labs and rhythm and assess patient for signs and symptoms of electrolyte imbalances  - Administer electrolyte replacement as ordered  - Monitor response to electrolyte rep pain  - Anticipate increased pain with activity and pre-medicate as appropriate  Outcome: Progressing

## 2019-05-17 PROCEDURE — 99232 SBSQ HOSP IP/OBS MODERATE 35: CPT | Performed by: PHYSICAL MEDICINE & REHABILITATION

## 2019-05-17 RX ORDER — DONEPEZIL HYDROCHLORIDE 5 MG/1
5 TABLET, FILM COATED ORAL NIGHTLY
Status: DISCONTINUED | OUTPATIENT
Start: 2019-05-17 | End: 2019-05-18

## 2019-05-17 RX ORDER — TRAMADOL HYDROCHLORIDE 50 MG/1
50 TABLET ORAL EVERY 6 HOURS PRN
Qty: 90 TABLET | Refills: 0 | Status: ON HOLD | OUTPATIENT
Start: 2019-05-17 | End: 2019-10-30

## 2019-05-17 RX ORDER — DONEPEZIL HYDROCHLORIDE 5 MG/1
5 TABLET, FILM COATED ORAL NIGHTLY
Qty: 30 TABLET | Refills: 0 | Status: ON HOLD | OUTPATIENT
Start: 2019-05-17 | End: 2020-05-21

## 2019-05-17 RX ORDER — TRAMADOL HYDROCHLORIDE 50 MG/1
50 TABLET ORAL EVERY 12 HOURS PRN
Status: DISCONTINUED | OUTPATIENT
Start: 2019-05-17 | End: 2019-05-18

## 2019-05-17 RX ORDER — CARVEDILOL 12.5 MG/1
12.5 TABLET ORAL 2 TIMES DAILY WITH MEALS
Qty: 60 TABLET | Refills: 0 | Status: ON HOLD | OUTPATIENT
Start: 2019-05-17 | End: 2020-05-20

## 2019-05-17 NOTE — CM/SW NOTE
443pm:   [facility] orders have been entered. ----------------------------------------------------------------------------  1038am:   ORA informed SW anticipated discharge today. TAMRA notified Lashonda Hough from [facility] w/ update.      Sharan Ca

## 2019-05-17 NOTE — PROGRESS NOTES
[facility]    Progress Note    Estrada Villa Patient Status:  Inpatient    1935 MRN [id number]   Location [facility] 3W/SW Attending Odette Waterman MD   Hosp Day # 3 PCP Ivynando Morel MD       Subjective:    Estrada Villa i 110 109 105   CO2 21.0 26.0 28.0       No results for input(s): LIP, DANNA in the last 168 hours. Recent Labs   Lab 05/14/19  1203   TROP 0.050*       No results for input(s): PT, INR in the last 168 hours.     Recent Labs   Lab 05/16/19  0922 05/16/19  12 use with pregnant women.      09/23/2014 47 (L) >60 Final     Comment:     Chronic Kidney Disease: GFR <60 ml/min/1.73 m2  Kidney failure: GFR <15 ml/min/1.73 m2  The accuracy of the MDRD equation is not suitable  for acute renal failure patients and it is (L) >60 Final   10/07/2014 38 (L) >60 Final   09/23/2014 39 (L) >60 Final   06/10/2014 36 (L) >60 Final   04/08/2014 30 (L) >60 Final     GFR, Non-   Date Value Ref Range Status   05/17/2019 31 (L) >=60 Final   05/16/2019 35 (L) >=60 Final 05/14/2019 245.0 150.0 - 450.0 10(3)uL Final   12/17/2018 233 140 - 400 K/UL Final   10/12/2018 222 140 - 400 K/UL Final   10/04/2018 204 140 - 400 K/UL Final   10/03/2018 224 140 - 400 K/UL Final   09/30/2018 209 140 - 400 K/UL Final   06/25/2018 174 14 5/15/2019 at 14:03          Ekg 12-lead    Result Date: 5/16/2019  ECG Report  Interpretation  -------------------------- Sinus Rhythm - frequent PAC s Left Bundle Branch Block ABNORMAL When compared with ECG of 05/14/2019 12:03:49 No significant changes h

## 2019-05-17 NOTE — PROGRESS NOTES
[facility] Pharmacy Note:  Renal Dose Adjustment for Tramadol Alexey Orellana    Baldemar Stanley has been prescribed Tramadol (ULTRAM) 50 mg orally every 6 hours as needed for pain. Estimated Creatinine Clearance: 24.7 mL/min (A) (based on SCr of 1.54 mg/dL (H)).     H

## 2019-05-17 NOTE — HOME CARE LIAISON
Received referral from Cherrie Waterman for [facility] services on discharge for patient. Attempted to meet with the patient X 2. Residential brochure and liaison card left in patient's room.  Attempted to call patient's daughter, Erwin Harvey and left a voice

## 2019-05-17 NOTE — PROGRESS NOTES
[facility]  [facility] Cardiology Progress Note    Caitie Mart Patient Status:  Inpatient    1935 MRN [id number]   Location [facility] 3W/SW Attending Clarence Franklin MD   Hosp Day # 3 PCP Essence Hicks MD       Assessment and Plan: increased in a pattern of mild LVH. Systolic function was     severely reduced. The estimated ejection fraction was 25%.     Generalized hypokinesis.  Doppler parameters are consistent with     abnormal left ventricular relaxation (grade 1 diastolic     dys

## 2019-05-17 NOTE — PLAN OF CARE
Problem: Diabetes/Glucose Control  Goal: Glucose maintained within prescribed range  Description  INTERVENTIONS:  - Monitor Blood Glucose as ordered  - Assess for signs and symptoms of hyperglycemia and hypoglycemia  - Administer ordered medications to m target range  - Assess barriers to adequate nutritional intake and initiate nutrition consult as needed  - Instruct patient on self management of diabetes  Outcome: Progressing  Goal: Electrolytes maintained within normal limits  Description  INTERVENTIONS

## 2019-05-17 NOTE — PROGRESS NOTES
[facility]  Physical Medicine and Rehabilitation  Inpatient Progress Note    Requesting Physician: Dr. Horacio Li (Reason for Visit):  Patient presents with:  Back Pain (musculoskeletal)  Dyspnea VISHLA SOB (respiratory)      His since quittin.8      Smokeless tobacco: Never Used    Substance and Sexual Activity      Alcohol use: No      Drug use: No      Sexual activity: Not on file      CURRENT MEDICATIONS:     Current Outpatient Medications:  Donepezil HCl 5 MG Oral Tab Veronica Rase and shoulder heights are symmetrical.  Postural exam reveals no scoliosis or kyphosis.   Palpation: Tender to palpation over the left SI joint region and left L5-S1 paraspinal region.  No tenderness over the midline spinous processes, gluteus medius, pirifo 12/17/2018 Negative  Negative mg/dL Final   • Glucose Urine 12/17/2018 Negative  Negative mg/dL Final   • Ketones Urine 12/17/2018 Negative  Negative mg/dL Final   • Bilirubin Urine 12/17/2018 Negative  Negative Final   • Blood Urine 12/17/2018 Negative  N complete 2D, complete spectral Doppler, and color Doppler  -------------------------------------------------------------------  Delmar Ba 83 08/02/1935  H: [id number]                                E: [id number]  Study d 10/02/2018. Study  status:  Elective. Procedure:  Transthoracic echocardiography. The  study was technically limited due to poor acoustic window  availability. Scanning was performed from the parasternal, apical,  and subcostal acoustic windows.   Study c pressure was  increased consistent with severe pulmonary hypertension. Right atrium:  The atrium was mildly to moderately dilated. Pericardium:  There was no pericardial effusion. Quality of study:  Image quality was adequate.   Systemic veins:  Inferior 0.6 - 0.9      LVOT                           Value        10/02/2018 Reference   LVOT ID, S                     1.9   cm     1.8        -----------      Aortic valve                   Value        10/02/2018 Reference   Aortic regurg pressure         326 0.13  Legend:  (L)  and  (H)  lucas values outside specified reference range. Electronically signed       05/16/2019 18:07  Artem Smalls      Therapy Progress:  Pt seen daily. Pt educated on deep breathing,relaxation and energy conservation technique. Min a f

## 2019-05-18 VITALS
HEART RATE: 80 BPM | RESPIRATION RATE: 20 BRPM | SYSTOLIC BLOOD PRESSURE: 131 MMHG | HEIGHT: 64 IN | DIASTOLIC BLOOD PRESSURE: 96 MMHG | TEMPERATURE: 98 F | BODY MASS INDEX: 26.29 KG/M2 | OXYGEN SATURATION: 96 % | WEIGHT: 154 LBS

## 2019-05-18 RX ORDER — TORSEMIDE 20 MG/1
20 TABLET ORAL
Qty: 60 TABLET | Refills: 0 | Status: ON HOLD | OUTPATIENT
Start: 2019-05-18 | End: 2020-02-07

## 2019-05-18 RX ORDER — TORSEMIDE 20 MG/1
20 TABLET ORAL
Status: DISCONTINUED | OUTPATIENT
Start: 2019-05-18 | End: 2019-05-18

## 2019-05-18 NOTE — CM/SW NOTE
05/18/19 1200   Discharge disposition   Expected discharge disposition Home-Health   Name of Facillity/Home Care/Hospice Residential   Discharge transportation Other (comment)  ([address])    MDO received for discharge, Patients family at the

## 2019-05-18 NOTE — TRANSITION NOTE
82year old female, consulted for heart failure  · ICM EF 30-35% on echo Oct 2018, moderate MR  · RHC Oct 2018 RA 6, PA 49/16, Wedge 15, SVR 1800, CO 3.2  · CKD  · COPD     Cardiac Risk Factors:  (+)Age, (+)HTN, (+)HPL, (+)DM, (-)FH for premature CAD, (for chest pain, still with edema      Plan:   1. Agree with ongoing decongestion   2. May need to revisit ischemic eval (h/o of PCI LAD 2014)  3.   Increase isordil      Geno Sanz MD, [facility] [place]  Heart Failure Transplant / Pulmonary HTN      LAST NOTE BY CARDIOLOGY The right ventricular systolic pressure was     increased consistent with severe pulmonary hypertension. Compared to the previous study these findings represent indicate  worsening.  Right ventricle finding are newly noted         Discharge Medications as: GLUCOTROL      Take 10 mg by mouth 2 (two) times daily before meals. Refills:  0     hydrALAZINE HCl 100 MG Tabs  Commonly known as:  APRESOLINE      Take 100 mg by mouth 3 (three) times daily.    Refills:  0     isosorbide dinitrate 20 MG Tabs  Comm

## 2019-05-18 NOTE — PROGRESS NOTES
[facility] AND [facility]  [facility] Cardiology Progress Note    Mckennasherron Cuevas Patient Status:  Inpatient    1935 MRN [id number]   Location [facility] 3W/SW Attending Soniya Putnam MD   Hosp Day # 4 PCP Reyna Bhardwaj MD       Assessment and Plan: LVH. Systolic function was     severely reduced. The estimated ejection fraction was 25%.     Generalized hypokinesis. Doppler parameters are consistent with     abnormal left ventricular relaxation (grade 1 diastolic     dysfunction).   2. Aortic valve: Tr

## 2019-05-18 NOTE — PROGRESS NOTES
[facility]    Progress Note    Heather Medico Patient Status:  Inpatient    1935 MRN [id number]   Location [facility] 3W/SW Attending Shima Aleman MD   Hosp Day # 4 PCP Radha Nichole MD       Subjective:    Heather Monrealo i BUN 19* 23* 25*   CREATSERUM 1.20* 1.41* 1.54*   GFRAA 49* 40* 36*   GFRNAA 42* 35* 31*   CA 9.5 8.6 8.6    142 139   K 4.5 3.8 3.8    109 105   CO2 21.0 26.0 28.0       No results for input(s): LIP, DANNA in the last 168 hours.     Recent Labs failure: GFR <15 ml/min/1.73 m2  The accuracy of the MDRD equation is not suitable  for acute renal failure patients and it is not  recommended for use with pregnant women.      09/23/2014 47 (L) >60 Final     Comment:     Chronic Kidney Disease: GFR <60 ml meters  eGFR calculated by the CKD-EPI equation.      GFR/NON-   Date Value Ref Range Status   12/17/2014 18 (L) >60 Final   12/09/2014 20 (L) >60 Final   10/07/2014 38 (L) >60 Final   09/23/2014 39 (L) >60 Final   06/10/2014 36 (L) >60 Kia 16.0 G/DL Final   04/08/2014 11.0 (L) 12.0 - 16.0 G/DL Final     PLT   Date Value Ref Range Status   05/17/2019 246.0 150.0 - 450.0 10(3)uL Final   05/14/2019 245.0 150.0 - 450.0 10(3)uL Final   12/17/2018 233 140 - 400 K/UL Final   10/12/2018 222 140 - 40

## 2019-05-18 NOTE — PLAN OF CARE
Problem: Patient Centered Care  Goal: Patient preferences are identified and integrated in the patient's plan of care  Description  Interventions:  - What would you like us to know as we care for you?  From home with family.  - Provide timely, complete, a hemodynamic stability  - Monitor arterial and/or venous puncture sites for bleeding and/or hematoma  - Assess quality of pulses, skin color and temperature  - Assess for signs of decreased coronary artery perfusion - ex.  Angina  - Evaluate fluid balance, a Electrolytes maintained within normal limits  Description  INTERVENTIONS:  - Monitor labs and rhythm and assess patient for signs and symptoms of electrolyte imbalances  - Administer electrolyte replacement as ordered  - Monitor response to electrolyte rep distraction and/or relaxation techniques  - Monitor for opioid side effects  - Notify MD/LIP if interventions unsuccessful or patient reports new pain  - Anticipate increased pain with activity and pre-medicate as appropriate  Outcome: Progressing 5

## 2019-05-18 NOTE — PLAN OF CARE
Problem: Patient Centered Care  Goal: Patient preferences are identified and integrated in the patient's plan of care  Description  Interventions:  - What would you like us to know as we care for you?  From home with family.  - Provide timely, complete, a edema, trend weights  Outcome: Progressing  Goal: Absence of cardiac arrhythmias or at baseline  Description  INTERVENTIONS:  - Continuous cardiac monitoring, monitor vital signs, obtain 12 lead EKG if indicated  - Evaluate effectiveness of antiarrhythmic appropriate  Outcome: Progressing     Problem: Patient/Family Goals  Goal: Patient/Family Long Term Goal  Description  Patient's Long Term Goal: No more pain. Manage CHF    Interventions:  - Educate patient and family on CHF  -Fall precautions.   - See margaret

## 2019-05-20 ENCOUNTER — TELEPHONE (OUTPATIENT)
Dept: CARDIOLOGY UNIT | Facility: HOSPITAL | Age: 84
End: 2019-05-20

## 2019-05-23 ENCOUNTER — LAB REQUISITION (OUTPATIENT)
Dept: LAB | Facility: HOSPITAL | Age: 84
End: 2019-05-23
Payer: MEDICARE

## 2019-05-23 DIAGNOSIS — E03.9 HYPOTHYROIDISM: ICD-10-CM

## 2019-05-23 DIAGNOSIS — E11.9 TYPE 2 DIABETES MELLITUS WITHOUT COMPLICATIONS (HCC): ICD-10-CM

## 2019-05-23 DIAGNOSIS — M10.9 GOUT: ICD-10-CM

## 2019-05-23 DIAGNOSIS — E78.00 PURE HYPERCHOLESTEROLEMIA: ICD-10-CM

## 2019-05-23 PROCEDURE — 84460 ALANINE AMINO (ALT) (SGPT): CPT | Performed by: INTERNAL MEDICINE

## 2019-05-23 PROCEDURE — 83036 HEMOGLOBIN GLYCOSYLATED A1C: CPT | Performed by: INTERNAL MEDICINE

## 2019-05-23 PROCEDURE — 84550 ASSAY OF BLOOD/URIC ACID: CPT | Performed by: INTERNAL MEDICINE

## 2019-05-23 PROCEDURE — 80048 BASIC METABOLIC PNL TOTAL CA: CPT | Performed by: INTERNAL MEDICINE

## 2019-05-23 PROCEDURE — 84443 ASSAY THYROID STIM HORMONE: CPT | Performed by: INTERNAL MEDICINE

## 2019-05-23 PROCEDURE — 80061 LIPID PANEL: CPT | Performed by: INTERNAL MEDICINE

## 2019-05-27 NOTE — DISCHARGE SUMMARY
[facility]    PATIENT'S NAME: Thi MCMILLAN   ATTENDING PHYSICIAN: Stevie Linn MD   PATIENT ACCOUNT#:   [id number]    LOCATION:  [address] [place] [address] [address] #:   [id number]       YOB: 1935  ADMISSION DATE:       05/14/201 week.    Dictated By Stephani Mondragon.  Kentrell Chua MD  d: 05/27/2019 10:32:27  t: 05/27/2019 11:21:26  Job [id number]  [facility]/

## 2019-10-27 ENCOUNTER — APPOINTMENT (OUTPATIENT)
Dept: ULTRASOUND IMAGING | Facility: HOSPITAL | Age: 84
DRG: 291 | End: 2019-10-27
Attending: EMERGENCY MEDICINE
Payer: MEDICARE

## 2019-10-27 ENCOUNTER — HOSPITAL ENCOUNTER (INPATIENT)
Facility: HOSPITAL | Age: 84
LOS: 3 days | Discharge: HOME | DRG: 291 | End: 2019-10-30
Attending: EMERGENCY MEDICINE | Admitting: FAMILY MEDICINE
Payer: MEDICARE

## 2019-10-27 ENCOUNTER — APPOINTMENT (OUTPATIENT)
Dept: GENERAL RADIOLOGY | Facility: HOSPITAL | Age: 84
DRG: 291 | End: 2019-10-27
Attending: EMERGENCY MEDICINE
Payer: MEDICARE

## 2019-10-27 DIAGNOSIS — I50.9 ACUTE ON CHRONIC CONGESTIVE HEART FAILURE, UNSPECIFIED HEART FAILURE TYPE (HCC): Primary | ICD-10-CM

## 2019-10-27 PROCEDURE — 82962 GLUCOSE BLOOD TEST: CPT

## 2019-10-27 PROCEDURE — 96374 THER/PROPH/DIAG INJ IV PUSH: CPT

## 2019-10-27 PROCEDURE — 93970 EXTREMITY STUDY: CPT | Performed by: EMERGENCY MEDICINE

## 2019-10-27 PROCEDURE — 80048 BASIC METABOLIC PNL TOTAL CA: CPT | Performed by: EMERGENCY MEDICINE

## 2019-10-27 PROCEDURE — 83735 ASSAY OF MAGNESIUM: CPT | Performed by: EMERGENCY MEDICINE

## 2019-10-27 PROCEDURE — 83036 HEMOGLOBIN GLYCOSYLATED A1C: CPT | Performed by: FAMILY MEDICINE

## 2019-10-27 PROCEDURE — 99285 EMERGENCY DEPT VISIT HI MDM: CPT

## 2019-10-27 PROCEDURE — 71045 X-RAY EXAM CHEST 1 VIEW: CPT | Performed by: EMERGENCY MEDICINE

## 2019-10-27 PROCEDURE — 93010 ELECTROCARDIOGRAM REPORT: CPT | Performed by: EMERGENCY MEDICINE

## 2019-10-27 PROCEDURE — 81003 URINALYSIS AUTO W/O SCOPE: CPT | Performed by: EMERGENCY MEDICINE

## 2019-10-27 PROCEDURE — 99222 1ST HOSP IP/OBS MODERATE 55: CPT | Performed by: INTERNAL MEDICINE

## 2019-10-27 PROCEDURE — 83880 ASSAY OF NATRIURETIC PEPTIDE: CPT | Performed by: EMERGENCY MEDICINE

## 2019-10-27 PROCEDURE — 93005 ELECTROCARDIOGRAM TRACING: CPT

## 2019-10-27 PROCEDURE — 85025 COMPLETE CBC W/AUTO DIFF WBC: CPT | Performed by: EMERGENCY MEDICINE

## 2019-10-27 RX ORDER — DONEPEZIL HYDROCHLORIDE 5 MG/1
5 TABLET, FILM COATED ORAL NIGHTLY
Status: DISCONTINUED | OUTPATIENT
Start: 2019-10-27 | End: 2019-10-30

## 2019-10-27 RX ORDER — TRAMADOL HYDROCHLORIDE 50 MG/1
50 TABLET ORAL EVERY 6 HOURS PRN
Status: DISCONTINUED | OUTPATIENT
Start: 2019-10-27 | End: 2019-10-27

## 2019-10-27 RX ORDER — LATANOPROST 50 UG/ML
1 SOLUTION/ DROPS OPHTHALMIC NIGHTLY
Status: DISCONTINUED | OUTPATIENT
Start: 2019-10-27 | End: 2019-10-30

## 2019-10-27 RX ORDER — SPIRONOLACTONE 25 MG/1
25 TABLET ORAL DAILY
Status: DISCONTINUED | OUTPATIENT
Start: 2019-10-27 | End: 2019-10-30

## 2019-10-27 RX ORDER — ATORVASTATIN CALCIUM 20 MG/1
20 TABLET, FILM COATED ORAL NIGHTLY
Status: DISCONTINUED | OUTPATIENT
Start: 2019-10-27 | End: 2019-10-30

## 2019-10-27 RX ORDER — CLOPIDOGREL BISULFATE 75 MG/1
75 TABLET ORAL DAILY
Status: DISCONTINUED | OUTPATIENT
Start: 2019-10-28 | End: 2019-10-30

## 2019-10-27 RX ORDER — ISOSORBIDE DINITRATE 20 MG/1
40 TABLET ORAL
Status: DISCONTINUED | OUTPATIENT
Start: 2019-10-27 | End: 2019-10-30

## 2019-10-27 RX ORDER — DEXTROSE MONOHYDRATE 25 G/50ML
50 INJECTION, SOLUTION INTRAVENOUS AS NEEDED
Status: DISCONTINUED | OUTPATIENT
Start: 2019-10-27 | End: 2019-10-30

## 2019-10-27 RX ORDER — LATANOPROST 50 UG/ML
1 SOLUTION/ DROPS OPHTHALMIC 2 TIMES DAILY
Status: DISCONTINUED | OUTPATIENT
Start: 2019-10-27 | End: 2019-10-27

## 2019-10-27 RX ORDER — FUROSEMIDE 10 MG/ML
40 INJECTION INTRAMUSCULAR; INTRAVENOUS ONCE
Status: COMPLETED | OUTPATIENT
Start: 2019-10-27 | End: 2019-10-27

## 2019-10-27 RX ORDER — CARVEDILOL 12.5 MG/1
12.5 TABLET ORAL 2 TIMES DAILY WITH MEALS
Status: DISCONTINUED | OUTPATIENT
Start: 2019-10-27 | End: 2019-10-30

## 2019-10-27 RX ORDER — HYDRALAZINE HYDROCHLORIDE 100 MG/1
100 TABLET, FILM COATED ORAL EVERY 8 HOURS SCHEDULED
Status: DISCONTINUED | OUTPATIENT
Start: 2019-10-27 | End: 2019-10-28

## 2019-10-27 RX ORDER — TRAMADOL HYDROCHLORIDE 50 MG/1
50 TABLET ORAL EVERY 12 HOURS PRN
Status: DISCONTINUED | OUTPATIENT
Start: 2019-10-27 | End: 2019-10-30

## 2019-10-27 RX ORDER — DORZOLAMIDE HYDROCHLORIDE AND TIMOLOL MALEATE 20; 5 MG/ML; MG/ML
1 SOLUTION/ DROPS OPHTHALMIC 2 TIMES DAILY
Status: DISCONTINUED | OUTPATIENT
Start: 2019-10-27 | End: 2019-10-30

## 2019-10-27 RX ORDER — FUROSEMIDE 10 MG/ML
40 INJECTION INTRAMUSCULAR; INTRAVENOUS
Status: DISCONTINUED | OUTPATIENT
Start: 2019-10-27 | End: 2019-10-29

## 2019-10-27 NOTE — ED PROVIDER NOTES
Patient Seen in: [facility] AND [facility] Emergency Department      History   Patient presents with:  Swelling Edema (cardiovascular, metabolic)    Stated Complaint: chf exacerbation    HPI    71-year-old female presents for complaint of leg swelling and shortn Resp 16   Ht 154.9 cm (5' 1\")   Wt 63.5 kg   SpO2 96%   BMI 26.45 kg/m²         Physical Exam  Vitals signs and nursing note reviewed. Constitutional:       Appearance: She is well-developed. HENT:      Head: Normocephalic and atraumatic.       Mouth/ within normal limits   CBC W/ DIFFERENTIAL - Abnormal; Notable for the following components:    WBC 15.1 (*)     HGB 11.4 (*)     MCHC 30.8 (*)     Neutrophil Absolute Prelim 12.29 (*)     Neutrophil Absolute 12.29 (*)     All other components within celine pulmonary parenchymal abnormalities. No effusion or pleural thickening. BONES: DJD bilateral shoulders. OTHER: Negative. CONCLUSION:  1. No acute findings.     Dictated by (CST): Torie Fountain MD on 10/27/2019 at 13:51     Approved by (C

## 2019-10-27 NOTE — PROGRESS NOTES
[facility] Pharmacy Note:  Renal Dose Adjustment for Tramadol Corinna Lawson    Felix House has been prescribed Tramadol (ULTRAM) 50 mg orally every 6 hours as needed for pain. Estimated Creatinine Clearance: 20.2 mL/min (A) (based on SCr of 1.62 mg/dL (H)).     H

## 2019-10-27 NOTE — CONSULTS
[facility]  Report of Consultation    Emmanuel King Patient Status:  Emergency    1935 MRN [id number]   Location [address] Attending [address]  Tiana Menjivar Day # 0 PCP [facility] • Essential hypertension    • Glaucoma    • Gout    • High blood pressure    • Hyperlipidemia    • Mitral regurgitation    • Obstructive airway disease (HCC)    • Reactive airway disease    • Renal disorder     CKD stage 3   • Stroke [facility])    • Thyroid d Without clubbing, cyanosis or edema. Peripheral pulses are 2+. Neurologic: Alert and oriented, normal affect. Skin: Warm and dry.      Laboratories and Data:  Diagnostics:    Labs:   Lab Results   Component Value Date    WBC 15.1 10/27/2019    RBC 4.16 1

## 2019-10-28 ENCOUNTER — APPOINTMENT (OUTPATIENT)
Dept: GENERAL RADIOLOGY | Facility: HOSPITAL | Age: 84
DRG: 291 | End: 2019-10-28
Attending: INTERNAL MEDICINE
Payer: MEDICARE

## 2019-10-28 PROCEDURE — 99232 SBSQ HOSP IP/OBS MODERATE 35: CPT | Performed by: INTERNAL MEDICINE

## 2019-10-28 PROCEDURE — 73560 X-RAY EXAM OF KNEE 1 OR 2: CPT | Performed by: INTERNAL MEDICINE

## 2019-10-28 PROCEDURE — 80053 COMPREHEN METABOLIC PANEL: CPT | Performed by: FAMILY MEDICINE

## 2019-10-28 PROCEDURE — 85025 COMPLETE CBC W/AUTO DIFF WBC: CPT | Performed by: FAMILY MEDICINE

## 2019-10-28 PROCEDURE — 82962 GLUCOSE BLOOD TEST: CPT

## 2019-10-28 RX ORDER — ACETAMINOPHEN 325 MG/1
650 TABLET ORAL EVERY 6 HOURS PRN
Status: DISCONTINUED | OUTPATIENT
Start: 2019-10-28 | End: 2019-10-30

## 2019-10-28 RX ORDER — HYDRALAZINE HYDROCHLORIDE 50 MG/1
50 TABLET, FILM COATED ORAL EVERY 8 HOURS SCHEDULED
Status: DISCONTINUED | OUTPATIENT
Start: 2019-10-28 | End: 2019-10-30

## 2019-10-28 NOTE — H&P
[facility]    PATIENT'S NAME: Suellen Walter   ATTENDING PHYSICIAN: Jamila Andrews.  Anmol Ponce MD   PATIENT ACCOUNT#:   [id number]    LOCATION:  [address] [place] [address] [address] #:   [id number]       YOB: 1935  ADMISSION DATE:       10/27/2019 grant. SOCIAL HISTORY:  Habits:  Patient did smoke in the past, but quit almost 50 years ago. She does not drink alcohol or take illicit drugs. Patient is  and lives with her  in [place] [facility]. They have supportive family.     REVIEW OF SYS Glaucoma, on multiple medications. PLAN:  The patient has been admitted to the telemetry unit and a Cardiology consultation obtained. The patient has been given intravenous diuresis. We will need to monitor renal function closely.   We will have Ortho

## 2019-10-28 NOTE — CM/SW NOTE
Received MDO for d/c planning - HHC vs SNF. SW spoke to pt in her room. Per pt: lives in a double level home w/ her , dtr, and grand-daughter. Pt states there are approx 6 stairs in the home. Pt reports using a cane at home.  Pt also stated

## 2019-10-28 NOTE — PROGRESS NOTES
S: no c/o today    O:   Blood pressure 110/52, pulse 80, temperature 98.6 °F (37 °C), temperature source Oral, resp. rate 16, height 154.9 cm (5' 1\"), weight 145 lb 12.8 oz (66.1 kg), SpO2 94 %, not currently breastfeeding.        10/27/19  1629 10/27/19 spironolactone  25 mg Oral Daily   • Clopidogrel Bisulfate  75 mg Oral Daily   • Donepezil HCl  5 mg Oral Nightly   • Dorzolamide HCl-Timolol Mal  1 drop Both Eyes BID   • atorvastatin  20 mg Oral Nightly   • linagliptin  5 mg Oral Daily   • Insulin Aspart

## 2019-10-28 NOTE — HOME CARE LIAISON
10/28/19 Received referral from [address] [place] [address]. Met with patient and her family at the bedside. Family would like to think about it, will follow up again with patient and family on Tuesday 10/29/19. TAMRA Carey notified.      10/29/19:Update  Notified that teresa

## 2019-10-28 NOTE — PLAN OF CARE
Problem: Patient Centered Care  Goal: Patient preferences are identified and integrated in the patient's plan of care  Description  Interventions:  - What would you like us to know as we care for you? ***  - Provide timely, complete, and accurate informa stability  Description  INTERVENTIONS:  - Monitor vital signs, rhythm, and trends  - Monitor for bleeding, hypotension and signs of decreased cardiac output  - Evaluate effectiveness of vasoactive medications to optimize hemodynamic stability  - Monitor ar needed  - Reorient patient post seizure  - Seizure pads on all 4 side rails  - Instruct patient/family to notify RN of any seizure activity  - Instruct patient/family to call for assistance with activity based on assessment  Outcome: Progressing  Goal: Ach

## 2019-10-28 NOTE — PLAN OF CARE
Problem: Patient Centered Care  Goal: Patient preferences are identified and integrated in the patient's plan of care  Description  Interventions:  - What would you like us to know as we care for you?  From home with family    - Provide timely, complete, Monitor arterial and/or venous puncture sites for bleeding and/or hematoma  - Assess quality of pulses, skin color and temperature  - Assess for signs of decreased coronary artery perfusion - ex.  Angina  - Evaluate fluid balance, assess for edema, trend we Progressing  Goal: Achieves maximal functionality and self care  Description  INTERVENTIONS  - Monitor swallowing and airway patency with patient fatigue and changes in neurological status  - Encourage and assist patient to increase activity and self care

## 2019-10-29 PROCEDURE — 82728 ASSAY OF FERRITIN: CPT | Performed by: INTERNAL MEDICINE

## 2019-10-29 PROCEDURE — 87070 CULTURE OTHR SPECIMN AEROBIC: CPT | Performed by: ORTHOPAEDIC SURGERY

## 2019-10-29 PROCEDURE — 85025 COMPLETE CBC W/AUTO DIFF WBC: CPT | Performed by: INTERNAL MEDICINE

## 2019-10-29 PROCEDURE — 83540 ASSAY OF IRON: CPT | Performed by: INTERNAL MEDICINE

## 2019-10-29 PROCEDURE — 84466 ASSAY OF TRANSFERRIN: CPT | Performed by: INTERNAL MEDICINE

## 2019-10-29 PROCEDURE — 87205 SMEAR GRAM STAIN: CPT | Performed by: ORTHOPAEDIC SURGERY

## 2019-10-29 PROCEDURE — 80048 BASIC METABOLIC PNL TOTAL CA: CPT | Performed by: INTERNAL MEDICINE

## 2019-10-29 PROCEDURE — 82962 GLUCOSE BLOOD TEST: CPT

## 2019-10-29 PROCEDURE — 84550 ASSAY OF BLOOD/URIC ACID: CPT | Performed by: INTERNAL MEDICINE

## 2019-10-29 PROCEDURE — 89051 BODY FLUID CELL COUNT: CPT | Performed by: ORTHOPAEDIC SURGERY

## 2019-10-29 PROCEDURE — 99232 SBSQ HOSP IP/OBS MODERATE 35: CPT | Performed by: INTERNAL MEDICINE

## 2019-10-29 PROCEDURE — 89050 BODY FLUID CELL COUNT: CPT | Performed by: ORTHOPAEDIC SURGERY

## 2019-10-29 PROCEDURE — 89060 EXAM SYNOVIAL FLUID CRYSTALS: CPT | Performed by: ORTHOPAEDIC SURGERY

## 2019-10-29 PROCEDURE — 87206 SMEAR FLUORESCENT/ACID STAI: CPT | Performed by: ORTHOPAEDIC SURGERY

## 2019-10-29 PROCEDURE — 87102 FUNGUS ISOLATION CULTURE: CPT | Performed by: ORTHOPAEDIC SURGERY

## 2019-10-29 RX ORDER — BUPIVACAINE HYDROCHLORIDE 2.5 MG/ML
15 INJECTION, SOLUTION EPIDURAL; INFILTRATION; INTRACAUDAL ONCE
Status: COMPLETED | OUTPATIENT
Start: 2019-10-29 | End: 2019-10-29

## 2019-10-29 RX ORDER — COLCHICINE 0.6 MG/1
0.6 TABLET ORAL DAILY
Status: DISCONTINUED | OUTPATIENT
Start: 2019-10-29 | End: 2019-10-30

## 2019-10-29 RX ORDER — METHYLPREDNISOLONE ACETATE 80 MG/ML
80 INJECTION, SUSPENSION INTRA-ARTICULAR; INTRALESIONAL; INTRAMUSCULAR; SOFT TISSUE ONCE
Status: COMPLETED | OUTPATIENT
Start: 2019-10-29 | End: 2019-10-29

## 2019-10-29 RX ORDER — BUPIVACAINE HYDROCHLORIDE 2.5 MG/ML
15 INJECTION, SOLUTION EPIDURAL; INFILTRATION; INTRACAUDAL ONCE
Status: DISCONTINUED | OUTPATIENT
Start: 2019-10-30 | End: 2019-10-29

## 2019-10-29 RX ORDER — TORSEMIDE 20 MG/1
20 TABLET ORAL
Status: DISCONTINUED | OUTPATIENT
Start: 2019-10-29 | End: 2019-10-30

## 2019-10-29 RX ORDER — ALLOPURINOL 100 MG/1
100 TABLET ORAL DAILY
Status: DISCONTINUED | OUTPATIENT
Start: 2019-10-29 | End: 2019-10-30

## 2019-10-29 RX ORDER — HYDRALAZINE HYDROCHLORIDE 50 MG/1
50 TABLET, FILM COATED ORAL EVERY 8 HOURS SCHEDULED
Qty: 90 TABLET | Refills: 2 | Status: ON HOLD | OUTPATIENT
Start: 2019-10-29 | End: 2020-05-20

## 2019-10-29 RX ORDER — ISOSORBIDE DINITRATE 40 MG/1
40 TABLET ORAL
Qty: 90 TABLET | Refills: 2 | Status: ON HOLD | OUTPATIENT
Start: 2019-10-29 | End: 2020-01-31

## 2019-10-29 NOTE — CM/SW NOTE
Received MDO for d/c planning - per MD's comments: pt's family agreeable to [place] [id number] services. SW updated Melissa w/ [facility]. Need HHC orders and F2F prior to d/c.     PLAN: [facility], need [place] [id number] orders, need F2F    SW/CM to remain available for support and/or discharge

## 2019-10-29 NOTE — PROGRESS NOTES
[facility]    Progress Note    Lianna Mercado Patient Status:  Inpatient    1935 MRN [id number]   Location [facility] 3W/SW Attending Michoacano Browne MD   Hosp Day # 2 PCP Edda Joshi MD     Date of Admission:  10/27/2019 Oral, Daily  Donepezil HCl (ARICEPT) tab 5 mg, 5 mg, Oral, Nightly  Dorzolamide HCl-Timolol Mal (Dorzolamide-Timolol) 22.3-6.8 MG/ML ophthalmic solution 1 drop, 1 drop, Both Eyes, BID  atorvastatin (LIPITOR) tab 20 mg, 20 mg, Oral, Nightly  linagliptin (TR Temp 98.4 °F (36.9 °C) (Oral)   Resp 20   Ht 61\"   Wt 147 lb (66.7 kg)   SpO2 97%   BMI 27.78 kg/m²   She is alert, oriented, and appropriate throughout the examination and in no apparent distress. Vital signs are as enumerated above.   Physical examinat at 13:52           Ekg 12-lead    Result Date: 10/27/2019  ECG Report  Interpretation  -------------------------- Sinus Rhythm -with ectopic ventricular couplets -Right bundle branch block with left axis -bifascicular block.   -Inferior infarct -rayna Costa Strong peripheral pulses

## 2019-10-29 NOTE — PROGRESS NOTES
S: no c/o today    O:   Blood pressure 132/63, pulse 80, temperature 98 °F (36.7 °C), temperature source Oral, resp. rate 16, height 154.9 cm (5' 1\"), weight 147 lb (66.7 kg), SpO2 99 %, not currently breastfeeding.        10/28/19  2140 10/29/19  4160 10/ 5 mg Oral Nightly   • Dorzolamide HCl-Timolol Mal  1 drop Both Eyes BID   • atorvastatin  20 mg Oral Nightly   • linagliptin  5 mg Oral Daily   • Insulin Aspart Pen  1-5 Units Subcutaneous TID CC   • latanoprost  1 drop Both Eyes Nightly           Patient

## 2019-10-29 NOTE — PROGRESS NOTES
[facility]    Progress Note    Scott Álvarez Patient Status:  Inpatient    1935 MRN [id number]   Location [facility] 3W/SW Attending Niyah Gonzales MD   Hosp Day # 2 PCP Nadeen Ceron MD       Subjective:    Scott Álvarez i 110* 141* 118*   BUN 31* 36* 40*   CREATSERUM 1.62* 1.70* 1.86*   GFRAA 34* 32* 29*   GFRNAA 29* 28* 25*   CA 9.0 8.5 8.7   ALB  --  2.7*  --     142 137   K 4.3 4.2 3.9    108 104   CO2 26.0 26.0 28.0   ALKPHO  --  83  --    AST  --  14*  -- m2  The accuracy of the MDRD equation is not suitable  for acute renal failure patients and it is not  recommended for use with pregnant women.      06/10/2014 44 (L) >60 Final     Comment:     Chronic Kidney Disease: GFR <60 ml/min/1.73 m2  Kidney failure: BLOOD COUNT (L)   Date Value Ref Range Status   12/27/2014 7.4 4.0 - 11.0 K/UL Final   12/17/2014 8.3 4.0 - 11.0 K/UL Final   12/09/2014 6.6 4.0 - 11.0 K/UL Final   10/07/2014 6.0 4.0 - 11.0 K/UL Final   06/10/2014 6.5 4.0 - 11.0 K/UL Final   04/08/2014 7. • methylPREDNISolone acetate  80 mg Intramuscular Once   • bupivacaine (PF)  15 mL Injection Once   • hydrALAzine HCl  50 mg Oral Q8H [address]   • furosemide  40 mg Intravenous BID (Diuretic)   • carvedilol  12.5 mg Oral BID with meals   • isosorbide dinitrat Estuardo Joshi MD  10/29/2019

## 2019-10-29 NOTE — CARDIAC REHAB
Met with patient for cardiac rehab follow-up. Questions answered. No need for further intervention at this time. Will continue to monitor patient needs regarding education during this admission.

## 2019-10-29 NOTE — CONSULTS
[facility]    PATIENT'S NAME: Radha Sachin   ATTENDING PHYSICIAN: Alpha Alpers.  Jakob Hough MD   CONSULTING PHYSICIAN: Navin Hernandez MD   PATIENT ACCOUNT#:   [de-identified]    LOCATION:  [address] [place] [address] [address] #:   [id number]       DATE OF BIRTH: has back-related problems and Pain [facility] epidural injections.     MEDICATIONS:  Medications on admission:  Albuterol inhaler, Combigan ophthalmologic solution, Coreg, Plavix, Aricept, additional ophthalmology drops, glipizide, hydralazine, Isordil, Xalatan (normal range 2.6 to 6.0). Ultrasound venous Doppler, bilateral lower extremity (October 27, 2019), had the following conclusion:  Normal examination.       X-rays of left knee, AP and lateral, (October 27, 2019), advanced tricompartmental osteoarthriti

## 2019-10-30 ENCOUNTER — TELEPHONE (OUTPATIENT)
Dept: CASE MANAGEMENT | Age: 84
End: 2019-10-30

## 2019-10-30 VITALS
WEIGHT: 133.81 LBS | RESPIRATION RATE: 18 BRPM | TEMPERATURE: 98 F | HEART RATE: 70 BPM | DIASTOLIC BLOOD PRESSURE: 78 MMHG | SYSTOLIC BLOOD PRESSURE: 121 MMHG | BODY MASS INDEX: 25.27 KG/M2 | HEIGHT: 61 IN | OXYGEN SATURATION: 98 %

## 2019-10-30 PROCEDURE — 97166 OT EVAL MOD COMPLEX 45 MIN: CPT

## 2019-10-30 PROCEDURE — 99232 SBSQ HOSP IP/OBS MODERATE 35: CPT | Performed by: INTERNAL MEDICINE

## 2019-10-30 PROCEDURE — 97162 PT EVAL MOD COMPLEX 30 MIN: CPT

## 2019-10-30 PROCEDURE — 97535 SELF CARE MNGMENT TRAINING: CPT

## 2019-10-30 PROCEDURE — 97530 THERAPEUTIC ACTIVITIES: CPT

## 2019-10-30 PROCEDURE — 82962 GLUCOSE BLOOD TEST: CPT

## 2019-10-30 PROCEDURE — 80048 BASIC METABOLIC PNL TOTAL CA: CPT | Performed by: INTERNAL MEDICINE

## 2019-10-30 RX ORDER — ALLOPURINOL 100 MG/1
100 TABLET ORAL DAILY
Qty: 90 TABLET | Refills: 1 | Status: ON HOLD | OUTPATIENT
Start: 2019-10-31 | End: 2020-05-21

## 2019-10-30 RX ORDER — TRAMADOL HYDROCHLORIDE 50 MG/1
50 TABLET ORAL EVERY 12 HOURS PRN
Refills: 0 | Status: ON HOLD | COMMUNITY
Start: 2019-10-30 | End: 2020-04-16

## 2019-10-30 RX ORDER — COLCHICINE 0.6 MG/1
0.6 TABLET ORAL DAILY
Qty: 30 TABLET | Refills: 1 | Status: ON HOLD | OUTPATIENT
Start: 2019-10-31 | End: 2020-05-21

## 2019-10-30 NOTE — OCCUPATIONAL THERAPY NOTE
OCCUPATIONAL THERAPY EVALUATION - INPATIENT     Room Number: 336/336-A  Evaluation Date: 10/30/2019  Type of Evaluation: Initial  Presenting Problem: (CHF)    Physician Order: IP Consult to Occupational Therapy  Reason for Therapy: ADL/IADL Dysfunction and Cardiomyopathy [facility])    • Carpal tunnel syndrome    • CHF (congestive heart failure) (HCC)    • [facility] ([facility] 11/6/14   • Ejection fraction < 50%    • Esophageal reflux    • Essential hypertension    • Glaucoma    • Gout    • High blood pr Form  How much help from another person does the patient currently need…  -   Putting on and taking off regular lower body clothing?: A Lot  -   Bathing (including washing, rinsing, drying)?: A Lot  -   Toileting, which includes using toilet, bedpan or uri

## 2019-10-30 NOTE — PHYSICAL THERAPY NOTE
PHYSICAL THERAPY EVALUATION - INPATIENT     Room Number: 336/336-A  Evaluation Date: 10/30/2019  Type of Evaluation: Initial   Physician Order: PT Eval and Treat    Presenting Problem: Acute on chronic CHF, knee pain  Reason for Therapy: Mobility Dysfunct patient back to bed. The patient's Approx Degree of Impairment: 72.57% has been calculated based on documentation in the [facility] '6 clicks' Inpatient Basic Mobility Short Form.   Research supports that patients with this level of impairment may benefit fr 7  Railing: Yes          Lives With: Spouse; Family  Drives: No  Patient Owned Equipment: Rolling walker;Cane  Patient Regularly Uses: None    Prior Level of [place]: Ind in ADL's- ambulates intermittently with cane.  Dtr and tomasdtr assist with housekeep sitting on the side of the bed?: A Lot   How much help from another person does the patient currently need. ..   -   Moving to and from a bed to a chair (including a wheelchair)?: A Lot   -   Need to walk in hospital room?: A Lot   -   Climbing 3-5 steps wi

## 2019-10-30 NOTE — CM/SW NOTE
10: 55AM  Received MDO for POLST.     SW provided from and spoke w/ pt's dtr the other day about DNR/POLST - unsure if they wanted to complete while pt in hospital.    SW went to check pt's chart this AM - POLST not there, pt's dtr also not in the room at th

## 2019-10-30 NOTE — PLAN OF CARE
Problem: Patient Centered Care  Goal: Patient preferences are identified and integrated in the patient's plan of care  Description  Interventions:  - What would you like us to know as we care for you?  From home with family    - Provide timely, complete, medications to optimize hemodynamic stability  - Monitor arterial and/or venous puncture sites for bleeding and/or hematoma  - Assess quality of pulses, skin color and temperature  - Assess for signs of decreased coronary artery perfusion - ex.  Angina  - E patient/family to call for assistance with activity based on assessment  Outcome: Adequate for Discharge  Goal: Achieves maximal functionality and self care  Description  INTERVENTIONS  - Monitor swallowing and airway patency with patient fatigue and jason

## 2019-10-30 NOTE — PROGRESS NOTES
S: c/o L knee pain     O:   Blood pressure 109/87, pulse 74, temperature 98.4 °F (36.9 °C), temperature source Oral, resp. rate 18, height 154.9 cm (5' 1\"), weight 133 lb 12.8 oz (60.7 kg), SpO2 98 %, not currently breastfeeding.        10/29/19  2112 10/2 Nightly   • Dorzolamide HCl-Timolol Mal  1 drop Both Eyes BID   • atorvastatin  20 mg Oral Nightly   • linagliptin  5 mg Oral Daily   • Insulin Aspart Pen  1-5 Units Subcutaneous TID CC   • latanoprost  1 drop Both Eyes Nightly           Patient Active Pro

## 2019-10-30 NOTE — PROGRESS NOTES
[place] [facility] - [facility]    Progress Note    Chad Dodd Patient Status:  Inpatient    1935 MRN [id number]   Location [facility] 3W/SW Attending Allison Chow MD   Hosp Day # 3 PCP Patti Tran MD     Date of Admission:  10/27/2019 40 mg, 40 mg, Oral, TID (Nitrates)  spironolactone (ALDACTONE) tab 25 mg, 25 mg, Oral, Daily  Clopidogrel Bisulfate (PLAVIX) tab 75 mg, 75 mg, Oral, Daily  Donepezil HCl (ARICEPT) tab 5 mg, 5 mg, Oral, Nightly  Dorzolamide HCl-Timolol Mal (Dorzolamide-Angel 44.2      Smokeless tobacco: Never Used    Alcohol use: No    Drug use: No       PHYSICAL EXAM:   /57 (BP Location: Right arm)   Pulse 71   Temp 98.2 °F (36.8 °C) (Oral)   Resp 18   Ht 61\"   Wt 133 lb 12.8 oz (60.7 kg)   SpO2 98%   BMI 25.28 kg/m² (CST): Ulysses Malay, MD on 10/28/2019 at 11:27                 ASSESSMENT AND PLAN:   Left knee acute inflammatory arthropathy secondary to gout  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

## 2019-10-31 ENCOUNTER — TELEPHONE (OUTPATIENT)
Dept: CASE MANAGEMENT | Age: 84
End: 2019-10-31

## 2019-12-09 NOTE — DISCHARGE SUMMARY
[facility]    PATIENT'S NAME: Tierra MCMILLAN   ATTENDING PHYSICIAN: Stevie Haro MD   PATIENT ACCOUNT#:   [id number]    LOCATION:  [address] [place] [address] [address] #:   [id number]       YOB: 1935  ADMISSION DATE:       10/27/201 and injection of left knee by Dr. Elizabet Turner on 10/29/2019. DISCHARGE DIET, ACTIVITY, MEDICATIONS:  See discharge instructions. ARRANGEMENT FOR FUTURE CARE:  Patient will be followed by Dr. Elizabet Turner and Dr. Braxton Smiley as recommended.   Will see Dr. Nicho Liriano

## 2020-01-31 ENCOUNTER — APPOINTMENT (OUTPATIENT)
Dept: CT IMAGING | Facility: HOSPITAL | Age: 85
DRG: 683 | End: 2020-01-31
Attending: EMERGENCY MEDICINE
Payer: MEDICARE

## 2020-01-31 ENCOUNTER — HOSPITAL ENCOUNTER (INPATIENT)
Facility: HOSPITAL | Age: 85
LOS: 7 days | Discharge: HOME HEALTH | DRG: 683 | End: 2020-02-07
Attending: EMERGENCY MEDICINE | Admitting: INTERNAL MEDICINE
Payer: MEDICARE

## 2020-01-31 ENCOUNTER — APPOINTMENT (OUTPATIENT)
Dept: GENERAL RADIOLOGY | Facility: HOSPITAL | Age: 85
DRG: 683 | End: 2020-01-31
Attending: EMERGENCY MEDICINE
Payer: MEDICARE

## 2020-01-31 DIAGNOSIS — N17.9 ACUTE RENAL FAILURE SUPERIMPOSED ON CHRONIC KIDNEY DISEASE, UNSPECIFIED CKD STAGE, UNSPECIFIED ACUTE RENAL FAILURE TYPE (HCC): Primary | ICD-10-CM

## 2020-01-31 DIAGNOSIS — N18.9 ACUTE RENAL FAILURE SUPERIMPOSED ON CHRONIC KIDNEY DISEASE, UNSPECIFIED CKD STAGE, UNSPECIFIED ACUTE RENAL FAILURE TYPE (HCC): Primary | ICD-10-CM

## 2020-01-31 DIAGNOSIS — E87.5 HYPERKALEMIA: ICD-10-CM

## 2020-01-31 DIAGNOSIS — D72.829 LEUKOCYTOSIS, UNSPECIFIED TYPE: ICD-10-CM

## 2020-01-31 DIAGNOSIS — D64.9 ANEMIA, UNSPECIFIED TYPE: ICD-10-CM

## 2020-01-31 LAB
ANION GAP SERPL CALC-SCNC: 6 MMOL/L (ref 0–18)
BASOPHILS # BLD: 0 X10(3) UL (ref 0–0.2)
BASOPHILS NFR BLD: 0 %
BILIRUB UR QL: NEGATIVE
BUN BLD-MCNC: 103 MG/DL (ref 7–18)
BUN/CREAT SERPL: 36 (ref 10–20)
CALCIUM BLD-MCNC: 10.3 MG/DL (ref 8.5–10.1)
CHLORIDE SERPL-SCNC: 104 MMOL/L (ref 98–112)
CO2 SERPL-SCNC: 24 MMOL/L (ref 21–32)
COLOR UR: YELLOW
CREAT BLD-MCNC: 2.86 MG/DL (ref 0.55–1.02)
DEPRECATED RDW RBC AUTO: 47.8 FL (ref 35.1–46.3)
EOSINOPHIL # BLD: 0.67 X10(3) UL (ref 0–0.7)
EOSINOPHIL NFR BLD: 3 %
ERYTHROCYTE [DISTWIDTH] IN BLOOD BY AUTOMATED COUNT: 15.5 % (ref 11–15)
EST. AVERAGE GLUCOSE BLD GHB EST-MCNC: 137 MG/DL (ref 68–126)
FLUAV + FLUBV RNA SPEC NAA+PROBE: NEGATIVE
GLUCOSE BLD-MCNC: 297 MG/DL (ref 70–99)
GLUCOSE BLDC GLUCOMTR-MCNC: 280 MG/DL (ref 70–99)
GLUCOSE BLDC GLUCOMTR-MCNC: 411 MG/DL (ref 70–99)
GLUCOSE BLDC GLUCOMTR-MCNC: 414 MG/DL (ref 70–99)
GLUCOSE BLDC GLUCOMTR-MCNC: 434 MG/DL (ref 70–99)
GLUCOSE UR-MCNC: 50 MG/DL
HBA1C MFR BLD HPLC: 6.4 % (ref ?–5.7)
HCT VFR BLD AUTO: 27.6 % (ref 35–48)
HGB BLD-MCNC: 9.1 G/DL (ref 12–16)
HGB UR QL STRIP.AUTO: NEGATIVE
KETONES UR-MCNC: NEGATIVE MG/DL
LEUKOCYTE ESTERASE UR QL STRIP.AUTO: NEGATIVE
LYMPHOCYTES NFR BLD: 2 X10(3) UL (ref 1–4)
LYMPHOCYTES NFR BLD: 9 %
MCH RBC QN AUTO: 28.3 PG (ref 26–34)
MCHC RBC AUTO-ENTMCNC: 33 G/DL (ref 31–37)
MCV RBC AUTO: 85.7 FL (ref 80–100)
MONOCYTES # BLD: 1.33 X10(3) UL (ref 0.1–1)
MONOCYTES NFR BLD: 6 %
MORPHOLOGY: NORMAL
NEUTROPHILS # BLD AUTO: 18.74 X10 (3) UL (ref 1.5–7.7)
NEUTROPHILS NFR BLD: 80 %
NEUTS BAND NFR BLD: 2 %
NEUTS HYPERSEG # BLD: 18.2 X10(3) UL (ref 1.5–7.7)
NITRITE UR QL STRIP.AUTO: NEGATIVE
OSMOLALITY SERPL CALC.SUM OF ELEC: 321 MOSM/KG (ref 275–295)
PH UR: 5 [PH] (ref 5–8)
PLATELET # BLD AUTO: 433 10(3)UL (ref 150–450)
PLATELET MORPHOLOGY: NORMAL
POTASSIUM SERPL-SCNC: 5.4 MMOL/L (ref 3.5–5.1)
PROT UR-MCNC: NEGATIVE MG/DL
RBC # BLD AUTO: 3.22 X10(6)UL (ref 3.8–5.3)
SODIUM SERPL-SCNC: 134 MMOL/L (ref 136–145)
SP GR UR STRIP: 1.01 (ref 1–1.03)
TOTAL CELLS COUNTED: 100
UROBILINOGEN UR STRIP-ACNC: <2
WBC # BLD AUTO: 22.2 X10(3) UL (ref 4–11)

## 2020-01-31 PROCEDURE — 71250 CT THORAX DX C-: CPT | Performed by: EMERGENCY MEDICINE

## 2020-01-31 PROCEDURE — 71045 X-RAY EXAM CHEST 1 VIEW: CPT | Performed by: EMERGENCY MEDICINE

## 2020-01-31 RX ORDER — HEPARIN SODIUM 5000 [USP'U]/ML
5000 INJECTION, SOLUTION INTRAVENOUS; SUBCUTANEOUS EVERY 12 HOURS SCHEDULED
Status: DISCONTINUED | OUTPATIENT
Start: 2020-01-31 | End: 2020-02-07

## 2020-01-31 RX ORDER — BRIMONIDINE TARTRATE 2 MG/ML
1 SOLUTION/ DROPS OPHTHALMIC 3 TIMES DAILY
Status: DISCONTINUED | OUTPATIENT
Start: 2020-01-31 | End: 2020-02-07

## 2020-01-31 RX ORDER — ALBUTEROL SULFATE 90 UG/1
2 AEROSOL, METERED RESPIRATORY (INHALATION) EVERY 4 HOURS PRN
Status: DISCONTINUED | OUTPATIENT
Start: 2020-01-31 | End: 2020-02-07

## 2020-01-31 RX ORDER — COLCHICINE 0.6 MG/1
0.6 TABLET ORAL DAILY
Status: DISCONTINUED | OUTPATIENT
Start: 2020-01-31 | End: 2020-02-07

## 2020-01-31 RX ORDER — ALLOPURINOL 100 MG/1
100 TABLET ORAL DAILY
Status: DISCONTINUED | OUTPATIENT
Start: 2020-01-31 | End: 2020-02-07

## 2020-01-31 RX ORDER — ATORVASTATIN CALCIUM 20 MG/1
20 TABLET, FILM COATED ORAL NIGHTLY
Status: DISCONTINUED | OUTPATIENT
Start: 2020-01-31 | End: 2020-02-07

## 2020-01-31 RX ORDER — DONEPEZIL HYDROCHLORIDE 5 MG/1
5 TABLET, FILM COATED ORAL NIGHTLY
Status: DISCONTINUED | OUTPATIENT
Start: 2020-01-31 | End: 2020-02-07

## 2020-01-31 RX ORDER — CARVEDILOL 25 MG/1
25 TABLET ORAL 2 TIMES DAILY WITH MEALS
Status: DISCONTINUED | OUTPATIENT
Start: 2020-01-31 | End: 2020-02-07

## 2020-01-31 RX ORDER — LATANOPROST 50 UG/ML
1 SOLUTION/ DROPS OPHTHALMIC 2 TIMES DAILY
Status: DISCONTINUED | OUTPATIENT
Start: 2020-01-31 | End: 2020-02-07

## 2020-01-31 RX ORDER — SODIUM POLYSTYRENE SULFONATE 4.1 MEQ/G
15 POWDER, FOR SUSPENSION ORAL; RECTAL ONCE
Status: COMPLETED | OUTPATIENT
Start: 2020-01-31 | End: 2020-01-31

## 2020-01-31 RX ORDER — DORZOLAMIDE HYDROCHLORIDE AND TIMOLOL MALEATE 20; 5 MG/ML; MG/ML
1 SOLUTION/ DROPS OPHTHALMIC 2 TIMES DAILY
Status: DISCONTINUED | OUTPATIENT
Start: 2020-01-31 | End: 2020-02-07

## 2020-01-31 RX ORDER — DEXTROSE MONOHYDRATE 25 G/50ML
50 INJECTION, SOLUTION INTRAVENOUS AS NEEDED
Status: DISCONTINUED | OUTPATIENT
Start: 2020-01-31 | End: 2020-02-02

## 2020-01-31 RX ORDER — TRAMADOL HYDROCHLORIDE 50 MG/1
50 TABLET ORAL EVERY 12 HOURS PRN
Status: DISCONTINUED | OUTPATIENT
Start: 2020-01-31 | End: 2020-02-07

## 2020-01-31 RX ORDER — HYDRALAZINE HYDROCHLORIDE 100 MG/1
100 TABLET, FILM COATED ORAL EVERY 8 HOURS SCHEDULED
Status: DISCONTINUED | OUTPATIENT
Start: 2020-01-31 | End: 2020-02-07

## 2020-01-31 RX ORDER — MORPHINE SULFATE 4 MG/ML
2 INJECTION, SOLUTION INTRAMUSCULAR; INTRAVENOUS ONCE
Status: COMPLETED | OUTPATIENT
Start: 2020-01-31 | End: 2020-01-31

## 2020-01-31 RX ORDER — SODIUM CHLORIDE 9 MG/ML
INJECTION, SOLUTION INTRAVENOUS CONTINUOUS
Status: DISCONTINUED | OUTPATIENT
Start: 2020-01-31 | End: 2020-02-02

## 2020-01-31 RX ORDER — DEXTROSE MONOHYDRATE 25 G/50ML
50 INJECTION, SOLUTION INTRAVENOUS ONCE
Status: COMPLETED | OUTPATIENT
Start: 2020-01-31 | End: 2020-01-31

## 2020-01-31 RX ORDER — SODIUM CHLORIDE 0.9 % (FLUSH) 0.9 %
3 SYRINGE (ML) INJECTION AS NEEDED
Status: DISCONTINUED | OUTPATIENT
Start: 2020-01-31 | End: 2020-02-07

## 2020-01-31 NOTE — ED INITIAL ASSESSMENT (HPI)
Pt presents to ED with daughter who states pt lives at home with  and other family members. Per family pt is altered at baseline, AAOx 1-2 with dementia, presents to ED today due to increased malaise and fatigue noted per family.  pts daughter states

## 2020-01-31 NOTE — ED NOTES
Orders for admission, patient is aware of plan and ready to go upstairs.  Any questions, please call ED RN Julissa Cherry at extension [phone]

## 2020-01-31 NOTE — ED PROVIDER NOTES
Patient Seen in: [facility] Emergency Department      History   Patient presents with:  Altered Mental Status    Stated Complaint:     HPI    82-year-old female with history of hypertension, diabetes, coronary artery disease, cardiomyopathy, conge use: No    Drug use: No             Review of Systems    Positive for stated complaint:   Other systems are as noted in HPI. Constitutional and vital signs reviewed. All other systems reviewed and negative except as noted above.     Physical Exam Abnormal; Notable for the following components:    Neutrophil Absolute Manual 18.20 (*)     Monocyte Absolute Manual 1.33 (*)     All other components within normal limits   REDRAW BMP - Abnormal; Notable for the following components:    Potassium 5.4 (*) type  Anemia, unspecified type  Hyperkalemia    Disposition:  Admit  1/31/2020  2:12 pm    Follow-up:  No follow-up provider specified.   We recommend that you schedule follow up care with a primary care provider within the next three months to obtain basic

## 2020-01-31 NOTE — H&P
[facility]    PATIENT'S NAME: Suellen MCMILLAN   ATTENDING PHYSICIAN: Stevie Bradley MD   PATIENT ACCOUNT#:   [id number]    LOCATION:  [address] [place], [place] [address]r  #:   [id number]       YOB: 1935  ADMISSION DATE:       01/31/20 HISTORY:  Patient is , [place] her  [place]. She does not smoke, drink, or take illicit drugs. REVIEW OF SYSTEMS:  Difficult to obtain in patient's current status.       PHYSICAL EXAMINATION:    GENERAL:  Patient is an elderly black f Hypercholesterolemia. 10.   Glaucoma. PLAN:  Patient is being admitted to the general surgical floor and a Nephrology consultation obtained. The patient will be given some intravenous fluids, but will monitor for heart failure.   We will attempt physi

## 2020-02-01 LAB
ALBUMIN SERPL-MCNC: 1.8 G/DL (ref 3.4–5)
ALBUMIN/GLOB SERPL: 0.3 {RATIO} (ref 1–2)
ALP LIVER SERPL-CCNC: 132 U/L (ref 55–142)
ALT SERPL-CCNC: 21 U/L (ref 13–56)
ANION GAP SERPL CALC-SCNC: 8 MMOL/L (ref 0–18)
AST SERPL-CCNC: 38 U/L (ref 15–37)
BASOPHILS # BLD: 0 X10(3) UL (ref 0–0.2)
BASOPHILS NFR BLD: 0 %
BILIRUB SERPL-MCNC: 0.4 MG/DL (ref 0.1–2)
BUN BLD-MCNC: 102 MG/DL (ref 7–18)
BUN/CREAT SERPL: 44.9 (ref 10–20)
CALCIUM BLD-MCNC: 9 MG/DL (ref 8.5–10.1)
CHLORIDE SERPL-SCNC: 109 MMOL/L (ref 98–112)
CO2 SERPL-SCNC: 22 MMOL/L (ref 21–32)
CREAT BLD-MCNC: 2.27 MG/DL (ref 0.55–1.02)
DEPRECATED RDW RBC AUTO: 49.6 FL (ref 35.1–46.3)
EOSINOPHIL # BLD: 0.85 X10(3) UL (ref 0–0.7)
EOSINOPHIL NFR BLD: 4 %
ERYTHROCYTE [DISTWIDTH] IN BLOOD BY AUTOMATED COUNT: 15.9 % (ref 11–15)
GLOBULIN PLAS-MCNC: 5.2 G/DL (ref 2.8–4.4)
GLUCOSE BLD-MCNC: 289 MG/DL (ref 70–99)
GLUCOSE BLDC GLUCOMTR-MCNC: 234 MG/DL (ref 70–99)
GLUCOSE BLDC GLUCOMTR-MCNC: 276 MG/DL (ref 70–99)
GLUCOSE BLDC GLUCOMTR-MCNC: 288 MG/DL (ref 70–99)
GLUCOSE BLDC GLUCOMTR-MCNC: 298 MG/DL (ref 70–99)
GLUCOSE BLDC GLUCOMTR-MCNC: 319 MG/DL (ref 70–99)
HAV IGM SER QL: 2.5 MG/DL (ref 1.6–2.6)
HCT VFR BLD AUTO: 21.8 % (ref 35–48)
HGB BLD-MCNC: 7.2 G/DL (ref 12–16)
LYMPHOCYTES NFR BLD: 11 %
LYMPHOCYTES NFR BLD: 2.33 X10(3) UL (ref 1–4)
M PROTEIN MFR SERPL ELPH: 7 G/DL (ref 6.4–8.2)
MCH RBC QN AUTO: 28.7 PG (ref 26–34)
MCHC RBC AUTO-ENTMCNC: 33 G/DL (ref 31–37)
MCV RBC AUTO: 86.9 FL (ref 80–100)
MONOCYTES # BLD: 0.64 X10(3) UL (ref 0.1–1)
MONOCYTES NFR BLD: 3 %
MORPHOLOGY: NORMAL
NEUTROPHILS # BLD AUTO: 17.22 X10 (3) UL (ref 1.5–7.7)
NEUTROPHILS NFR BLD: 81 %
NEUTS BAND NFR BLD: 1 %
NEUTS HYPERSEG # BLD: 17.38 X10(3) UL (ref 1.5–7.7)
OSMOLALITY SERPL CALC.SUM OF ELEC: 330 MOSM/KG (ref 275–295)
PLATELET # BLD AUTO: 387 10(3)UL (ref 150–450)
PLATELET MORPHOLOGY: NORMAL
POTASSIUM SERPL-SCNC: 4.7 MMOL/L (ref 3.5–5.1)
RBC # BLD AUTO: 2.51 X10(6)UL (ref 3.8–5.3)
SODIUM SERPL-SCNC: 139 MMOL/L (ref 136–145)
TOTAL CELLS COUNTED: 100
TSI SER-ACNC: 0.55 MIU/ML (ref 0.36–3.74)
WBC # BLD AUTO: 21.2 X10(3) UL (ref 4–11)

## 2020-02-01 NOTE — PROGRESS NOTES
[facility]    Progress Note    Wilfredo Torres Patient Status:  Inpatient    1935 MRN [id number]   Location [facility] 5SW/SE Attending Elizabeth Whitt MD   Hosp Day # 1 PCP Yvonne Dai MD       Subjective:    Wilfredo Torres 01/31/2020     (L) 01/31/2020    K 5.4 (H) 01/31/2020     01/31/2020    CO2 24.0 01/31/2020     (H) 01/31/2020    CA 10.3 (H) 01/31/2020    ALB 2.7 (L) 10/28/2019    ALKPHO 83 10/28/2019    BILT 0.5 10/28/2019    TP 6.8 10/28/2019    AST Approved by (CST):  Nan Donovan MD on 1/31/2020 at 11:20          Ekg 12-lead    Result Date: 1/31/2020  ECG Report  Interpretation  -------------------------- Sinus Rhythm - occasional ectopic ventricular beat -Right bundle branch block with left axi

## 2020-02-01 NOTE — PLAN OF CARE
A/Ox2. Alarm parameters appropriate for pt: bed and chair alarm. Fall risk precautions appropriate for pt: bed in lowest position, frequent rounding. Frequent turns. Tramadol given for pain. Pt moaning. Blood sugars controlled with novolog.  Attempted to c patient and family perspectives and choices  Outcome: Progressing

## 2020-02-01 NOTE — OCCUPATIONAL THERAPY NOTE
OCCUPATIONAL THERAPY EVALUATION - INPATIENT     Room Number: 560/560-A  Evaluation Date: 2/1/2020  Type of Evaluation: Initial  Presenting Problem: acute renal failure    Physician Order: IP Consult to Occupational Therapy  Reason for Therapy: ADL/IADL Dys patient was ambulatory prior to admission, patient would benefit from a rehab stay, but if patient was primarily non-ambulatory, patient would benefit from [facility] with 24 hour care.       DISCHARGE RECOMMENDATIONS  OT Discharge Recommendations: Home with home h N/A 8/3/2017    Performed by Selma Kay MD at [address] [place] [address]  Type of Home: House  Home Layout: One level  Lives With: Spouse; Family                      Drives: No  Patient Regularly Uses: (Unable to provide history)    Stairs in care of personal grooming such as brushing teeth?: A Lot  -   Eating meals?: A Little    AM-PAC Score:  Score: 13  Approx Degree of Impairment: 63.03%  Standardized Score (AM-PAC Scale): 32.03  CMS Modifier (G-Code): CL    FUNCTIONAL TRANSFER ASSESSMENT  S

## 2020-02-01 NOTE — PROGRESS NOTES
Nyla placed: no output. Attempted to straight cath pt-no output. [address] [facility] [address] on high suction.

## 2020-02-01 NOTE — PHYSICAL THERAPY NOTE
PHYSICAL THERAPY EVALUATION - INPATIENT     Room Number: 560/560-A  Evaluation Date: 2/1/2020  Type of Evaluation: Initial   Physician Order: PT Eval and Treat    Presenting Problem: (Acute Renal failure, CKD, Generalized weakness)  Reason for Therapy:  Radha Hague preparation for discharge. If pt was ambulatory PTA, will recommend SHANIA upon DC. If pt was non ambulatory PTA, recommend HHPT with 24 hour care. Pt is unable to provide any history, no family member pt. Per RN, per her report, pt is bed-bound.  Will contin Carpal tunnel syndrome    • CHF (congestive heart failure) (HCC)    • Congestive heart disease ([facility] 11/6/14   • Ejection fraction < 50%    • Esophageal reflux    • Essential hypertension    • Glaucoma    • Gout    • High blood pressure    • Hyperlipidemia tested      ACTIVITY TOLERANCE  Pulse: 79                      O2 WALK  SPO2 on Room Air at Rest: 100               AM-PAC '6-Clicks' [address]  How much difficulty does the patient currently have. ..  -   Turning over in bed (in

## 2020-02-01 NOTE — DIETARY NOTE
ADULT NUTRITION INITIAL ASSESSMENT    Pt is at moderate nutrition risk. Pt does not meet malnutrition criteria.       NUTRITION DIAGNOSIS/PROBLEM:   1) Increased nutrient needs related to increase demand  wound healing as evidenced by presence of stage II meals and Encouraged increased PO intake  - Medical Food or Oral Nutrition Supplements (ONS) -RD added High Mikel/Protein Supplement Nepro BID. Use/benefits discussed. - Vitamin and mineral supplements:   none  - Nutrition education: not appropriate .  D/w p Calories: 6096-6214 calories/day (25-28 calories per kg Actual body wt (ABW))  Protein: 58 g protein/day (1.2 g protein/kg  Ideal body wt (IBW)) adjust per renal tolerance on long term goals.      MONITOR AND EVALUATE/NUTRITION GOALS:  - Food and Nutrient

## 2020-02-01 NOTE — CONSULTS
[facility]    Report of Consultation    Kirk Curran Patient Status:  Inpatient    1935 MRN [id number]   Location [facility] 5SW/SE Attending Madison Ferguson MD   Hosp Day # 1 PCP Matias Gonzalez MD     Date of Admission:   History  Family History   Problem Relation Age of Onset   • Hypertension Other         per  family hx   • Diabetes Other         per  family hx   • Cancer Other         per  family hx       Social History  Social History    Tobacco Use      Smoking s mg total) by mouth daily. colchicine 0.6 MG Oral Tab, Take 1 tablet (0.6 mg total) by mouth daily. hydrALAzine HCl 50 MG Oral Tab, Take 1 tablet (50 mg total) by mouth every 8 (eight) hours.  (Patient taking differently: Take 100 mg by mouth every 8 (eigh (Last 24 hours) at 2/1/2020 1117  Last data filed at 2/1/2020 0849  Gross per 24 hour   Intake 2013 ml   Output —   Net 2013 ml     Wt Readings from Last 1 Encounters:  01/31/20 : 138 lb 11.2 oz (62.9 kg)      Exam  Gen: No acute distress  Heent: NC AT, mu Hernandez Ambrosio MD on 1/31/2020 at 14:08                    Impression/Receommendations:     1 - ESTHELA  Agree she is volume depleted  Hold diuretics  Continue IVF but decrease rate as she has CHF    2 - CHF  As above    3 - sepsis  W/u ongoing per Dr Corea Rosario    4 - hyp

## 2020-02-02 ENCOUNTER — APPOINTMENT (OUTPATIENT)
Dept: ULTRASOUND IMAGING | Facility: HOSPITAL | Age: 85
DRG: 683 | End: 2020-02-02
Attending: FAMILY MEDICINE
Payer: MEDICARE

## 2020-02-02 LAB
ALBUMIN SERPL-MCNC: 1.7 G/DL (ref 3.4–5)
ANION GAP SERPL CALC-SCNC: 6 MMOL/L (ref 0–18)
BASOPHILS # BLD: 0 X10(3) UL (ref 0–0.2)
BASOPHILS NFR BLD: 0 %
BUN BLD-MCNC: 87 MG/DL (ref 7–18)
BUN/CREAT SERPL: 43.9 (ref 10–20)
CALCIUM BLD-MCNC: 9 MG/DL (ref 8.5–10.1)
CHLORIDE SERPL-SCNC: 111 MMOL/L (ref 98–112)
CO2 SERPL-SCNC: 23 MMOL/L (ref 21–32)
CREAT BLD-MCNC: 1.98 MG/DL (ref 0.55–1.02)
DEPRECATED HBV CORE AB SER IA-ACNC: 2283.9 NG/ML (ref 18–340)
DEPRECATED RDW RBC AUTO: 49.6 FL (ref 35.1–46.3)
EOSINOPHIL # BLD: 0 X10(3) UL (ref 0–0.7)
EOSINOPHIL NFR BLD: 0 %
ERYTHROCYTE [DISTWIDTH] IN BLOOD BY AUTOMATED COUNT: 15.8 % (ref 11–15)
GLUCOSE BLD-MCNC: 270 MG/DL (ref 70–99)
GLUCOSE BLDC GLUCOMTR-MCNC: 276 MG/DL (ref 70–99)
GLUCOSE BLDC GLUCOMTR-MCNC: 295 MG/DL (ref 70–99)
GLUCOSE BLDC GLUCOMTR-MCNC: 305 MG/DL (ref 70–99)
GLUCOSE BLDC GLUCOMTR-MCNC: 334 MG/DL (ref 70–99)
HCT VFR BLD AUTO: 21.1 % (ref 35–48)
HEMOCCULT STL QL: NEGATIVE
HGB BLD-MCNC: 7.1 G/DL (ref 12–16)
IRON SATURATION: 26 % (ref 15–50)
IRON SERPL-MCNC: 40 UG/DL (ref 50–170)
LYMPHOCYTES NFR BLD: 0.98 X10(3) UL (ref 1–4)
LYMPHOCYTES NFR BLD: 5 %
MCH RBC QN AUTO: 29.6 PG (ref 26–34)
MCHC RBC AUTO-ENTMCNC: 33.6 G/DL (ref 31–37)
MCV RBC AUTO: 87.9 FL (ref 80–100)
MONOCYTES # BLD: 0.39 X10(3) UL (ref 0.1–1)
MONOCYTES NFR BLD: 2 %
MORPHOLOGY: NORMAL
NEUTROPHILS # BLD AUTO: 15.16 X10 (3) UL (ref 1.5–7.7)
NEUTROPHILS NFR BLD: 84 %
NEUTS BAND NFR BLD: 9 %
NEUTS HYPERSEG # BLD: 18.14 X10(3) UL (ref 1.5–7.7)
OSMOLALITY SERPL CALC.SUM OF ELEC: 326 MOSM/KG (ref 275–295)
PHOSPHATE SERPL-MCNC: 2.7 MG/DL (ref 2.5–4.9)
PLATELET # BLD AUTO: 385 10(3)UL (ref 150–450)
PLATELET MORPHOLOGY: NORMAL
POTASSIUM SERPL-SCNC: 4 MMOL/L (ref 3.5–5.1)
RBC # BLD AUTO: 2.4 X10(6)UL (ref 3.8–5.3)
SODIUM SERPL-SCNC: 140 MMOL/L (ref 136–145)
TOTAL CELLS COUNTED: 100
TOTAL IRON BINDING CAPACITY: 156 UG/DL (ref 240–450)
TRANSFERRIN SERPL-MCNC: 105 MG/DL (ref 200–360)
WBC # BLD AUTO: 19.5 X10(3) UL (ref 4–11)

## 2020-02-02 RX ORDER — DEXTROSE MONOHYDRATE 25 G/50ML
50 INJECTION, SOLUTION INTRAVENOUS AS NEEDED
Status: DISCONTINUED | OUTPATIENT
Start: 2020-02-02 | End: 2020-02-07

## 2020-02-02 NOTE — PLAN OF CARE
A/Ox2. Alarm parameters appropriate for pt: bed and chair alarm. Fall risk precautions appropriate for pt: bed in lowest position, frequent rounding. Frequent turns. Pt moaning, Tramadol given for pain.   Pt'd dietary intake in diminished: she finished die backgrounds into the planning and delivery of care  - Encourage patient/family to participate in care and decision-making at the level they choose  - Honor patient and family perspectives and choices  Outcome: Progressing

## 2020-02-02 NOTE — PROGRESS NOTES
[facility]    Progress Note    Suellen Arias Patient Status:  Inpatient    1935 MRN [id number]   Location [facility] 5SW/SE Attending Rivera Perez MD   Hosp Day # 2 PCP Katharine Velazquez MD       Subjective:    Suellen Arias (cpt=71250)    Result Date: 1/31/2020  CONCLUSION:  1. No evidence of pneumonia or other acute finding. 2. Hilar prominence seen on the chest radiograph is related to pulmonary artery enlargement and slight patient rotation.  3. A 5 mm right middle lobe pul

## 2020-02-02 NOTE — PROGRESS NOTES
[facility]    Progress Note    Kayce Kramer Patient Status:  Inpatient    1935 MRN [id number]   Location [facility] 5SW/SE Attending Mary Ann Grimes MD   Hosp Day # 2 PCP Dave Greenwood MD       Subjective:    Kayce Kramer 02/02/2020    HCT 21.1 (L) 02/02/2020    .0 02/02/2020    CREATSERUM 1.98 (H) 02/02/2020    BUN 87 (H) 02/02/2020     02/02/2020    K 4.0 02/02/2020     02/02/2020    CO2 23.0 02/02/2020     (H) 02/02/2020    CA 9.0 02/02/2020 Bilateral rotator cuff tears. Dictated by (CST): Namrata Lynn MD on 1/31/2020 at 11:16     Approved by (CST):  Namrata Lynn MD on 1/31/2020 at 11:20          Ekg 12-lead    Result Date: 1/31/2020  ECG Report  Interpretation  -------------------

## 2020-02-03 ENCOUNTER — APPOINTMENT (OUTPATIENT)
Dept: ULTRASOUND IMAGING | Facility: HOSPITAL | Age: 85
DRG: 683 | End: 2020-02-03
Attending: FAMILY MEDICINE
Payer: MEDICARE

## 2020-02-03 LAB
ALBUMIN SERPL-MCNC: 1.8 G/DL (ref 3.4–5)
ANION GAP SERPL CALC-SCNC: 7 MMOL/L (ref 0–18)
BASOPHILS # BLD: 0.22 X10(3) UL (ref 0–0.2)
BASOPHILS NFR BLD: 1 %
BUN BLD-MCNC: 81 MG/DL (ref 7–18)
BUN/CREAT SERPL: 49.4 (ref 10–20)
CALCIUM BLD-MCNC: 9 MG/DL (ref 8.5–10.1)
CHLORIDE SERPL-SCNC: 114 MMOL/L (ref 98–112)
CO2 SERPL-SCNC: 22 MMOL/L (ref 21–32)
CREAT BLD-MCNC: 1.64 MG/DL (ref 0.55–1.02)
CRP SERPL-MCNC: 27.1 MG/DL (ref ?–0.3)
DEPRECATED RDW RBC AUTO: 50.7 FL (ref 35.1–46.3)
EOSINOPHIL # BLD: 0.66 X10(3) UL (ref 0–0.7)
EOSINOPHIL NFR BLD: 3 %
ERYTHROCYTE [DISTWIDTH] IN BLOOD BY AUTOMATED COUNT: 16 % (ref 11–15)
ERYTHROCYTE [SEDIMENTATION RATE] IN BLOOD: >145 MM/HR (ref 0–30)
GLUCOSE BLD-MCNC: 280 MG/DL (ref 70–99)
GLUCOSE BLDC GLUCOMTR-MCNC: 206 MG/DL (ref 70–99)
GLUCOSE BLDC GLUCOMTR-MCNC: 206 MG/DL (ref 70–99)
GLUCOSE BLDC GLUCOMTR-MCNC: 290 MG/DL (ref 70–99)
GLUCOSE BLDC GLUCOMTR-MCNC: 318 MG/DL (ref 70–99)
GLUCOSE BLDC GLUCOMTR-MCNC: 346 MG/DL (ref 70–99)
HCT VFR BLD AUTO: 22.4 % (ref 35–48)
HGB BLD-MCNC: 7.1 G/DL (ref 12–16)
LYMPHOCYTES NFR BLD: 1.32 X10(3) UL (ref 1–4)
LYMPHOCYTES NFR BLD: 6 %
MCH RBC QN AUTO: 28.5 PG (ref 26–34)
MCHC RBC AUTO-ENTMCNC: 31.7 G/DL (ref 31–37)
MCV RBC AUTO: 90 FL (ref 80–100)
METAMYELOCYTES # BLD: 0.88 X10(3) UL
METAMYELOCYTES NFR BLD: 4 %
MONOCYTES # BLD: 0.44 X10(3) UL (ref 0.1–1)
MONOCYTES NFR BLD: 2 %
MYELOCYTES # BLD: 0.44 X10(3) UL
MYELOCYTES NFR BLD: 2 %
NEUTROPHILS # BLD AUTO: 16.84 X10 (3) UL (ref 1.5–7.7)
NEUTROPHILS NFR BLD: 81 %
NEUTS BAND NFR BLD: 1 %
NEUTS HYPERSEG # BLD: 18.04 X10(3) UL (ref 1.5–7.7)
OSMOLALITY SERPL CALC.SUM OF ELEC: 330 MOSM/KG (ref 275–295)
PHOSPHATE SERPL-MCNC: 1.8 MG/DL (ref 2.5–4.9)
PLATELET # BLD AUTO: 417 10(3)UL (ref 150–450)
PLATELET MORPHOLOGY: NORMAL
POTASSIUM SERPL-SCNC: 4.1 MMOL/L (ref 3.5–5.1)
RBC # BLD AUTO: 2.49 X10(6)UL (ref 3.8–5.3)
RHEUMATOID FACT SERPL-ACNC: <10 IU/ML (ref ?–15)
SODIUM SERPL-SCNC: 143 MMOL/L (ref 136–145)
TOTAL CELLS COUNTED: 100
URATE SERPL-MCNC: 6 MG/DL (ref 2.6–6)
WBC # BLD AUTO: 22 X10(3) UL (ref 4–11)

## 2020-02-03 PROCEDURE — 99223 1ST HOSP IP/OBS HIGH 75: CPT | Performed by: INTERNAL MEDICINE

## 2020-02-03 PROCEDURE — 99222 1ST HOSP IP/OBS MODERATE 55: CPT | Performed by: INTERNAL MEDICINE

## 2020-02-03 PROCEDURE — 99222 1ST HOSP IP/OBS MODERATE 55: CPT | Performed by: OTHER

## 2020-02-03 PROCEDURE — 76700 US EXAM ABDOM COMPLETE: CPT | Performed by: FAMILY MEDICINE

## 2020-02-03 RX ORDER — PREDNISONE 10 MG/1
10 TABLET ORAL
Status: DISCONTINUED | OUTPATIENT
Start: 2020-02-03 | End: 2020-02-04

## 2020-02-03 RX ORDER — PREDNISONE 10 MG/1
10 TABLET ORAL
Status: DISCONTINUED | OUTPATIENT
Start: 2020-02-04 | End: 2020-02-03

## 2020-02-03 NOTE — PROGRESS NOTES
[facility]    Progress Note    Wilfredo Torres Patient Status:  Inpatient    1935 MRN [id number]   Location [facility] 5SW/SE Attending Elizabeth Whitt MD   Hosp Day # 3 PCP Yvonne Dai MD       Subjective:    Wilfredo Torres (cpt=76700)    Result Date: 2/3/2020  CONCLUSION:  1.  Gallbladder contains cholelithiasis and sludge. There is otherwise no evidence for acute cholecystitis. No biliary ductal dilatation. 2.  Simple cyst in the upper pole of the right kidney.   Dictated

## 2020-02-03 NOTE — CONSULTS
[facility]    Report of Consultation    Rea Agapito Patient Status:  Inpatient    1935 MRN [id number]   Location [facility] 5SW/SE Attending Jesse Scales MD   Hosp Day # 3 PCP Marta Rachel MD     Date of Admission: years ago. Her smoking use included cigarettes. She has never used smokeless tobacco. She reports that she does not drink alcohol or use drugs.     Allergies:    Meloxicam               ANXIETY  Penicillins             HIVES    Medications:    Current Facil 112/52, pulse 82, temperature 97.8 °F (36.6 °C), temperature source Oral, resp. rate 18, height 5' 1\" (1.549 m), weight 136 lb 12.8 oz (62.1 kg), SpO2 96 %, not currently breastfeeding.     General: Alert, is being fed, screams in pain in between eating  H

## 2020-02-03 NOTE — CONSULTS
Neurology Inpatient Consult Note    Chad Dodd : 1935   Referring Physician: Dr. Nico Ortiz  HPI:     Chad Dodd is a 82year old female who is being seen in neurologic evaluation. Patient being seen in evaluation for diffuse pain.   Pa status: Former Smoker        Types: Cigarettes        Quit date: 1975        Years since quittin.5      Smokeless tobacco: Never Used    Substance and Sexual Activity      Alcohol use: No      Drug use: No    Other Topics      Concerns:        Caf thomas.      ASSESSMENT AND PLAN:   Diffuse pain  Vascular dementia  Patient may have difficulty localizing pain, due to severity of dementia. Lower suspicion for primary neurologic etiology, given distribution of pain symptoms.     –Delirium precautions:  M

## 2020-02-03 NOTE — WOUND PROGRESS NOTE
WOUND CARE NOTE    History:  Past Medical History:   Diagnosis Date   • Anemia    • Arthritis    • Atherosclerosis of coronary artery    • Back pain    • Cardiomyopathy [facility])    • Carpal tunnel syndrome    • [facility]    • John Paul Left buttock cheek ulcer      ASSESSMENT   Hernandez Score:  Hernandez Scale Score: 12    Chart Reviewed: yes    Wound(s):  The pt. is sitting up in bed, no family here at this time.  The pt's breakfast is at the bedside, I offered to help her or feed her, she is

## 2020-02-03 NOTE — DIABETES ED
Patient not appropriate for education at this time per RN. Will continue to monitor and return when education appropriate.

## 2020-02-03 NOTE — PROGRESS NOTES
[facility]    Progress Note    Leon Olmedo Patient Status:  Inpatient    1935 MRN [id number]   Location [facility] 5SW/SE Attending Scooter Morocho MD   Hosp Day # 3 PCP Tre Jackson MD       Subjective:    Leon Olmedo 9.0 9.0 9.0   ALB 1.8* 1.7* 1.8*    140 143   K 4.7 4.0 4.1    111 114*   CO2 22.0 23.0 22.0   ALKPHO 132  --   --    AST 38*  --   --    ALT 21  --   --    BILT 0.4  --   --    TP 7.0  --   --        No results for input(s): DANNA CASTILLO in the la not  recommended for use with pregnant women.      06/10/2014 44 (L) >60 Final     Comment:     Chronic Kidney Disease: GFR <60 ml/min/1.73 m2  Kidney failure: GFR <15 ml/min/1.73 m2  The accuracy of the MDRD equation is not suitable   for acute renal failu - 16.0 g/dL Final     PLT   Date Value Ref Range Status   02/03/2020 417.0 150.0 - 450.0 10(3)uL Final   02/02/2020 385.0 150.0 - 450.0 10(3)uL Final   02/01/2020 387.0 150.0 - 450.0 10(3)uL Final   01/31/2020 433.0 150.0 - 450.0 10(3)uL Final   10/29/2019

## 2020-02-04 ENCOUNTER — APPOINTMENT (OUTPATIENT)
Dept: CV DIAGNOSTICS | Facility: HOSPITAL | Age: 85
DRG: 683 | End: 2020-02-04
Attending: INTERNAL MEDICINE
Payer: MEDICARE

## 2020-02-04 LAB
ALBUMIN SERPL-MCNC: 1.6 G/DL (ref 3.4–5)
ANION GAP SERPL CALC-SCNC: 8 MMOL/L (ref 0–18)
ANTIBODY SCREEN: NEGATIVE
BASOPHILS # BLD: 0 X10(3) UL (ref 0–0.2)
BASOPHILS NFR BLD: 0 %
BUN BLD-MCNC: 57 MG/DL (ref 7–18)
BUN/CREAT SERPL: 44.5 (ref 10–20)
C3 SERPL-MCNC: 155 MG/DL (ref 90–180)
C4 SERPL-MCNC: 17.1 MG/DL (ref 10–40)
CALCIUM BLD-MCNC: 8.6 MG/DL (ref 8.5–10.1)
CHLORIDE SERPL-SCNC: 110 MMOL/L (ref 98–112)
CO2 SERPL-SCNC: 21 MMOL/L (ref 21–32)
CREAT BLD-MCNC: 1.28 MG/DL (ref 0.55–1.02)
DEPRECATED RDW RBC AUTO: 48.9 FL (ref 35.1–46.3)
EOSINOPHIL # BLD: 0.23 X10(3) UL (ref 0–0.7)
EOSINOPHIL NFR BLD: 1 %
ERYTHROCYTE [DISTWIDTH] IN BLOOD BY AUTOMATED COUNT: 15.9 % (ref 11–15)
GLUCOSE BLD-MCNC: 254 MG/DL (ref 70–99)
GLUCOSE BLDC GLUCOMTR-MCNC: 248 MG/DL (ref 70–99)
GLUCOSE BLDC GLUCOMTR-MCNC: 276 MG/DL (ref 70–99)
GLUCOSE BLDC GLUCOMTR-MCNC: 316 MG/DL (ref 70–99)
GLUCOSE BLDC GLUCOMTR-MCNC: 352 MG/DL (ref 70–99)
HBV CORE AB SERPL QL IA: NONREACTIVE
HBV SURFACE AB SER QL: NONREACTIVE
HBV SURFACE AB SERPL IA-ACNC: <3.1 MIU/ML
HBV SURFACE AG SER-ACNC: <0.1 [IU]/L
HBV SURFACE AG SERPL QL IA: NONREACTIVE
HCT VFR BLD AUTO: 20.4 % (ref 35–48)
HCV AB SERPL QL IA: NONREACTIVE
HGB BLD-MCNC: 6.8 G/DL (ref 12–16)
HGB BLD-MCNC: 8.7 G/DL (ref 12–16)
LYMPHOCYTES NFR BLD: 1.39 X10(3) UL (ref 1–4)
LYMPHOCYTES NFR BLD: 6 %
MCH RBC QN AUTO: 29.1 PG (ref 26–34)
MCHC RBC AUTO-ENTMCNC: 33.3 G/DL (ref 31–37)
MCV RBC AUTO: 87.2 FL (ref 80–100)
METAMYELOCYTES # BLD: 0.23 X10(3) UL
METAMYELOCYTES NFR BLD: 1 %
MONOCYTES # BLD: 0.23 X10(3) UL (ref 0.1–1)
MONOCYTES NFR BLD: 1 %
NEUTROPHILS # BLD AUTO: 17.97 X10 (3) UL (ref 1.5–7.7)
NEUTROPHILS NFR BLD: 89 %
NEUTS BAND NFR BLD: 2 %
NEUTS HYPERSEG # BLD: 21.11 X10(3) UL (ref 1.5–7.7)
OSMOLALITY SERPL CALC.SUM OF ELEC: 312 MOSM/KG (ref 275–295)
PHOSPHATE SERPL-MCNC: 1.9 MG/DL (ref 2.5–4.9)
PLATELET # BLD AUTO: 418 10(3)UL (ref 150–450)
PLATELET MORPHOLOGY: NORMAL
POTASSIUM SERPL-SCNC: 4.6 MMOL/L (ref 3.5–5.1)
RBC # BLD AUTO: 2.34 X10(6)UL (ref 3.8–5.3)
RH BLOOD TYPE: POSITIVE
SODIUM SERPL-SCNC: 139 MMOL/L (ref 136–145)
TOTAL CELLS COUNTED: 100
WBC # BLD AUTO: 23.2 X10(3) UL (ref 4–11)

## 2020-02-04 PROCEDURE — 30233N1 TRANSFUSION OF NONAUTOLOGOUS RED BLOOD CELLS INTO PERIPHERAL VEIN, PERCUTANEOUS APPROACH: ICD-10-PCS | Performed by: INTERNAL MEDICINE

## 2020-02-04 PROCEDURE — 99232 SBSQ HOSP IP/OBS MODERATE 35: CPT | Performed by: INTERNAL MEDICINE

## 2020-02-04 PROCEDURE — 99231 SBSQ HOSP IP/OBS SF/LOW 25: CPT | Performed by: OTHER

## 2020-02-04 PROCEDURE — 93306 TTE W/DOPPLER COMPLETE: CPT | Performed by: INTERNAL MEDICINE

## 2020-02-04 RX ORDER — FUROSEMIDE 20 MG/1
20 TABLET ORAL DAILY
Status: DISCONTINUED | OUTPATIENT
Start: 2020-02-04 | End: 2020-02-04

## 2020-02-04 RX ORDER — DIPHENHYDRAMINE HYDROCHLORIDE 50 MG/ML
25 INJECTION INTRAMUSCULAR; INTRAVENOUS ONCE
Status: COMPLETED | OUTPATIENT
Start: 2020-02-04 | End: 2020-02-04

## 2020-02-04 RX ORDER — ACETAMINOPHEN 325 MG/1
650 TABLET ORAL ONCE
Status: COMPLETED | OUTPATIENT
Start: 2020-02-04 | End: 2020-02-04

## 2020-02-04 RX ORDER — SODIUM CHLORIDE 9 MG/ML
INJECTION, SOLUTION INTRAVENOUS ONCE
Status: COMPLETED | OUTPATIENT
Start: 2020-02-04 | End: 2020-02-04

## 2020-02-04 RX ORDER — FUROSEMIDE 20 MG/1
20 TABLET ORAL EVERY OTHER DAY
Status: DISCONTINUED | OUTPATIENT
Start: 2020-02-04 | End: 2020-02-06

## 2020-02-04 NOTE — PROGRESS NOTES
[facility]    Progress Note    Yahir Nails Patient Status:  Inpatient    1935 MRN [id number]   Location [facility] 5SW/SE Attending Bandar Domingo MD   Hosp Day # 4 PCP Norberto Souza MD       Subjective:    Yahir Nails 81* 57*   CREATSERUM 2.27* 1.98* 1.64* 1.28*   GFRAA 23* 27* 33* 45*   GFRNAA 20* 23* 29* 39*   CA 9.0 9.0 9.0 8.6   ALB 1.8* 1.7* 1.8* 1.6*    140 143 139   K 4.7 4.0 4.1 4.6    111 114* 110   CO2 22.0 23.0 22.0 21.0   ALKPHO 132  --   --   -- m2  Kidney failure: GFR <15 ml/min/1.73 m2  The accuracy of the MDRD equation is not suitable  for acute renal failure patients and it is not  recommended for use with pregnant women.      06/10/2014 44 (L) >60 Final     Comment:     Chronic Kidney Disease: (L) 12.0 - 16.0 g/dL Final   02/01/2020 7.2 (L) 12.0 - 16.0 g/dL Final   01/31/2020 9.1 (L) 12.0 - 16.0 g/dL Final   10/29/2019 9.8 (L) 12.0 - 16.0 g/dL Final     PLT   Date Value Ref Range Status   02/04/2020 418.0 150.0 - 450.0 10(3)uL Final   02/03/2020

## 2020-02-04 NOTE — CONSULTS
[facility]    PATIENT'S NAME: Amadeo MCMILLAN   ATTENDING PHYSICIAN: Stevie Bolton MD   CONSULTING PHYSICIAN: Lorena Archibald.  Sharan Martinez MD   PATIENT ACCOUNT#:   [id number]    LOCATION:  [address] [place] [address] #:   [id number]       DATE OF B lungs, stable cardiomegaly. Her last uric acid was October 2019 and was 10.8. She has seen Dr. Cheli Dobson in Endocrinology and is on insulin. She was admitted in April 2008 with acute gout in her knees and ankles, and her left knee was aspirated. poor historian, who is tender to touch all over, and does not want to move. VITAL SIGNS:  Blood pressure 100/45, respirations 16, pulse 82 and regular, temperature 97.8. HEENT:  Her hair is thin. No conjunctival injection. No nasal or oral lesions. her for her diabetes, so can increase her doses of insulin if required on prednisone. Rheumatology will follow along. Thank you for the consult. Dictated By Toro Conway MD  d: 02/03/2020 21:00:50  t: 02/04/2020 05:55:23  Kang Henry [id number]

## 2020-02-04 NOTE — PLAN OF CARE
Problem: Diabetes/Glucose Control  Goal: Glucose maintained within prescribed range  Description  INTERVENTIONS:  - Monitor Blood Glucose as ordered  - Assess for signs and symptoms of hyperglycemia and hypoglycemia  - Administer ordered medications to m and evaluate response  - Consider cultural and social influences on pain and pain management  - Manage/alleviate anxiety  - Utilize distraction and/or relaxation techniques  - Monitor for opioid side effects  - Notify MD/LIP if interventions unsuccessful o coordinating discharge planning if the patient needs post-hospital services based on physician/LIP order or complex needs related to functional status, cognitive ability or social support system  Outcome: Progressing

## 2020-02-04 NOTE — PROGRESS NOTES
Neurology Inpatient Follow-up Note      HPI:     Patient being seen in follow up. Interval notes and workup reviewed. Patient continuing to complain of pain.       Past Medical Hisotory:  Reviewed    Medications:  Reviewed    Allergies:    Meloxicam

## 2020-02-04 NOTE — PLAN OF CARE
Problem: Diabetes/Glucose Control  Goal: Glucose maintained within prescribed range  Description  INTERVENTIONS:  - Monitor Blood Glucose as ordered  - Assess for signs and symptoms of hyperglycemia and hypoglycemia  - Administer ordered medications to m and evaluate response  - Consider cultural and social influences on pain and pain management  - Manage/alleviate anxiety  - Utilize distraction and/or relaxation techniques  - Monitor for opioid side effects  - Notify MD/LIP if interventions unsuccessful o coordinating discharge planning if the patient needs post-hospital services based on physician/LIP order or complex needs related to functional status, cognitive ability or social support system  Outcome: Progressing  Hgb 6.8 today.  Will transfuse 1 u PRBC

## 2020-02-04 NOTE — OCCUPATIONAL THERAPY NOTE
OCCUPATIONAL THERAPY TREATMENT NOTE - INPATIENT        Room Number: 560/560-A           Presenting Problem: acute renal failure    Problem List  Principal Problem:    Acute renal failure superimposed on chronic kidney disease, unspecified CKD stage, unspec PAIN ASSESSMENT  Rating: Unable to rate  Location: Hypersensitive to touch everywhere.   Management Techniques: Repositioning;Relaxation       ACTIVITIES OF DAILY LIVING ASSESSMENT  AM-PAC ‘6-Clicks’ Inpatient Daily Activity Short Form  How muc

## 2020-02-04 NOTE — PROGRESS NOTES
[facility]    Progress Note    Lucy Crews Patient Status:  Inpatient    1935 MRN [id number]   Location [facility] 5SW/SE Attending Marjorie Up MD   Hosp Day # 4 PCP Devon Carrizales MD       Subjective:    Lucy Crews Abdomen Complete (cpt=76700)    Result Date: 2/3/2020  CONCLUSION:  1.  Gallbladder contains cholelithiasis and sludge. There is otherwise no evidence for acute cholecystitis. No biliary ductal dilatation.  2.  Simple cyst in the upper pole of the right k

## 2020-02-05 LAB
ALBUMIN SERPL-MCNC: 1.8 G/DL (ref 3.4–5)
ANION GAP SERPL CALC-SCNC: 6 MMOL/L (ref 0–18)
APTT PPP: 35.5 SECONDS (ref 23.2–35.3)
BASOPHILS # BLD: 0 X10(3) UL (ref 0–0.2)
BASOPHILS NFR BLD: 0 %
BLOOD TYPE BARCODE: 7300
BUN BLD-MCNC: 55 MG/DL (ref 7–18)
BUN/CREAT SERPL: 38.7 (ref 10–20)
CALCIUM BLD-MCNC: 8.9 MG/DL (ref 8.5–10.1)
CHLORIDE SERPL-SCNC: 112 MMOL/L (ref 98–112)
CO2 SERPL-SCNC: 22 MMOL/L (ref 21–32)
CONFIRM APTT STACLOT: POSITIVE
CONFIRM DRVVT: 1.1 S (ref 0–1.1)
CONFIRM DRVVT: 1.4 S (ref 0–1.1)
CREAT BLD-MCNC: 1.42 MG/DL (ref 0.55–1.02)
DEPRECATED RDW RBC AUTO: 47.6 FL (ref 35.1–46.3)
EOSINOPHIL # BLD: 0.24 X10(3) UL (ref 0–0.7)
EOSINOPHIL NFR BLD: 1 %
ERYTHROCYTE [DISTWIDTH] IN BLOOD BY AUTOMATED COUNT: 15.6 % (ref 11–15)
GLUCOSE BLD-MCNC: 261 MG/DL (ref 70–99)
GLUCOSE BLDC GLUCOMTR-MCNC: 183 MG/DL (ref 70–99)
GLUCOSE BLDC GLUCOMTR-MCNC: 271 MG/DL (ref 70–99)
GLUCOSE BLDC GLUCOMTR-MCNC: 277 MG/DL (ref 70–99)
GLUCOSE BLDC GLUCOMTR-MCNC: 296 MG/DL (ref 70–99)
HCT VFR BLD AUTO: 26.8 % (ref 35–48)
HGB BLD-MCNC: 8.9 G/DL (ref 12–16)
LYMPHOCYTES NFR BLD: 0.24 X10(3) UL (ref 1–4)
LYMPHOCYTES NFR BLD: 1 %
MCH RBC QN AUTO: 28.9 PG (ref 26–34)
MCHC RBC AUTO-ENTMCNC: 33.2 G/DL (ref 31–37)
MCV RBC AUTO: 87 FL (ref 80–100)
METAMYELOCYTES # BLD: 0.71 X10(3) UL
METAMYELOCYTES NFR BLD: 3 %
MONOCYTES # BLD: 1.19 X10(3) UL (ref 0.1–1)
MONOCYTES NFR BLD: 5 %
MYELOCYTES # BLD: 1.19 X10(3) UL
MYELOCYTES NFR BLD: 5 %
NEUTROPHILS # BLD AUTO: 17.75 X10 (3) UL (ref 1.5–7.7)
NEUTROPHILS NFR BLD: 85 %
NEUTS HYPERSEG # BLD: 20.23 X10(3) UL (ref 1.5–7.7)
OSMOLALITY SERPL CALC.SUM OF ELEC: 314 MOSM/KG (ref 275–295)
PHOSPHATE SERPL-MCNC: 1.6 MG/DL (ref 2.5–4.9)
PLATELET # BLD AUTO: 445 10(3)UL (ref 150–450)
POTASSIUM SERPL-SCNC: 4.6 MMOL/L (ref 3.5–5.1)
PROTHROMBIN TIME: 15.3 SECONDS (ref 11.8–14.5)
RBC # BLD AUTO: 3.08 X10(6)UL (ref 3.8–5.3)
SCREEN DRVVT: 1.2 S (ref 0–1.1)
SODIUM SERPL-SCNC: 140 MMOL/L (ref 136–145)
TOTAL CELLS COUNTED: 100
WBC # BLD AUTO: 23.8 X10(3) UL (ref 4–11)

## 2020-02-05 PROCEDURE — 99232 SBSQ HOSP IP/OBS MODERATE 35: CPT | Performed by: INTERNAL MEDICINE

## 2020-02-05 NOTE — PROGRESS NOTES
[facility]    Progress Note    Kurt Chew Patient Status:  Inpatient    1935 MRN [id number]   Location [facility] 5SW/SE Attending Vern Fountain MD   Hosp Day # 5 PCP Bg May MD       Subjective:    Kurt Chew 110 112   CO2 22.0 21.0 22.0                    Salas Mcneal MD  2/5/2020

## 2020-02-05 NOTE — PROGRESS NOTES
[facility]    Progress Note    Kurt Chew Patient Status:  Inpatient    1935 MRN [id number]   Location [facility] 5SW/SE Attending Vern Fountain MD   Hosp Day # 4 PCP Bg May MD        Subjective:     Constituti diabetic chronic kidney disease, dementia, history of stroke. Dr. Mindy Escobar is seeing her for her diabetes, so is increasing he dose of insulin as required. She received PRBCs today for severe anemia.      Dr. Les Rao will be covering tomorrow.  Pt discussed w Dr Eduardo who states pt c/o cp, dizziness, sob to her concerning for cardiac vs poss pe (no risks).  She stated echo yesterday looked concerning for wall motion abnl.  Pt had labs drawn yest - she reports nl cbc, cr 1.4.  She recommends cardiac eval, and if neg, would like to r/o for pe.  If eval is neg, she still wants pt admitted to tele bed for further cardiac eval under her name.  Pt signed out to Dr Vargas pending labs, further eval and possible imaging for pe. Labs reviewed and wnl including trop. Pt seen by interventional cards fellow who feels that sx's are likely due to unstable angina. Will contact Dr. Eduardo to discuss admission to cardiac tele. Pt received from  Director at s/o; pt awiating lab results. If trop neg, pt to have w/u for possible pe. Pt comfortable at present, hds. Will continue to monitor. Spoke w/ interventional fellow again- will admit to cardiac tele under Dr. Eduardo. No need for pe w/u due to no suspicion. Will start on gentle hydration.

## 2020-02-05 NOTE — PHYSICAL THERAPY NOTE
Attempted PT treatment, pt declined. Pt declined sitting eob, ex in bed, unable to convince pt to participate. Pt was open to therapy returning tomorrow.

## 2020-02-05 NOTE — PROGRESS NOTES
[facility]    Progress Note    Riley Blackwood Patient Status:  Inpatient    1935 MRN [id number]   Location [facility] 5SW/SE Attending Douglas Flores MD   Hosp Day # 5 PCP Giselle Cherry MD       Subjective:    Riley Blackwood 385.0 417.0 418.0  --        Recent Labs   Lab 02/01/20  0706 02/02/20  0650 02/03/20  0712 02/04/20  0712   * 270* 280* 254*   * 87* 81* 57*   CREATSERUM 2.27* 1.98* 1.64* 1.28*   GFRAA 23* 27* 33* 45*   GFRNAA 20* 23* 29* 39*   CA 9.0 9.0 9 it is not  recommended for use with pregnant women.      09/23/2014 47 (L) >60 Final     Comment:     Chronic Kidney Disease: GFR <60 ml/min/1.73 m2  Kidney failure: GFR <15 ml/min/1.73 m2  The accuracy of the MDRD equation is not suitable  for acute renal HGB   Date Value Ref Range Status   02/04/2020 8.7 (L) 12.0 - 16.0 g/dL Final   02/04/2020 6.8 (LL) 12.0 - 16.0 g/dL Final   02/03/2020 7.1 (L) 12.0 - 16.0 g/dL Final   02/02/2020 7.1 (L) 12.0 - 16.0 g/dL Final   02/01/2020 7.2 (L) 12.0 - 16.0 g/dL Fin

## 2020-02-05 NOTE — PROGRESS NOTES
[facility]    Progress Note    Skye End Patient Status:  Inpatient    1935 MRN [id number]   Location [facility] 5SW/SE Attending Chioma Newton MD   Hosp Day # 5 PCP Eliu Renee MD     Subjective:  Sitting in bed Hyperkalemia    Patient is a 82year old female with Type 2 DM who is admitted with genearlized weakness, ESTHELA and leukocytosis. Endocrinology is consulted for inpatient DM management.   She has now been started on Prednisone 15mg PO daily per rheumatology

## 2020-02-05 NOTE — CM/SW NOTE
02/05/20 1400   CM/SW Screening   Referral Source    Information Source Chart review   Patient's Mental Status Alert;Oriented   Patient's [address]   Number of Levels in Home 2   Patient lives with Spouse; Children   Patient Stat

## 2020-02-05 NOTE — CM/SW NOTE
MD will need to document the following in note, please copy & paste, fill in parentheses:     On (00/00/0000) I had a face to face encounter with Chin Novak for a semi electric  hospital bed medical necessity evaluation.  Patient has a history of CHF whic

## 2020-02-06 LAB
CARDIOLIPIN IGG SERPL-ACNC: 13.5 GPL (ref 0–14.9)
CARDIOLIPIN IGM SERPL-ACNC: 10.4 MPL (ref 0–12.4)
GLUCOSE BLDC GLUCOMTR-MCNC: 135 MG/DL (ref 70–99)
GLUCOSE BLDC GLUCOMTR-MCNC: 152 MG/DL (ref 70–99)
GLUCOSE BLDC GLUCOMTR-MCNC: 180 MG/DL (ref 70–99)
GLUCOSE BLDC GLUCOMTR-MCNC: 214 MG/DL (ref 70–99)
GLUCOSE BLDC GLUCOMTR-MCNC: 73 MG/DL (ref 70–99)
IGA SERPL-MCNC: 497 MG/DL (ref 70–312)
IGM SERPL-MCNC: 127 MG/DL (ref 43–279)
IMMUNOGLOBULIN PNL SER-MCNC: 802 MG/DL (ref 791–1643)
NUCLEAR IGG TITR SER IF: POSITIVE {TITER}
PHOSPHATE SERPL-MCNC: 2.3 MG/DL (ref 2.5–4.9)

## 2020-02-06 PROCEDURE — 99233 SBSQ HOSP IP/OBS HIGH 50: CPT | Performed by: INTERNAL MEDICINE

## 2020-02-06 PROCEDURE — 99222 1ST HOSP IP/OBS MODERATE 55: CPT | Performed by: INTERNAL MEDICINE

## 2020-02-06 PROCEDURE — 99232 SBSQ HOSP IP/OBS MODERATE 35: CPT | Performed by: INTERNAL MEDICINE

## 2020-02-06 RX ORDER — TORSEMIDE 20 MG/1
20 TABLET ORAL DAILY
Status: DISCONTINUED | OUTPATIENT
Start: 2020-02-06 | End: 2020-02-07

## 2020-02-06 NOTE — PROGRESS NOTES
[facility]    Progress Note    Suellen Arias Patient Status:  Inpatient    1935 MRN [id number]   Location [facility] 5SW/SE Attending Rivera Perez MD   Hosp Day # 5 PCP Katharine Velazquez MD        Subjective:    Suellen Arias    3..       Diabetes, chronic obstructive pulmonary disease, coronary artery disease, hypertension, high cholesterol, severe congestive heart failure, diabetic chronic kidney disease, dementia, history of stroke.  Dr. Vicky Sterling is seeing her for her uzair

## 2020-02-06 NOTE — PROGRESS NOTES
[facility]    Progress Note    Mckenna Cuevas Patient Status:  Inpatient    1935 MRN [id number]   Location [facility] 5SW/SE Attending Soniya Putnam MD   Hosp Day # 6 PCP Reyna Bhardwaj MD       Subjective:    Mckenna Cuevas RBC 2.49* 2.34*  --  3.08*   HGB 7.1* 6.8* 8.7* 8.9*   HCT 22.4* 20.4*  --  26.8*   MCV 90.0 87.2  --  87.0   MCH 28.5 29.1  --  28.9   MCHC 31.7 33.3  --  33.2   RDW 16.0* 15.9*  --  15.6*   NEPRELIM 16.84* 17.97*  --  17.75*   WBC 22.0* 23.2*  --  23.8 m2  Kidney failure: GFR <15 ml/min/1.73 m2  The accuracy of the MDRD equation is not suitable  for acute renal failure patients and it is not  recommended for use with pregnant women.      10/07/2014 46 (L) >60 Final     Comment:     Chronic Kidney Disease: (L) >=60 Final   02/01/2020 20 (L) >=60 Final   01/31/2020 15 (L) >=60 Final     WBC   Date Value Ref Range Status   02/05/2020 23.8 (H) 4.0 - 11.0 x10(3) uL Final   02/04/2020 23.2 (H) 4.0 - 11.0 x10(3) uL Final   02/03/2020 22.0 (H) 4.0 - 11.0 x10(3) uL

## 2020-02-06 NOTE — PROGRESS NOTES
[facility]    Progress Note    Pippa Ospina Patient Status:  Inpatient    1935 MRN [id number]   Location [facility] 5SW/SE Attending Jannette Reynaga MD   Hosp Day # 6 PCP Nolan Echavarria MD     Subjective:  Sleeping this AM Hyperkalemia    Patient is a 82year old female with Type 2 DM who is admitted with genearlized weakness, ESTHELA and leukocytosis. Endocrinology is consulted for inpatient DM management.   She has now been started on Prednisone 15mg PO daily per rheumatology

## 2020-02-06 NOTE — DIETARY NOTE
ADULT NUTRITION REASSESSMENT     Pt is at moderate nutrition risk. Pt does not meet malnutrition criteria.       NUTRITION DIAGNOSIS/PROBLEM:   1) Increased nutrient needs related to increase demand  wound healing as evidenced by presence of stage II press visit, RD assisted pt at lunch meal and ate 50% of chicken salad sandwich. Pt likes to drink Soda. Dislikes Nepro supplements. Pt will need assist with meals to help increase po.  Pt has been seen by DM Clinician, Insulin regimen managed by Endocrinologist. Poor  Intake: 0-50%, majority of intake <30%.    Intake Meeting Needs: No, but supplements to maximize   Food Allergies: No   Cultural/Ethnic/Rastafarian Preferences: None    MEDICATIONS : reviewed :  corrective insulin, levemir, Prednisone, Received Davion Robert

## 2020-02-06 NOTE — CONSULTS
Cardiology (consult dictated)    Assessment:  1. Asked to see the patient for severe pulmonary hypertension, cardiomyopathy, and mitral regurgitation.     2.  Mention is made of a possible vegetation on the aortic valve, but my review does not support this

## 2020-02-06 NOTE — HOME CARE LIAISON
Received referral from [facility]. Met with patient at the bedside. Patient is agreeable to [facility], pending orders. Residential brochure provided with contact information. All questions addressed and answered.      Patient will need a

## 2020-02-06 NOTE — CONSULTS
[facility]    PATIENT'S NAME: Jose Eduardo Gilman HIPOLITO   ATTENDING PHYSICIAN: Stevie Avila MD   CONSULTING PHYSICIAN: Lashonda Escamilla.  Pallavi Plunkett MD   PATIENT ACCOUNT#:   [id number]    LOCATION:  [address] [place] [address] #:   [id number]       DATE OF BIRTH: Supple with normal jugular venous pressure. Carotids are brisk without bruits. LUNGS:  Clear bilaterally. HEART:  S1 and S2 are normal.  There is a grade 2/6 holosystolic murmur. No S3, rub, or click. ABDOMEN:  Benign. EXTREMITIES:  Warm and dry.   No

## 2020-02-06 NOTE — DIABETES ED
[facility] HOSP - [facility]    Diabetes Education  Note    Emmanuel King Patient Status:  Inpatient   1935 MRN [id number]  Location [facility] 5SW/SE Attending Abdias Conrad MD  Hosp Day # 5 PCP Derek Dietrich MD    Met with pt, daughter Vi nursing supervision if they are available at mealtimes.    · Home health for monitoring/reinforcement of proper medication management     Labs:    HgbA1C (%)   Date Value   01/31/2020 6.4 (H)           Curt Dudley RN, CDE, BC-ADM  Inpatient Diabetes E

## 2020-02-07 ENCOUNTER — TELEPHONE (OUTPATIENT)
Dept: CARDIOLOGY | Age: 85
End: 2020-02-07

## 2020-02-07 VITALS
HEART RATE: 79 BPM | HEIGHT: 61 IN | WEIGHT: 137.81 LBS | TEMPERATURE: 98 F | DIASTOLIC BLOOD PRESSURE: 46 MMHG | OXYGEN SATURATION: 100 % | RESPIRATION RATE: 18 BRPM | SYSTOLIC BLOOD PRESSURE: 98 MMHG | BODY MASS INDEX: 26.02 KG/M2

## 2020-02-07 LAB
ANA NUCLEOLAR TITR SER IF: 80 {TITER}
BETA 2 GLYCOPROTEIN 1 AB, IGG: <3.7 U/ML (ref ?–15)
BETA 2 GLYCOPROTEIN 1 AB, IGM: <3.7 U/ML (ref ?–15)
GLUCOSE BLDC GLUCOMTR-MCNC: 156 MG/DL (ref 70–99)
GLUCOSE BLDC GLUCOMTR-MCNC: 238 MG/DL (ref 70–99)
GLUCOSE BLDC GLUCOMTR-MCNC: 261 MG/DL (ref 70–99)

## 2020-02-07 PROCEDURE — 99223 1ST HOSP IP/OBS HIGH 75: CPT | Performed by: INTERNAL MEDICINE

## 2020-02-07 PROCEDURE — 99232 SBSQ HOSP IP/OBS MODERATE 35: CPT | Performed by: INTERNAL MEDICINE

## 2020-02-07 RX ORDER — PREDNISONE 1 MG/1
10 TABLET ORAL
Qty: 12 TABLET | Refills: 0 | Status: ON HOLD | OUTPATIENT
Start: 2020-02-08 | End: 2020-04-13

## 2020-02-07 RX ORDER — TORSEMIDE 20 MG/1
40 TABLET ORAL 2 TIMES DAILY
Qty: 120 TABLET | Refills: 1 | Status: ON HOLD | OUTPATIENT
Start: 2020-02-07 | End: 2020-04-13

## 2020-02-07 NOTE — CM/SW NOTE
TAMRA informed Gisselle Garcia at [facility] of pt d/c today.     Order for hospital signed, still need:      MD will need to document the following in note, please copy & paste, fill in parentheses:     On (00/00/0000) I had a face to face encounter with (First, Last Name)

## 2020-02-07 NOTE — PROGRESS NOTES
[facility]    Progress Note    Adilene Cerda Patient Status:  Inpatient    1935 MRN [id number]   Location [facility] 5SW/SE Attending Sohail Hernandez MD   Hosp Day # 7 PCP James Perry MD       Subjective:    Adilene Cerda is required to permit safe transfer from the hospital to a chair, wheelchair, or for stand and pivot transfer. Patient requires frequent changes in body position and/or has an immediate need for change in body postion.  Patient is bed bound and is not able Final   02/01/2020 2.27 (H) 0.55 - 1.02 mg/dL Final   01/31/2020 2.86 (H) 0.55 - 1.02 mg/dL Final     GFR/   Date Value Ref Range Status   12/17/2014 22 (L) >60 Final     Comment:     Chronic Kidney Disease: GFR <60 ml/min/1.73 m2  Kidney f 33 (L) >=60 Final   02/02/2020 27 (L) >=60 Final   02/01/2020 23 (L) >=60 Final   01/31/2020 17 (L) >=60 Final     GFR/NON-   Date Value Ref Range Status   12/17/2014 18 (L) >60 Final   12/09/2014 20 (L) >60 Final   10/07/2014 38 (L) >60 Fi Both Eyes TID   • carvedilol  25 mg Oral BID with meals   • colchicine  0.6 mg Oral Daily   • Donepezil HCl  5 mg Oral Nightly   • Dorzolamide HCl-Timolol Mal  1 drop Both Eyes BID   • hydrALAzine HCl  100 mg Oral Q8H [address]   • latanoprost  1 drop Both Eyes

## 2020-02-07 NOTE — PROGRESS NOTES
[facility]    Progress Note    Emmanuel King Patient Status:  Inpatient    1935 MRN [id number]   Location [facility] 5SW/SE Attending Abdias Conrad MD   Hosp Day # 6 PCP Derek Dietrich MD        Subjective:    Emmanuel King order antibodies depending on how high the titer is     3.  Severe Pulmonary HTN  - cardiology following  - may consider R heart cath     4.       Diabetes, chronic obstructive pulmonary disease, coronary artery disease, hypertension, high cholesterol, bibiana

## 2020-02-07 NOTE — CONSULTS
[facility] CONSULT      Patient: Lianna Mercado Date: 2020     : 1935 Attending: Michoacano Browne MD   82year old female Requested by: Dr. Bishop Singh     A/P:  AMS/Confusion with history of dementia  Acute on chronic HFrEF  Sever edema.      Past Medical History:   Diagnosis Date   • Anemia    • Arthritis    • Atherosclerosis of coronary artery    • Back pain    • Cardiomyopathy [facility])    • Carpal tunnel syndrome    • CHF (congestive heart failure) ([facility])    • Congestive heart disease or organization: Not on file        Attends meetings of clubs or organizations: Not on file        Relationship status: Not on file      Intimate partner violence:        Fear of current or ex partner: Not on file        Emotionally abused: Not on file Continuous Infusions:       Vital Last Value 24 Hour Range   Temperature 97.8 °F (36.6 °C) Temp  Min: 97.3 °F (36.3 °C)  Max: 98.5 °F (36.9 °C)   Pulse 79 Pulse  Min: 76  Max: 82   Respiratory 18 Resp  Min: 18  Max: 23   Blood Pressure 98/46 BP  Min: left ventricular diastolic compliance     and/or increased left atrial pressure. 2. Ventricular septum: The contour showed diastolic flattening and     systolic flattening. These changes are consistent with RV volume     and pressure overload.   3. Aortic

## 2020-02-07 NOTE — PROGRESS NOTES
[facility]    Progress Note    Leroy Ramos Patient Status:  Inpatient    1935 MRN [id number]   Location [facility] 5SW/SE Attending Major Pickard MD   Hosp Day # 7 PCP Denis Sauceda MD     Subjective:  Sleeping this AM Hyperkalemia    Patient is a 82year old female with Type 2 DM who is admitted with genearlized weakness, ESTHELA and leukocytosis. Endocrinology is consulted for inpatient DM management.   She has now been started on Prednisone 15mg PO daily per rheumatology

## 2020-02-07 NOTE — PROGRESS NOTES
Left a message for Dr. Hayden Gibson regarding note for Hospital left this RN extension as well as Skylar BARBOZA extension

## 2020-02-07 NOTE — CM/SW NOTE
02/07/20 1500   Discharge disposition   Expected discharge disposition Home or Self   Name of Facillity/Home Care/Hospice Residential   Discharge transportation [facility]  (Will call per Nader Reyna.)   TAMRA DUEÑAS for pt's dtr to inform her of pt d/c.

## 2020-02-08 NOTE — DISCHARGE PLANNING
Al discharge information was reviewed with the patient and family. They verbalized understanding. All questions were answered and follow up appointments discussed.  Prescriptions given to family and they are aware of where to  all electronic prescrip

## 2020-02-10 LAB
MYELOPEROX ANTIBODIES, IGG: 1 AU/ML
SERINE PROTEASE3, IGG: 3 AU/ML

## 2020-02-11 LAB
ALBUMIN SERPL ELPH-MCNC: 2.13 G/DL (ref 3.75–5.21)
ALBUMIN/GLOB SERPL: 0.55 {RATIO} (ref 1–2)
ALPHA1 GLOB SERPL ELPH-MCNC: 0.67 G/DL (ref 0.19–0.46)
ALPHA2 GLOB SERPL ELPH-MCNC: 1.32 G/DL (ref 0.48–1.05)
B-GLOBULIN SERPL ELPH-MCNC: 1.24 G/DL (ref 0.68–1.23)
DSDNA AB TITR SER: <10 {TITER}
GAMMA GLOB SERPL ELPH-MCNC: 0.65 G/DL (ref 0.62–1.7)
M PROTEIN MFR SERPL ELPH: 6 G/DL (ref 6.4–8.2)
M-SPIKE 1: 0.11 G/DL (ref ?–0)
M-SPIKE 2: 0.1 G/DL (ref ?–0)

## 2020-03-09 NOTE — DISCHARGE SUMMARY
[facility]    PATIENT'S NAME: Shaanshelly MCMILLAN   ATTENDING PHYSICIAN: Stevie Haro MD   PATIENT ACCOUNT#:   [id number]    LOCATION:  [address] [place] [address] #:   [id number]       YOB: 1935  ADMISSION DATE:       01/31/20 renal function, oral agents become a concern. The patient was seen by Dr. Alanna Garcia for her diffuse hyperesthesia with pain everywhere.   The patient was seen by Dr. Ruth Edwards because of elevated white count, anemia, and elevation of sedimentation rate

## 2020-04-10 ENCOUNTER — APPOINTMENT (OUTPATIENT)
Dept: GENERAL RADIOLOGY | Facility: HOSPITAL | Age: 85
DRG: 872 | End: 2020-04-10
Attending: EMERGENCY MEDICINE
Payer: MEDICARE

## 2020-04-10 ENCOUNTER — HOSPITAL ENCOUNTER (INPATIENT)
Facility: HOSPITAL | Age: 85
LOS: 6 days | Discharge: HOME | DRG: 872 | End: 2020-04-16
Attending: EMERGENCY MEDICINE | Admitting: HOSPITALIST
Payer: MEDICARE

## 2020-04-10 DIAGNOSIS — R65.10 SIRS (SYSTEMIC INFLAMMATORY RESPONSE SYNDROME) (HCC): ICD-10-CM

## 2020-04-10 DIAGNOSIS — B99.9 INFECTION: ICD-10-CM

## 2020-04-10 DIAGNOSIS — R50.9 FEBRILE ILLNESS: Primary | ICD-10-CM

## 2020-04-10 PROCEDURE — 99223 1ST HOSP IP/OBS HIGH 75: CPT | Performed by: HOSPITALIST

## 2020-04-10 PROCEDURE — 71045 X-RAY EXAM CHEST 1 VIEW: CPT | Performed by: EMERGENCY MEDICINE

## 2020-04-10 PROCEDURE — G2012 BRIEF CHECK IN BY MD/QHP: HCPCS | Performed by: INTERNAL MEDICINE

## 2020-04-10 RX ORDER — METOPROLOL TARTRATE 5 MG/5ML
5 INJECTION INTRAVENOUS ONCE
Status: COMPLETED | OUTPATIENT
Start: 2020-04-10 | End: 2020-04-10

## 2020-04-10 RX ORDER — SODIUM CHLORIDE 0.9 % (FLUSH) 0.9 %
3 SYRINGE (ML) INJECTION AS NEEDED
Status: DISCONTINUED | OUTPATIENT
Start: 2020-04-10 | End: 2020-04-16

## 2020-04-10 RX ORDER — HEPARIN SODIUM 5000 [USP'U]/ML
5000 INJECTION, SOLUTION INTRAVENOUS; SUBCUTANEOUS EVERY 12 HOURS SCHEDULED
Status: DISCONTINUED | OUTPATIENT
Start: 2020-04-10 | End: 2020-04-16

## 2020-04-10 RX ORDER — ONDANSETRON 2 MG/ML
4 INJECTION INTRAMUSCULAR; INTRAVENOUS EVERY 6 HOURS PRN
Status: DISCONTINUED | OUTPATIENT
Start: 2020-04-10 | End: 2020-04-16

## 2020-04-10 RX ORDER — KETOROLAC TROMETHAMINE 15 MG/ML
15 INJECTION, SOLUTION INTRAMUSCULAR; INTRAVENOUS ONCE
Status: COMPLETED | OUTPATIENT
Start: 2020-04-10 | End: 2020-04-10

## 2020-04-10 RX ORDER — DEXTROSE MONOHYDRATE 25 G/50ML
50 INJECTION, SOLUTION INTRAVENOUS
Status: DISCONTINUED | OUTPATIENT
Start: 2020-04-10 | End: 2020-04-16

## 2020-04-10 RX ORDER — ONDANSETRON 2 MG/ML
4 INJECTION INTRAMUSCULAR; INTRAVENOUS EVERY 4 HOURS PRN
Status: DISCONTINUED | OUTPATIENT
Start: 2020-04-10 | End: 2020-04-16

## 2020-04-10 RX ORDER — ACETAMINOPHEN 325 MG/1
650 TABLET ORAL EVERY 6 HOURS PRN
Status: DISCONTINUED | OUTPATIENT
Start: 2020-04-10 | End: 2020-04-16

## 2020-04-10 RX ORDER — KETOROLAC TROMETHAMINE 15 MG/ML
INJECTION, SOLUTION INTRAMUSCULAR; INTRAVENOUS
Status: COMPLETED
Start: 2020-04-10 | End: 2020-04-10

## 2020-04-10 RX ORDER — ACETAMINOPHEN 500 MG
1000 TABLET ORAL ONCE
Status: DISCONTINUED | OUTPATIENT
Start: 2020-04-10 | End: 2020-04-10

## 2020-04-10 RX ORDER — ACETAMINOPHEN 325 MG/1
650 TABLET ORAL ONCE
Status: COMPLETED | OUTPATIENT
Start: 2020-04-10 | End: 2020-04-10

## 2020-04-10 NOTE — ED INITIAL ASSESSMENT (HPI)
Kathlen Mortimer is here via EMS for eval of fever and tongue swelling. Received benadryl by family at home.

## 2020-04-10 NOTE — ED NOTES
Tylenol was not given as patient was drooling the small sip of water given to her prior to giving the tylenol.

## 2020-04-11 ENCOUNTER — APPOINTMENT (OUTPATIENT)
Dept: CT IMAGING | Facility: HOSPITAL | Age: 85
DRG: 872 | End: 2020-04-11
Attending: INTERNAL MEDICINE
Payer: MEDICARE

## 2020-04-11 PROCEDURE — 99232 SBSQ HOSP IP/OBS MODERATE 35: CPT | Performed by: INTERNAL MEDICINE

## 2020-04-11 PROCEDURE — 99233 SBSQ HOSP IP/OBS HIGH 50: CPT | Performed by: HOSPITALIST

## 2020-04-11 PROCEDURE — 70486 CT MAXILLOFACIAL W/O DYE: CPT | Performed by: INTERNAL MEDICINE

## 2020-04-11 RX ORDER — TRAMADOL HYDROCHLORIDE 50 MG/1
50 TABLET ORAL EVERY 12 HOURS PRN
Status: DISCONTINUED | OUTPATIENT
Start: 2020-04-11 | End: 2020-04-16

## 2020-04-11 RX ORDER — METOPROLOL TARTRATE 5 MG/5ML
2.5 INJECTION INTRAVENOUS EVERY 6 HOURS
Status: COMPLETED | OUTPATIENT
Start: 2020-04-11 | End: 2020-04-11

## 2020-04-11 RX ORDER — ALBUTEROL SULFATE 90 UG/1
2 AEROSOL, METERED RESPIRATORY (INHALATION) EVERY 4 HOURS PRN
Status: DISCONTINUED | OUTPATIENT
Start: 2020-04-11 | End: 2020-04-16

## 2020-04-11 RX ORDER — CARVEDILOL 3.12 MG/1
3.12 TABLET ORAL 2 TIMES DAILY WITH MEALS
Status: DISCONTINUED | OUTPATIENT
Start: 2020-04-11 | End: 2020-04-11

## 2020-04-11 RX ORDER — CEFAZOLIN SODIUM/WATER 2 G/20 ML
2 SYRINGE (ML) INTRAVENOUS EVERY 12 HOURS
Status: DISCONTINUED | OUTPATIENT
Start: 2020-04-11 | End: 2020-04-16

## 2020-04-11 RX ORDER — HYDRALAZINE HYDROCHLORIDE 25 MG/1
25 TABLET, FILM COATED ORAL EVERY 8 HOURS SCHEDULED
Status: DISCONTINUED | OUTPATIENT
Start: 2020-04-11 | End: 2020-04-16

## 2020-04-11 RX ORDER — COLCHICINE 0.6 MG/1
0.6 TABLET ORAL DAILY
Status: DISCONTINUED | OUTPATIENT
Start: 2020-04-11 | End: 2020-04-16

## 2020-04-11 RX ORDER — DONEPEZIL HYDROCHLORIDE 5 MG/1
5 TABLET, FILM COATED ORAL NIGHTLY
Status: DISCONTINUED | OUTPATIENT
Start: 2020-04-11 | End: 2020-04-16

## 2020-04-11 RX ORDER — ATORVASTATIN CALCIUM 20 MG/1
20 TABLET, FILM COATED ORAL NIGHTLY
Status: DISCONTINUED | OUTPATIENT
Start: 2020-04-11 | End: 2020-04-16

## 2020-04-11 RX ORDER — CARVEDILOL 12.5 MG/1
12.5 TABLET ORAL 2 TIMES DAILY WITH MEALS
Status: DISCONTINUED | OUTPATIENT
Start: 2020-04-11 | End: 2020-04-16

## 2020-04-11 RX ORDER — METOPROLOL TARTRATE 5 MG/5ML
2.5 INJECTION INTRAVENOUS EVERY 6 HOURS
Status: DISCONTINUED | OUTPATIENT
Start: 2020-04-11 | End: 2020-04-11

## 2020-04-11 RX ORDER — SODIUM CHLORIDE 9 MG/ML
INJECTION, SOLUTION INTRAVENOUS CONTINUOUS
Status: ACTIVE | OUTPATIENT
Start: 2020-04-11 | End: 2020-04-12

## 2020-04-11 RX ORDER — ALLOPURINOL 100 MG/1
100 TABLET ORAL DAILY
Status: DISCONTINUED | OUTPATIENT
Start: 2020-04-11 | End: 2020-04-16

## 2020-04-11 NOTE — PROGRESS NOTES
[facility] Pharmacy Note:  Renal Adjustment for cefazolin (ANCEF)    Sapphire Gonzalez is a 82year old female who has been prescribed cefazolin (ANCEF) 2 g every 8 hrs. CrCl is estimated creatinine clearance is 27 mL/min (A) (based on SCr of 1.21 mg/dL (H)).  so th

## 2020-04-11 NOTE — PROGRESS NOTES
[facility]    Progress Note    Halie Soto Patient Status:  Inpatient    1935 MRN [id number]   Location [facility] 4W/SW/SE Attending Titi Blackwood MD   Hosp Day # 1 PCP Nguyễn Dillon MD         Assessment and Plan: Meds:    • allopurinol  100 mg Oral Daily   • colchicine  0.6 mg Oral Daily   • Donepezil HCl  5 mg Oral Nightly   • atorvastatin  20 mg Oral Nightly   • hydrALAzine HCl  25 mg Oral Q8H [facility]   • metoprolol Tartrate  2.5 mg Intravenous Q6H   • carvedilol  12

## 2020-04-11 NOTE — PLAN OF CARE
Pt came in tonight from home via EMS to ED for febrile illness and SIRS with facial/tongue swelling, pt is slow to respond or sometimes not at all, appears drowsy/fatigued, afebrile throughout night.  Pt has not been up to walk but reports walking with walk range  Description  INTERVENTIONS:  - Monitor Blood Glucose as ordered  - Assess for signs and symptoms of hyperglycemia and hypoglycemia  - Administer ordered medications to maintain glucose within target range  - Assess barriers to adequate nutritional i injury  Description  INTERVENTIONS:  - Assess pt frequently for physical needs  - Identify cognitive and physical deficits and behaviors that affect risk of falls.   - [place] fall precautions as indicated by assessment.  - Educate pt/family on patient sa (call light)  - Provide an  as needed  - Communicate barriers and strategies to overcome with those who interact with patient  Outcome: Progressing

## 2020-04-11 NOTE — H&P
[facility]    PATIENT'S NAME: Radha Sachin   ATTENDING PHYSICIAN: Edison Luke MD   PATIENT ACCOUNT#:   [id number]    LOCATION:  [address] [place] [address] RECORD #:   [id number]       YOB: 1935  ADMISSION DATE:       04/10/202 she felt weak today. She said she does have chronic sporadic cough. No other family members with symptoms. Other 12-point review of systems unobtainable from the patient. PHYSICAL EXAMINATION:    GENERAL:  Alert and oriented.   Able to answer ques

## 2020-04-11 NOTE — CONSULTS
[facility] ID CONSULT NOTE    Skye End Patient Status:  Inpatient    1935 MRN [id number]   Location [facility] 4W/SW/SE Attending Yonis Allen MD   Hosp Day # 1 PCP Eliu Renee MD       Reason for Consu has never used smokeless tobacco. She reports that she does not drink alcohol or use drugs.     Allergies:    Meloxicam               ANXIETY  Penicillins             HIVES    Medications:    Current Facility-Administered Medications:   •  Albuterol Sulfate performed.     Laboratory Data:  Recent Labs   Lab 04/11/20  0500   RBC 3.52*   HGB 10.0*   HCT 32.8*   MCV 93.2   MCH 28.4   MCHC 30.5*   RDW 15.8*   NEPRELIM 10.56*   WBC 13.7*   .0     Recent Labs   Lab 04/10/20  1935 04/11/20  0500   * [phone]  4/11/2020

## 2020-04-11 NOTE — SLP NOTE
ADULT SWALLOWING EVALUATION    ASSESSMENT    ASSESSMENT/OVERALL IMPRESSION:      PT IS R/O COVID 19. PPE REQUIRED. THIS SLP WORE GOWN, GLOVES, GOGGLES, AND /N95 RESPIRATOR MASK. HANDS SANITIZED/WASHED UPON ENTRANCE/EXIT.       This BSE was ordered to r/o as and probable pharyngeal dysfunction. Per [facility] - CARLITOS Scale, Pt's swallow function is Level 4 of 7. Collaborated with RN regarding Pt's swallowing plan of care. RN to obtain diet orders. BSE results/recommendations discussed with Pt; fair understanding noted.  Dayna NPO    Imaging Results:     CXR 4/10/20:  CONCLUSION:   1. Cardiomegaly. Tortuous atherosclerotic aorta   2. Pulmonary arterial hypertension contributing to hilar prominence, unchanged. 3. Prominent perihilar bibasilar markings appear chronic.   No acute Progress   Goal #4 The patient will tolerate trial upgrade of GROUND consistency and THIN liquids without overt signs or symptoms of aspiration with 100 % accuracy over 2 session(s).     In Progress   FOLLOW UP  Treatment Plan/Recommendations: Dysphagia the

## 2020-04-11 NOTE — PLAN OF CARE
Attempted to contact patient's daughter Cyrus Pina to reconcile med list. Patient unable to provide information. Left voicemail to call back.

## 2020-04-11 NOTE — ED PROVIDER NOTES
Patient Seen in: [facility] AND [facility] Emergency Department    History   Patient presents with:  Fever      HPI    Patient presents to the ED complaining of fever and facial swelling. Swelling started last night. Fever today.   Patient otherwise denies symp daily., Disp: 90 tablet, Rfl: 1, 1/30/2020 at Unknown time  colchicine 0.6 MG Oral Tab, Take 1 tablet (0.6 mg total) by mouth daily. , Disp: 30 tablet, Rfl: 1, 1/30/2020 at Unknown time  hydrALAzine HCl 50 MG Oral Tab, Take 1 tablet (50 mg total) by mouth e Disp: 1 each, Rfl: 11, 10/27/2019 at Unknown time         Family History   Problem Relation Age of Onset   • Hypertension Other         per ng family hx   • Diabetes Other         per ng family hx   • Cancer Other         per ng family hx       Smoking Sta Mouth/Throat: Oropharynx is clear and moist.   No appreciable swelling of the face tongue or pharynx, normal voice   Eyes: Conjunctivae are normal. Right eye exhibits no discharge. Left eye exhibits no discharge. Neck: No tracheal deviation present. RBC 3.71 (*)     HGB 10.6 (*)     HCT 33.3 (*)     RDW-SD 54.9 (*)     RDW 17.2 (*)     Neutrophil Absolute Prelim 15.25 (*)     Neutrophil Absolute 15.25 (*)     Monocyte Absolute 1.06 (*)     All other components within normal limits   RAPID SARS-COV- 7:24 PM     Finalized by (CST): Tabatha Breaux MD on 4/10/2020 at 7:27 PM          ED Medications Administered:   Medications   Normal Saline Flush 0.9 % injection 3 mL (has no administration in time range)   Heparin Sodium (Porcine) 5000 UNIT/ML inje pertinent discharge summaries, testing, and procedures and reviewed those reports. Complicating Factors: The patient already has does not have any pertinent problems on file. to contribute to the complexity of this ED evaluation.     ED Course: She prese

## 2020-04-11 NOTE — ED NOTES
ASSUMED CARE TO THIS PT, RECEIVED REPORT FROM MICHAEL MANRIQUE RN  PERR REPORT GIVEN PT CAME IN WITH COMPLAINTS FOR EVALUATION OF FEVER , TONGUE AND FACE SWELLING , HAD BENADRYL AT HOME. PT IS A/O X 3, BREATHING IS NON LABORED.   PT APPEARS WEAK, PT ON CARDIAC MON

## 2020-04-11 NOTE — CONSULTS
[facility]     [facility] Cardiology Consult Note    Martha Quintero Patient Status:  Emergency    1935 MRN [id number]   Location [address] Attending Mathew Mobley MD   Hosp Day # 0 PCP Maricruz Martinez MD disease    • Renal disorder     CKD stage 3   • Stroke [facility])    • Thyroid disease    • Type II or unspecified type diabetes mellitus without mention of complication, not stated as uncontrolled      Past Surgical History:   Procedure Laterality Date   • LUM with meals.  ), Disp: 60 tablet, Rfl: 0  spironolactone 25 MG Oral Tab, Take 25 mg by mouth daily. , Disp: , Rfl:   simvastatin 40 MG Oral Tab, Take 40 mg by mouth nightly., Disp: , Rfl:   glipiZIDE 10 MG Oral Tab, Take 10 mg by mouth 2 (two) times daily be 2. Pulmonary arterial hypertension contributing to hilar prominence, unchanged. 3. Prominent perihilar bibasilar markings appear chronic.   No acute pulmonary consolidation    EKG: sinus tachycardia, RBBB, LAFB, old inferior infarct, poor R wave progress resulting in  severe pulmonary hypertension, severe tricuspid regurgitation and  right ventricle dysfunction. Compared to the previous study these  findings represent indicate worsening.       Assessment and Plan:  81 y/o F with pmh HFrEF (LVEF 25%), CAD s/

## 2020-04-11 NOTE — PROGRESS NOTES
[facility]    Progress Note    Kayce Kramer Patient Status:  Inpatient    1935 MRN [id number]   Location [facility] 4W/SW/SE Attending Lily Guevara MD   Hosp Day # 1 PCP Dave Greenwood MD       Subjective:    Luis Dunaway Subcutaneous 2 times per day   • Insulin Aspart Pen  1-7 Units Subcutaneous TID CC       Current PRN Inpatient Meds:      Albuterol Sulfate HFA, traMADol HCl, sodium chloride 0.9%, Normal Saline Flush, acetaminophen, ondansetron HCl, glucose **OR** Glucose Date: 4/10/2020  CONCLUSION:  1. Cardiomegaly. Tortuous atherosclerotic aorta 2. Pulmonary arterial hypertension contributing to hilar prominence, unchanged. 3. Prominent perihilar bibasilar markings appear chronic.   No acute pulmonary consolidation    Rise Cox [place] Lawnkeman span two midnight's based on the clinical documentation in H+P. Based on patients current state of illness, I anticipate that, after discharge, patient will require TBD.

## 2020-04-11 NOTE — PLAN OF CARE
Problem: Patient Centered Care  Goal: Patient preferences are identified and integrated in the patient's plan of care  Description  Interventions:  - What would you like us to know as we care for you?   - Provide timely, complete, and accurate informatio influences on pain and pain management  - Manage/alleviate anxiety  - Utilize distraction and/or relaxation techniques  - Monitor for opioid side effects  - Notify MD/LIP if interventions unsuccessful or patient reports new pain  - Anticipate increased sofia needs post-hospital services based on physician/LIP order or complex needs related to functional status, cognitive ability or social support system  Outcome: Not Progressing     Problem: Altered Communication/Language Barrier  Goal: Patient/Family is able

## 2020-04-12 PROCEDURE — 99232 SBSQ HOSP IP/OBS MODERATE 35: CPT | Performed by: INTERNAL MEDICINE

## 2020-04-12 PROCEDURE — 99232 SBSQ HOSP IP/OBS MODERATE 35: CPT | Performed by: HOSPITALIST

## 2020-04-12 NOTE — PROGRESS NOTES
[facility]    Progress Note    Sapphire Gonzalez Patient Status:  Inpatient    1935 MRN [id number]   Location [facility] 4W/SW/SE Attending Julia Yoon MD   Hosp Day # 2 PCP Domo Ross MD         Assessment and Plan: • allopurinol  100 mg Oral Daily   • colchicine  0.6 mg Oral Daily   • Donepezil HCl  5 mg Oral Nightly   • atorvastatin  20 mg Oral Nightly   • hydrALAzine HCl  25 mg Oral Q8H [facility]   • carvedilol  12.5 mg Oral BID with meals   • ceFAZolin  2 g Intraveno (CST): Joshua Burgess MD on 4/10/2020 at 7:27 PM          Ekg 12-lead    Result Date: 4/10/2020  ECG Report  Interpretation  -------------------------- Sinus Tachycardia -Right bundle branch block with left axis -bifascicular block.   -Inferior infarct

## 2020-04-12 NOTE — PROGRESS NOTES
INFECTIOUS DISEASE PROGRESS NOTE    [facility] Patient Status:  Inpatient    1935 MRN [id number]   Location [facility] 4W/SW/SE Attending Lily Guevara MD   Hosp Day # 2 PCP Dave Greenwood MD     Subjective:  ROS done.  Fevers resolve unspecified type     Anemia, unspecified type     Hyperkalemia     Febrile illness     SIRS (systemic inflammatory response syndrome) (HCC)     Fever      ASSESSMENT/PLAN:  Antibiotics: IV cefazolin 311     82year old female with a history of cardiomyopa

## 2020-04-12 NOTE — PROGRESS NOTES
[facility]    Progress Note    Carli Bone Patient Status:  Inpatient    1935 MRN [id number]   Location [facility] 4W/SW/SE Attending Josefina Coulter MD   Hosp Day # 2 PCP Rex Artis MD       Subjective:    Vasiliy Johnson Albuterol Sulfate HFA, traMADol HCl, sodium chloride 0.9%, Normal Saline Flush, acetaminophen, ondansetron HCl, glucose **OR** Glucose-Vitamin C **OR** dextrose **OR** glucose **OR** Glucose-Vitamin C, ondansetron HCl    Results:     Recent Labs   Lab 04 Dictated by (CST): Lili Garcia MD on 4/11/2020 at 4:05 PM     Finalized by (CST): Vida Warren MD on 4/11/2020 at 4:11 PM          Xr Chest Ap Portable  (cpt=71045)    Result Date: 4/10/2020  CONCLUSION:  1. Cardiomegaly.   Tortuous a spent, >50% spent counseling re: treatment plan and workup    MD LYDIA Solares [place] [facility]       **Certification      PHYSICIAN Certification of Need for Inpatient Hospitalization - Initial Certification    Patient will require inpati

## 2020-04-12 NOTE — PLAN OF CARE
Problem: Patient Centered Care  Goal: Patient preferences are identified and integrated in the patient's plan of care  Description  Interventions:  - What would you like us to know as we care for you?  I live at home with my daughter  - Provide timely, co and social influences on pain and pain management  - Manage/alleviate anxiety  - Utilize distraction and/or relaxation techniques  - Monitor for opioid side effects  - Notify MD/LIP if interventions unsuccessful or patient reports new pain  - Anticipate in needs post-hospital services based on physician/LIP order or complex needs related to functional status, cognitive ability or social support system  Outcome: Progressing     Problem: Altered Communication/Language Barrier  Goal: Patient/Family is able to u

## 2020-04-13 PROCEDURE — 99233 SBSQ HOSP IP/OBS HIGH 50: CPT | Performed by: HOSPITALIST

## 2020-04-13 PROCEDURE — 99232 SBSQ HOSP IP/OBS MODERATE 35: CPT | Performed by: INTERNAL MEDICINE

## 2020-04-13 RX ORDER — POTASSIUM CHLORIDE 20 MEQ/1
40 TABLET, EXTENDED RELEASE ORAL ONCE
Status: COMPLETED | OUTPATIENT
Start: 2020-04-13 | End: 2020-04-13

## 2020-04-13 RX ORDER — CEPHALEXIN 500 MG/1
500 CAPSULE ORAL 2 TIMES DAILY
Qty: 14 CAPSULE | Refills: 0 | Status: SHIPPED | OUTPATIENT
Start: 2020-04-13 | End: 2020-04-20

## 2020-04-13 NOTE — WOUND PROGRESS NOTE
WOUND CARE NOTE    History:  Past Medical History:   Diagnosis Date   • Anemia    • Arthritis    • Atherosclerosis of coronary artery    • Back pain    • Cardiomyopathy [facility])    • Carpal tunnel syndrome    • [facility]    • John Paul care, right heel ulcer care    SUBJECTIVE   Hello    OBJECTIVE   RN Consult new right heel ulcer      ASSESSMENT   Hernandez Score:  Hernandez Scale Score: 10    Chart Reviewed: yes    Wound(s):  The pt.  is resting in bed, finishing her breakfast, she wants me t

## 2020-04-13 NOTE — CM/SW NOTE
SW self referral for [facility] RN. Pt admitted 4/10/2020 d/t fever and weakness. Per chart review and multidisciplinary rounds, pt may need some assist with dressing changes to the right heel ulcer at home. Pt has hx w/ [facility]. Referral & F2F placed with Geovanny Smith.

## 2020-04-13 NOTE — DIETARY NOTE
ADULT NUTRITION INITIAL ASSESSMENT    Pt is at high nutrition risk. Pt meets severe malnutrition criteria.       CRITERIA FOR MALNUTRITION DIAGNOSIS:  Criteria for severe malnutrition diagnosis: chronic illness related to wt loss greater than 7.5% in 3 mon selection assistance  - Nutrition education: not appropriate  - Coordination of nutrition care: collaboration with other providers  - Discharge and transfer of nutrition care to new setting or provider: monitor plans    ADMITTING DIAGNOSIS:   SIRS (systemi documented this admission    FOOD/NUTRITION RELATED HISTORY:  Appetite: Poor  Intake: 0-100% of recorded meal trays (3) this admission.   Intake Meeting Needs: No, but supplements to maximize  Food Allergies: No known food allergies listed in chart however LE edema  - Skin Integrity: RN documentation reviewed- persistent redness/breakdown to heels and scab with slough to right inner heel   - Hernandez score: 12 reflects high risk for skin breakdown    NUTRITION PRESCRIPTION:  Diet: Regular/General, Ground and N

## 2020-04-13 NOTE — HOME CARE LIAISON
Received referral from [address] [facility]- patient is alert and oriented x 1. Attempted to reach daughter, Cali Burger- unsuccessful. Left message with call back number to discuss home health services on discharge.

## 2020-04-13 NOTE — CONSULTS
[facility]    Report of Consultation    Maurice Fung Patient Status:  Inpatient    1935 MRN [id number]   Location [facility] 5SW/SE Attending Manuel Bonilla MD   Hosp Day # 3 PCP Noel Uribe MD     Date of Admission  family hx   • Cancer Other         per  family hx      reports that she quit smoking about 44 years ago. Her smoking use included cigarettes. She has never used smokeless tobacco. She reports that she does not drink alcohol or use drugs.     Allergies: TID CC  •  ondansetron HCl (ZOFRAN) injection 4 mg, 4 mg, Intravenous, Q4H PRN    Review of Systems:  Pertinent items are noted in HPI. Physical Exam:  Minimal left facial swelling noted.   No gross swelling or evidence of gross dental abscess on oral ex Fever      A:  82year old female with multiple medical co-morbidities including HTN, CHF, CKD, DM, presents with fever and sepsis. Dental infection likely cause of current sepsis.   She has responded well to IV antibiotics and appears to have improved sin

## 2020-04-13 NOTE — PROGRESS NOTES
INFECTIOUS DISEASE PROGRESS NOTE    Halie Soto Patient Status:  Inpatient    1935 MRN [id number]   Location [facility] 4W/SW/SE Attending Titi Blackwood MD   Hosp Day # 3 PCP Nguyễn Dillon MD     Subjective:  ROS done.  Fevers resolve unspecified CKD stage, unspecified acute renal failure type (HCC)     Leukocytosis, unspecified type     Anemia, unspecified type     Hyperkalemia     Febrile illness     SIRS (systemic inflammatory response syndrome) ([facility])     Fever      ASSESSMENT/PLAN:

## 2020-04-13 NOTE — PLAN OF CARE
Problem: Patient Centered Care  Goal: Patient preferences are identified and integrated in the patient's plan of care  Description  Interventions:  - What would you like us to know as we care for you?  Lives at home with     - Provide timely, compl measures as appropriate and evaluate response  - Consider cultural and social influences on pain and pain management  - Manage/alleviate anxiety  - Utilize distraction and/or relaxation techniques  - Monitor for opioid side effects  - Notify MD/LIP if inte Department for coordinating discharge planning if the patient needs post-hospital services based on physician/LIP order or complex needs related to functional status, cognitive ability or social support system  Outcome: Progressing     Problem: Altered Com

## 2020-04-13 NOTE — SLP NOTE
SPEECH DAILY NOTE - INPATIENT    ASSESSMENT & PLAN   ASSESSMENT    DROPLET MASK AND GLOVES WORN. HANDS WASHED/SANITIZED UPON ENTRANCE/EXIT. SLP f/u for ongoing meal assessment per recommendations of puree/nectar thick per BSE.  SLP reviewed aspiration pr Experiencing Pain: No       Discharge Recommendations  Discharge Recommendations/Plan: Undetermined    Treatment Plan  Treatment Plan/Recommendations: Dysphagia therapy; Aspiration precautions    Interdisciplinary Communication: Discussed with RN        GOA Tung [place] [address] [facility]  Speech Language Pathologist  Phone Number Ext. [phone]

## 2020-04-14 PROBLEM — E46 MALNUTRITION (HCC): Status: ACTIVE | Noted: 2020-04-14

## 2020-04-14 PROCEDURE — 99233 SBSQ HOSP IP/OBS HIGH 50: CPT | Performed by: HOSPITALIST

## 2020-04-14 NOTE — SLP NOTE
SLP f/u for ongoing meal assessment. RN, Kaushik Maldonado, reports pt tolerates diet well with no overt CSA. Pt currently NPO for possible procedure. SLP to f/u as NPO restrictions uplifted, pt safe to resume oral intake, and as schedule permits. Thank you.     B

## 2020-04-14 NOTE — PROGRESS NOTES
[facility]    Progress Note    Baldemar Stanley Patient Status:  Inpatient    1935 MRN [id number]   Location [facility] 4W/SW/SE Attending Faizan Mckenzie MD   Hosp Day # 4 PCP Abbey Sanon MD         Assessment and Plan:

## 2020-04-14 NOTE — PLAN OF CARE
Problem: Patient Centered Care  Goal: Patient preferences are identified and integrated in the patient's plan of care  Description  Interventions:  - What would you like us to know as we care for you?  Lives at home with     - Provide timely, compl social influences on pain and pain management  - Manage/alleviate anxiety  - Utilize distraction and/or relaxation techniques  - Monitor for opioid side effects  - Notify MD/LIP if interventions unsuccessful or patient reports new pain  - Anticipate increa post-hospital services based on physician/LIP order or complex needs related to functional status, cognitive ability or social support system  Outcome: Progressing     Problem: Altered Communication/Language Barrier  Goal: Patient/Family is able to [employer]

## 2020-04-14 NOTE — PROGRESS NOTES
[facility]    Progress Note    John Sanchez Patient Status:  Inpatient    1935 MRN [id number]   Location [facility] 4W/SW/SE Attending Brett Huggins MD   Hosp Day # 4 PCP Nathen Tony MD       Subjective:    Yonny Cuevas traMADol HCl, sodium chloride 0.9%, Normal Saline Flush, acetaminophen, ondansetron HCl, glucose **OR** Glucose-Vitamin C **OR** dextrose **OR** glucose **OR** Glucose-Vitamin C, ondansetron HCl    Results:     Recent Labs   Lab 04/12/20  0173 04/13/20  06 Maxillofacial (UDN=[id number])    Result Date: 4/11/2020  CONCLUSION:  1. Mild inflammatory stranding over the left cheek with no evidence of abscess. 2. Large cavity in left mandibular molar.     Dictated by (CST): Devante Hrerera MD on 4/11/2020 at 4:0 Renée Yusuf, [address]   4/14/2020      Attending Physician Addendum:    I saw and evaluated the patient today and discussed findings, assessment, and plan with our nurse practitioner. I agree with her documentation which has been reviewed.     Spent >35 minutes, >5

## 2020-04-14 NOTE — PROGRESS NOTES
[facility]    Progress Note    Rea Altman Patient Status:  Inpatient    1935 MRN [id number]   Location [facility] 5SW/SE Attending Marya Paredes MD   Hosp Day # 4 PCP Marta Rachel MD        Subjective:     Constitutional: second opinion. She says she will follow up with me after consulting with Dr. Mary Kaur. Awaiting Macey's decision on how to proceed. Discussed above with Dr. Jaden Fam. Will continue to follow.       SIRS (systemic inflammatory response syndrome) (HCC)  S

## 2020-04-14 NOTE — PROGRESS NOTES
ADDENDUM:  Discussed patient with PAULINO Urena. Per Ayanna, Dr. Alivia Greenberg was contacted and spoke to Dr. Juan Lopez today. Dr. Alivia Greenberg requests patient be treated in the hospital instead of outpatient in the office.   Scheduled patient for Multiple Dental Extra

## 2020-04-14 NOTE — PROGRESS NOTES
S: Patient expressed that she is feeling ok and grateful for the visit. O: Patient was sitting in bed watching tv.  A:  provided active listening and spiritual care. P: No additional follow up is needed at this time.        04/14/20 908 Travis Jackson

## 2020-04-15 ENCOUNTER — ANESTHESIA (OUTPATIENT)
Dept: SURGERY | Facility: HOSPITAL | Age: 85
DRG: 872 | End: 2020-04-15
Payer: MEDICARE

## 2020-04-15 ENCOUNTER — ANESTHESIA EVENT (OUTPATIENT)
Dept: SURGERY | Facility: HOSPITAL | Age: 85
DRG: 872 | End: 2020-04-15
Payer: MEDICARE

## 2020-04-15 PROCEDURE — 99232 SBSQ HOSP IP/OBS MODERATE 35: CPT | Performed by: NURSE PRACTITIONER

## 2020-04-15 PROCEDURE — 0CDWXZ1 EXTRACTION OF UPPER TOOTH, MULTIPLE, EXTERNAL APPROACH: ICD-10-PCS | Performed by: DENTIST

## 2020-04-15 PROCEDURE — 0CDXXZ1 EXTRACTION OF LOWER TOOTH, MULTIPLE, EXTERNAL APPROACH: ICD-10-PCS | Performed by: DENTIST

## 2020-04-15 RX ORDER — HYDROCODONE BITARTRATE AND ACETAMINOPHEN 5; 325 MG/1; MG/1
2 TABLET ORAL AS NEEDED
Status: DISCONTINUED | OUTPATIENT
Start: 2020-04-15 | End: 2020-04-15 | Stop reason: HOSPADM

## 2020-04-15 RX ORDER — MORPHINE SULFATE 4 MG/ML
4 INJECTION, SOLUTION INTRAMUSCULAR; INTRAVENOUS EVERY 10 MIN PRN
Status: DISCONTINUED | OUTPATIENT
Start: 2020-04-15 | End: 2020-04-15 | Stop reason: HOSPADM

## 2020-04-15 RX ORDER — HYDROMORPHONE HYDROCHLORIDE 1 MG/ML
0.2 INJECTION, SOLUTION INTRAMUSCULAR; INTRAVENOUS; SUBCUTANEOUS EVERY 5 MIN PRN
Status: DISCONTINUED | OUTPATIENT
Start: 2020-04-15 | End: 2020-04-15 | Stop reason: HOSPADM

## 2020-04-15 RX ORDER — HYDROMORPHONE HYDROCHLORIDE 1 MG/ML
0.4 INJECTION, SOLUTION INTRAMUSCULAR; INTRAVENOUS; SUBCUTANEOUS EVERY 5 MIN PRN
Status: DISCONTINUED | OUTPATIENT
Start: 2020-04-15 | End: 2020-04-15 | Stop reason: HOSPADM

## 2020-04-15 RX ORDER — HALOPERIDOL 5 MG/ML
0.25 INJECTION INTRAMUSCULAR ONCE AS NEEDED
Status: DISCONTINUED | OUTPATIENT
Start: 2020-04-15 | End: 2020-04-15 | Stop reason: HOSPADM

## 2020-04-15 RX ORDER — DEXTROSE MONOHYDRATE 25 G/50ML
50 INJECTION, SOLUTION INTRAVENOUS
Status: DISCONTINUED | OUTPATIENT
Start: 2020-04-15 | End: 2020-04-15 | Stop reason: HOSPADM

## 2020-04-15 RX ORDER — HYDROMORPHONE HYDROCHLORIDE 1 MG/ML
0.6 INJECTION, SOLUTION INTRAMUSCULAR; INTRAVENOUS; SUBCUTANEOUS EVERY 5 MIN PRN
Status: DISCONTINUED | OUTPATIENT
Start: 2020-04-15 | End: 2020-04-15 | Stop reason: HOSPADM

## 2020-04-15 RX ORDER — ONDANSETRON 2 MG/ML
4 INJECTION INTRAMUSCULAR; INTRAVENOUS ONCE AS NEEDED
Status: DISCONTINUED | OUTPATIENT
Start: 2020-04-15 | End: 2020-04-15 | Stop reason: HOSPADM

## 2020-04-15 RX ORDER — PROCHLORPERAZINE EDISYLATE 5 MG/ML
5 INJECTION INTRAMUSCULAR; INTRAVENOUS ONCE AS NEEDED
Status: DISCONTINUED | OUTPATIENT
Start: 2020-04-15 | End: 2020-04-15 | Stop reason: HOSPADM

## 2020-04-15 RX ORDER — LIDOCAINE HYDROCHLORIDE AND EPINEPHRINE 10; 10 MG/ML; UG/ML
INJECTION, SOLUTION INFILTRATION; PERINEURAL AS NEEDED
Status: DISCONTINUED | OUTPATIENT
Start: 2020-04-15 | End: 2020-04-15 | Stop reason: HOSPADM

## 2020-04-15 RX ORDER — SODIUM CHLORIDE, SODIUM LACTATE, POTASSIUM CHLORIDE, CALCIUM CHLORIDE 600; 310; 30; 20 MG/100ML; MG/100ML; MG/100ML; MG/100ML
INJECTION, SOLUTION INTRAVENOUS CONTINUOUS
Status: DISCONTINUED | OUTPATIENT
Start: 2020-04-15 | End: 2020-04-15 | Stop reason: HOSPADM

## 2020-04-15 RX ORDER — HYDROCODONE BITARTRATE AND ACETAMINOPHEN 5; 325 MG/1; MG/1
1 TABLET ORAL AS NEEDED
Status: DISCONTINUED | OUTPATIENT
Start: 2020-04-15 | End: 2020-04-15 | Stop reason: HOSPADM

## 2020-04-15 RX ORDER — MIDAZOLAM HYDROCHLORIDE 1 MG/ML
INJECTION INTRAMUSCULAR; INTRAVENOUS AS NEEDED
Status: DISCONTINUED | OUTPATIENT
Start: 2020-04-15 | End: 2020-04-15 | Stop reason: SURG

## 2020-04-15 RX ORDER — MORPHINE SULFATE 4 MG/ML
2 INJECTION, SOLUTION INTRAMUSCULAR; INTRAVENOUS EVERY 10 MIN PRN
Status: DISCONTINUED | OUTPATIENT
Start: 2020-04-15 | End: 2020-04-15 | Stop reason: HOSPADM

## 2020-04-15 RX ORDER — MORPHINE SULFATE 10 MG/ML
6 INJECTION, SOLUTION INTRAMUSCULAR; INTRAVENOUS EVERY 10 MIN PRN
Status: DISCONTINUED | OUTPATIENT
Start: 2020-04-15 | End: 2020-04-15 | Stop reason: HOSPADM

## 2020-04-15 RX ORDER — METOPROLOL TARTRATE 5 MG/5ML
2.5 INJECTION INTRAVENOUS ONCE
Status: DISCONTINUED | OUTPATIENT
Start: 2020-04-15 | End: 2020-04-15 | Stop reason: HOSPADM

## 2020-04-15 RX ORDER — NALOXONE HYDROCHLORIDE 0.4 MG/ML
80 INJECTION, SOLUTION INTRAMUSCULAR; INTRAVENOUS; SUBCUTANEOUS AS NEEDED
Status: DISCONTINUED | OUTPATIENT
Start: 2020-04-15 | End: 2020-04-15 | Stop reason: HOSPADM

## 2020-04-15 RX ADMIN — MIDAZOLAM HYDROCHLORIDE 1 MG: 1 INJECTION INTRAMUSCULAR; INTRAVENOUS at 13:26:00

## 2020-04-15 RX ADMIN — MIDAZOLAM HYDROCHLORIDE 1 MG: 1 INJECTION INTRAMUSCULAR; INTRAVENOUS at 13:30:00

## 2020-04-15 NOTE — ANESTHESIA PREPROCEDURE EVALUATION
Anesthesia PreOp Note    HPI:     Robert Coleman is a 82year old female who presents for preoperative consultation requested by: Sergey Dominguez DDS    Date of Surgery: 4/10/2020 - 4/15/2020    Procedure(s):  DENTAL TOOTH EXTRACTION-MULTIPLE  Indicatio GFR 30-59 ml/min (HCC)         Date Noted: 09/23/2014      Anemia         Date Noted: 06/10/2014      Chronic airway obstruction [facility])         Date Noted: 06/10/2014      Hyperlipidemia         Date Noted: 06/10/2014      Essential hypertension         Justin daily., Disp: 30 tablet, Rfl: 1, 1/30/2020 at Unknown time  hydrALAzine HCl 50 MG Oral Tab, Take 1 tablet (50 mg total) by mouth every 8 (eight) hours.  (Patient taking differently: Take 100 mg by mouth every 8 (eight) hours.  ), Disp: 90 tablet, Rfl: 2, 1/ injection 1-7 Units, 1-7 Units, Subcutaneous, 4 times per day, Marya Paredes MD  [facility] Hold] nystatin (MYCOSTATIN) suspension 500,000 Units, 5 mL, Oral, QID, [place], Juve BURNS MD, 500,000 Units at 04/14/20 2119  [facility] Hold] collagenase (SANTYL) 250 UNIT/GM oi oral liquid 15 g, 15 g, Oral, Q15 Min PRN, Ambrocio Rodriguez MD    Or  [MAR Hold] Glucose-Vitamin C (DEX-4) chewable tab 4 tablet, 4 tablet, Oral, Q15 Min PRN, Ambrocio Rdoriguez MD    Or  [facility] Hold] dextrose 50 % injection 50 mL, 50 mL, Intravenous, Q15 Min Drug use: No      Sexual activity: Not on file    Lifestyle      Physical activity:        Days per week: Not on file        Minutes per session: Not on file      Stress: Not on file    Relationships      Social connections:        Talks on phone: Not on f oral temperature is 97.3 °F (36.3 °C). Her blood pressure is 107/53 and her pulse is 78. Her respiration is 14 and oxygen saturation is 100%.     04/14/20  2114 04/15/20  0644 04/15/20  0900 04/15/20  1206   BP: 100/55 112/57 116/55 107/53   Pulse:  82  78 alternative forms of anesthetic management. All of the patient's questions were answered to the best of my ability. The patient desires the anesthetic management as planned.   Mart Bautista  4/15/2020 1:18 PM

## 2020-04-15 NOTE — PROGRESS NOTES
Patient remains NPO for dental extractions this afternoon at 1pm.  Will hold this date and follow up next session.     Raymundo Acosta M.S., [address] [place] [address]

## 2020-04-15 NOTE — ANESTHESIA POSTPROCEDURE EVALUATION
Patient: Leroy Ramos    Procedure Summary     Date:  04/15/20 Room / Location:  [facility] MAIN [address] MAIN OR    Anesthesia Start:  7601 Anesthesia Stop:  6069    Procedure:  DENTAL TOOTH EXTRACTION-MULTIPLE (N/A Mouth) Diagnosis:       Infection      (I

## 2020-04-15 NOTE — PROGRESS NOTES
[facility]    Progress Note    Adilene Cerda Patient Status:  Inpatient    1935 MRN [id number]   Location [facility] 4W/SW/SE Attending Magdalena Couch MD   Hosp Day # 5 PCP James Perry MD       Subjective:    Donn Hunter 2 g Intravenous Q12H   • [MAR Hold] Heparin Sodium (Porcine)  5,000 Units Subcutaneous 2 times per day       Current PRN Inpatient Meds:      glucose **OR** Glucose-Vitamin C **OR** dextrose **OR** glucose **OR** Glucose-Vitamin C, HYDROcodone-acetaminophe 07/26/2017    DDIMER 1.09 (H) 04/13/2020    ESRML >145 (H) 02/03/2020    CRP 13.30 (H) 04/13/2020     (H) 09/30/2013    MG 2.1 04/13/2020    PHOS 2.3 (L) 04/13/2020    TROP <0.045 04/11/2020    CK 30 04/11/2020    B12 477 06/27/2018    LDL 39 05/23/ following  Respiratory panel negative  COVID 19 testing neg  Blood cultures pending, no growth in 3 days  CXR stable, no acute issues  UA not suspicious for UTI  SLP working with patient  Asked OMF to see patient  Cardiology clearance for dental extraction

## 2020-04-15 NOTE — BRIEF OP NOTE
Pre-Operative Diagnosis: Infection [B99.9]    Multiple Infected Teeth    Post-Operative Diagnosis: Infection [B99.9]   Multiple Infected Teeth  Procedure Performed:   Procedure(s):  extraction of multiple teeth    Surgeon(s) and Role:     * Juan Miguel So

## 2020-04-16 VITALS
BODY MASS INDEX: 22 KG/M2 | HEART RATE: 83 BPM | OXYGEN SATURATION: 100 % | RESPIRATION RATE: 18 BRPM | TEMPERATURE: 98 F | WEIGHT: 115.81 LBS | DIASTOLIC BLOOD PRESSURE: 46 MMHG | SYSTOLIC BLOOD PRESSURE: 119 MMHG

## 2020-04-16 PROCEDURE — 99239 HOSP IP/OBS DSCHRG MGMT >30: CPT | Performed by: HOSPITALIST

## 2020-04-16 RX ORDER — TRAMADOL HYDROCHLORIDE 50 MG/1
50 TABLET ORAL EVERY 12 HOURS PRN
Qty: 30 TABLET | Refills: 0 | Status: ON HOLD | OUTPATIENT
Start: 2020-04-16 | End: 2020-05-21

## 2020-04-16 NOTE — PHYSICAL THERAPY NOTE
PHYSICAL THERAPY EVALUATION - INPATIENT     Room Number: 533/533-A  Evaluation Date: 4/16/2020  Type of Evaluation: Initial           Reason for Therapy: Mobility Dysfunction and Discharge Planning    PHYSICAL THERAPY ASSESSMENT     Patient is a 82 year o [facility])      Past Medical History  Past Medical History:   Diagnosis Date   • Anemia    • Arthritis    • Atherosclerosis of coronary artery    • Back pain    • Cardiomyopathy [facility])    • Carpal tunnel syndrome    • CHF (congestive heart failure) ([facility]    • Co MOBILITY  How much difficulty does the patient currently have. ..  -   Turning over in bed (including adjusting bedclothes, sheets and blankets)?: None   -   Sitting down on and standing up from a chair with arms (e.g., wheelchair, bedside commode, etc.): A

## 2020-04-16 NOTE — PROGRESS NOTES
[facility]    Progress Note    Kurt Chew Patient Status:  Inpatient    1935 MRN [id number]   Location [facility] 5SW/SE Attending Alexandre Resendiz, [address] [facility] Day # 6 PCP Bg May MD        Subjective:     Constitut BILT 0.6 04/10/2020    TP 6.9 04/10/2020    AST 13 (L) 04/10/2020    ALT <6 (L) 04/10/2020    PTT 35.5 (H) 02/04/2020    INR 1.06 04/14/2020    TSH 0.552 02/01/2020    LIP 21 (L) 07/26/2017    DDIMER 1.09 (H) 04/13/2020    ESRML >145 (H) 02/03/2020    CRP

## 2020-04-16 NOTE — SLP NOTE
SPEECH DAILY NOTE - INPATIENT    ASSESSMENT & PLAN   ASSESSMENT      PPE REQUIRED. THIS SLP WORE GLOVES AND DROPLET MASK. HANDS SANITIZED/WASHED UPON ENTRANCE/EXIT. Pt alert, afebrile and on room air.  Pt seen for swallow analysis per BSE recommendatio precautions/strategies to improve safety/efficiency of the swallow. Will monitor for any new CXR results. RN to communicate Pt's swallowing status with family d/t current visitor restrictions.                     Diet Recommendations - Solids: Mechanical so including Slow rate, Small bites, CONTROLLED LIQUIDS,  Alternate liquids/solids, No straws, Upright 90 degrees with moderate feeding assistance 90 % of the time across 3 sessions. Pt seen upright in chair self-feeding oral trials.  Swallowing precautions

## 2020-04-16 NOTE — PROGRESS NOTES
Called daughter King Moreno no answer did leave a message to call this RN back to discuss dc papers. 36 Was able to get a hold of daughter mark, aware of dc plans. Patient dc home. IV removed by RN [facility]. Understands to follow up with PCP in 1 week.

## 2020-04-16 NOTE — OCCUPATIONAL THERAPY NOTE
OCCUPATIONAL THERAPY EVALUATION - INPATIENT     Room Number: 533/533-A  Evaluation Date: 4/16/2020  Type of Evaluation: Initial  Presenting Problem: (tachy, fever)    Physician Order: IP Consult to Occupational Therapy  Reason for Therapy: ADL/IADL Dysfunc is unable to provide social history. At this time she requires significant amount of assist for mobility and self-care tasks.    The patient's Approx Degree of Impairment: 56.46% has been calculated based on documentation in the [facility] '6 clicks' Inpatient D • LUMBAR INTERLAMINAR EPIDURAL INJECTION N/A 8/3/2017    Performed by Saeid Pedroza MD at [address] [place] [address]  Type of Home: House  Home Layout: One level  Lives With: Family        Shower/Tub and Equipment: Tub-shower combo  Other Equi personal grooming such as brushing teeth?: A Little  -   Eating meals?: A Little    AM-PAC Score:  Score: 15  Approx Degree of Impairment: 56.46%  Standardized Score (AM-PAC Scale): 34.69  CMS Modifier (G-Code): CK    FUNCTIONAL TRANSFER ASSESSMENT  Supine

## 2020-04-16 NOTE — HOME CARE LIAISON
Received referral from Autumn Monroe. Per request spoke with patient's daughter Hakeem Ng, at this time patient and family refusing home health services. Autumn Monroe  notified.  Thank you for this referral.

## 2020-04-16 NOTE — CM/SW NOTE
PT/OT rec SNF. SW LVM for pt's dtr Franki Molina ([phone]) to discuss SNF placement and choices. SW spoke to pt's grandtr Brad Aguirre ([phone]) and they are refusing SNF placement.    Dtr Franki Molina confirms that they do not want SNF placement despite awareness lakesha

## 2020-04-16 NOTE — OPERATIVE REPORT
[facility]    PATIENT'S NAME: Holly uQintero   ATTENDING PHYSICIAN: Cooper Shepard MD   OPERATING PHYSICIAN: Cruz Carrillo DDS   PATIENT ACCOUNT#:   [de-identified]    LOCATION:  [address] [place] [address] #:   [id number]       DATE OF BIR then to the anterior mandible. Teeth #23 and 26 were found to be grossly loose and nonrestorable. Teeth #23 and 26 were atraumatically removed using dental elevators and forceps.   Incision wounds were present, both on the right maxilla and left mandible

## 2020-04-16 NOTE — PLAN OF CARE
Pt wound care completed. Pt given tylenol for mouth pain crushed in thickened water. Pt had dental extraction done today. Plan is for PT to evaluate pt before discharge. Will continue to monitor.        Problem: Patient Centered Care  Goal: Patient preferen appropriate pain scale  - Administer analgesics based on type and severity of pain and evaluate response  - Implement non-pharmacological measures as appropriate and evaluate response  - Consider cultural and social influences on pain and pain management Complete POLST form as appropriate  - Assess patient's ability to be responsible for managing their own health  - Refer to Case Management Department for coordinating discharge planning if the patient needs post-hospital services based on physician/LIP ord

## 2020-04-16 NOTE — DISCHARGE SUMMARY
[place] [facility] - [facility]    Discharge Summary    Gladysraad Whitaker Patient Status:  Inpatient    1935 MRN [id number]   Location [facility] 5SW/SE Attending Fiona Sawant, [address] [facility] Day # 6 PCP Sho Akbar MD     Date of Admission: 4/10/20 rhythm  Abd: Abdomen soft, nontender, nondistended, bowel sounds present  Neuro: No acute focal deficits  MSK: Full range of motion in extremities  Skin: Warm and dry  Psych: Normal affect  Ext: no c/c/e    History of Present Illness:  The patient is an 82- refused. Wants to take patient home with [facility].     Vitals: Blood pressure 119/51, pulse 79, temperature 97.4 °F (36.3 °C), temperature source Oral, resp. rate 16, weight 115 lb 12.8 oz (52.5 kg), SpO2 100 %, not currently breastfeeding. Follow up:    Blas Barrera take      Take 1 tablet (50 mg total) by mouth every 8 (eight) hours.    Quantity:  90 tablet  Refills:  2        CONTINUE taking these medications      Instructions Prescription details   Albuterol Sulfate  (90 Base) MCG/ACT Aers      Inhale 2 puffs Tabs  Commonly known as:  DEMADEX              Where to Get Your Medications      Please  your prescriptions at the location directed by your doctor or nurse    Bring a paper prescription for each of these medications  · cephALEXin 500 MG Caps  · co

## 2020-04-16 NOTE — PROGRESS NOTES
INFECTIOUS DISEASE PROGRESS NOTE    Priraad Victoria Patient Status:  Inpatient    1935 MRN [id number]   Location [facility] 4W/SW/SE Attending Charmayne Cranker, MD   Hosp Day # 6 PCP Sho Akbar MD     Subjective:  ROS done.  Fevers resolve superimposed on chronic kidney disease, unspecified CKD stage, unspecified acute renal failure type (HCC)     Leukocytosis, unspecified type     Anemia, unspecified type     Hyperkalemia     Febrile illness     SIRS (systemic inflammatory response syndrome

## 2020-04-20 ENCOUNTER — PATIENT OUTREACH (OUTPATIENT)
Dept: CASE MANAGEMENT | Age: 85
End: 2020-04-20

## 2020-04-24 ENCOUNTER — VIRTUAL PHONE E/M (OUTPATIENT)
Dept: CARDIOLOGY CLINIC | Facility: HOSPITAL | Age: 85
End: 2020-04-24
Attending: NURSE PRACTITIONER
Payer: MEDICARE

## 2020-04-24 DIAGNOSIS — R41.0 CONFUSION: ICD-10-CM

## 2020-04-24 DIAGNOSIS — I50.9 ACUTE ON CHRONIC CONGESTIVE HEART FAILURE, UNSPECIFIED HEART FAILURE TYPE (HCC): Primary | ICD-10-CM

## 2020-04-24 PROCEDURE — 99443 PR TELEPHONE EVAL AND MGNT EST PATIENT 21-30 MIN MEDICAL DISCUSSION: CPT | Performed by: NURSE PRACTITIONER

## 2020-04-24 NOTE — PATIENT INSTRUCTIONS
Restart Torsemide 20 mg daily    Continue all other medications    Contact Dr. Romain Cosby office for follow up visit within the next month    Follow up with specialty care clinic as needed or directed

## 2020-04-24 NOTE — PROGRESS NOTES
Virtual Telephone Check-In    Filiberto Vivas verbally consents to a Virtual/Telephone Check-In visit on 04/24/20. Patient understands and accepts financial responsibility for any deductible, co-insurance and/or co-pays associated with this service.     D month    Follow up with specialty care clinic as needed or directed    TIM TEE, 04/24/20, 1:55 PM

## 2020-04-24 NOTE — PROGRESS NOTES
HPI:    Patient ID: Suellen Arias is a 82year old female.     HPI    Review of Systems         Current Outpatient Medications   Medication Sig Dispense Refill   • traMADol HCl 50 MG Oral Tab Take 1 tablet (50 mg total) by mouth every 12 (twelve) hours as PHYSICAL EXAM:   Physical Exam           ASSESSMENT/PLAN:   No diagnosis found. No orders of the defined types were placed in this encounter.       Meds This Visit:  Requested Prescriptions      No prescriptions requested or ordered in this encounter

## 2020-05-08 RX ORDER — TRAMADOL HYDROCHLORIDE 50 MG/1
TABLET ORAL
Qty: 28 TABLET | Refills: 0 | OUTPATIENT
Start: 2020-05-08

## 2020-05-14 ENCOUNTER — HOSPITAL ENCOUNTER (INPATIENT)
Facility: HOSPITAL | Age: 85
LOS: 6 days | Discharge: HOSPICE | DRG: 593 | End: 2020-05-21
Attending: EMERGENCY MEDICINE | Admitting: HOSPITALIST
Payer: MEDICARE

## 2020-05-14 ENCOUNTER — APPOINTMENT (OUTPATIENT)
Dept: GENERAL RADIOLOGY | Facility: HOSPITAL | Age: 85
DRG: 593 | End: 2020-05-14
Attending: EMERGENCY MEDICINE
Payer: MEDICARE

## 2020-05-14 DIAGNOSIS — A41.9 SEPSIS, DUE TO UNSPECIFIED ORGANISM, UNSPECIFIED WHETHER ACUTE ORGAN DYSFUNCTION PRESENT (HCC): ICD-10-CM

## 2020-05-14 DIAGNOSIS — L03.319 CELLULITIS OF SACRAL REGION: Primary | ICD-10-CM

## 2020-05-14 DIAGNOSIS — L03.119 CELLULITIS OF HEEL: ICD-10-CM

## 2020-05-14 PROCEDURE — 73650 X-RAY EXAM OF HEEL: CPT | Performed by: EMERGENCY MEDICINE

## 2020-05-14 PROCEDURE — 72170 X-RAY EXAM OF PELVIS: CPT | Performed by: EMERGENCY MEDICINE

## 2020-05-14 PROCEDURE — 99223 1ST HOSP IP/OBS HIGH 75: CPT | Performed by: HOSPITALIST

## 2020-05-14 RX ORDER — VANCOMYCIN HYDROCHLORIDE
25 ONCE
Status: COMPLETED | OUTPATIENT
Start: 2020-05-14 | End: 2020-05-15

## 2020-05-15 PROBLEM — L03.319 CELLULITIS OF SACRAL REGION: Status: ACTIVE | Noted: 2020-05-15

## 2020-05-15 PROBLEM — L03.119 CELLULITIS OF HEEL: Status: ACTIVE | Noted: 2020-05-15

## 2020-05-15 PROBLEM — A41.9 SEPSIS (HCC): Status: ACTIVE | Noted: 2020-05-15

## 2020-05-15 PROBLEM — A41.9 SEPSIS, DUE TO UNSPECIFIED ORGANISM, UNSPECIFIED WHETHER ACUTE ORGAN DYSFUNCTION PRESENT (HCC): Status: ACTIVE | Noted: 2020-05-15

## 2020-05-15 PROCEDURE — 99233 SBSQ HOSP IP/OBS HIGH 50: CPT | Performed by: HOSPITALIST

## 2020-05-15 RX ORDER — SPIRONOLACTONE 25 MG/1
25 TABLET ORAL DAILY
Status: DISCONTINUED | OUTPATIENT
Start: 2020-05-15 | End: 2020-05-21

## 2020-05-15 RX ORDER — HYDRALAZINE HYDROCHLORIDE 50 MG/1
50 TABLET, FILM COATED ORAL EVERY 8 HOURS SCHEDULED
Status: DISCONTINUED | OUTPATIENT
Start: 2020-05-15 | End: 2020-05-16

## 2020-05-15 RX ORDER — ACETAMINOPHEN 325 MG/1
650 TABLET ORAL EVERY 6 HOURS PRN
Status: DISCONTINUED | OUTPATIENT
Start: 2020-05-15 | End: 2020-05-21

## 2020-05-15 RX ORDER — LATANOPROST 50 UG/ML
1 SOLUTION/ DROPS OPHTHALMIC 2 TIMES DAILY
Status: DISCONTINUED | OUTPATIENT
Start: 2020-05-15 | End: 2020-05-15

## 2020-05-15 RX ORDER — TRAMADOL HYDROCHLORIDE 50 MG/1
50 TABLET ORAL EVERY 12 HOURS PRN
Status: DISCONTINUED | OUTPATIENT
Start: 2020-05-15 | End: 2020-05-21

## 2020-05-15 RX ORDER — HEPARIN SODIUM 5000 [USP'U]/ML
5000 INJECTION, SOLUTION INTRAVENOUS; SUBCUTANEOUS EVERY 12 HOURS
Status: DISCONTINUED | OUTPATIENT
Start: 2020-05-15 | End: 2020-05-21

## 2020-05-15 RX ORDER — SODIUM CHLORIDE 450 MG/100ML
INJECTION, SOLUTION INTRAVENOUS CONTINUOUS
Status: DISCONTINUED | OUTPATIENT
Start: 2020-05-15 | End: 2020-05-15

## 2020-05-15 RX ORDER — CARVEDILOL 12.5 MG/1
12.5 TABLET ORAL 2 TIMES DAILY WITH MEALS
Status: DISCONTINUED | OUTPATIENT
Start: 2020-05-15 | End: 2020-05-17

## 2020-05-15 RX ORDER — HYDRALAZINE HYDROCHLORIDE 20 MG/ML
10 INJECTION INTRAMUSCULAR; INTRAVENOUS EVERY 4 HOURS PRN
Status: DISCONTINUED | OUTPATIENT
Start: 2020-05-15 | End: 2020-05-21

## 2020-05-15 RX ORDER — ATORVASTATIN CALCIUM 20 MG/1
20 TABLET, FILM COATED ORAL NIGHTLY
Status: DISCONTINUED | OUTPATIENT
Start: 2020-05-15 | End: 2020-05-21

## 2020-05-15 RX ORDER — DONEPEZIL HYDROCHLORIDE 5 MG/1
5 TABLET, FILM COATED ORAL NIGHTLY
Status: DISCONTINUED | OUTPATIENT
Start: 2020-05-15 | End: 2020-05-21

## 2020-05-15 RX ORDER — SPIRONOLACTONE 25 MG/1
25 TABLET ORAL DAILY
Status: ON HOLD | COMMUNITY
End: 2020-05-21

## 2020-05-15 RX ORDER — HALOPERIDOL 5 MG/ML
5 INJECTION INTRAMUSCULAR EVERY 6 HOURS PRN
Status: DISCONTINUED | OUTPATIENT
Start: 2020-05-15 | End: 2020-05-21

## 2020-05-15 RX ORDER — LATANOPROST 50 UG/ML
1 SOLUTION/ DROPS OPHTHALMIC NIGHTLY
Status: DISCONTINUED | OUTPATIENT
Start: 2020-05-15 | End: 2020-05-21

## 2020-05-15 RX ORDER — ONDANSETRON 2 MG/ML
4 INJECTION INTRAMUSCULAR; INTRAVENOUS EVERY 6 HOURS PRN
Status: DISCONTINUED | OUTPATIENT
Start: 2020-05-15 | End: 2020-05-21

## 2020-05-15 RX ORDER — DORZOLAMIDE HYDROCHLORIDE AND TIMOLOL MALEATE 20; 5 MG/ML; MG/ML
1 SOLUTION/ DROPS OPHTHALMIC 2 TIMES DAILY
Status: DISCONTINUED | OUTPATIENT
Start: 2020-05-15 | End: 2020-05-21

## 2020-05-15 RX ORDER — DEXTROSE MONOHYDRATE 25 G/50ML
50 INJECTION, SOLUTION INTRAVENOUS
Status: DISCONTINUED | OUTPATIENT
Start: 2020-05-15 | End: 2020-05-21

## 2020-05-15 RX ORDER — TORSEMIDE 20 MG/1
40 TABLET ORAL 2 TIMES DAILY
Status: ON HOLD | COMMUNITY
End: 2020-05-20

## 2020-05-15 NOTE — PROGRESS NOTES
[facility] Dosing Service    Initial Pharmacokinetic Consult for Vancomycin Dosing     Skye Lambert is a 82year old female who is being treated for cellulitis.   Pharmacy has been asked to dose Vancomycin by Dr. Marlyn Shah      She is allergic to meloxicam a

## 2020-05-15 NOTE — PLAN OF CARE
Problem: Patient Centered Care  Goal: Patient preferences are identified and integrated in the patient's plan of care  Description  Interventions:  - What would you like us to know as we care for you?  Patient here from home for worsening sacral bed sore integrity  - Monitor for areas of redness and/or skin breakdown  - Initiate interventions, skin care algorithm/standards of care as needed  Outcome: Progressing     Problem: RISK FOR INFECTION - ADULT  Goal: Absence of fever/infection during anticipated ne

## 2020-05-15 NOTE — ED NOTES
Instructed by MD to start antibiotics without cultures. Numerous attempts to achieve IV access including use of ultrasound. 2 techs attempted to obtain cultures. Charge RN and MD were notified and phlebotomy was called. See note from charge RN [address]

## 2020-05-15 NOTE — CM/SW NOTE
Case Management/Readmission Note:4/10/2020 - 4/16/2020     Patient recent admission to [address] [place] [address] , on discharge, family declined rehabilitation at the time of dischage and decided to go home with family and [facility].      This CM spoke with daughter  Doe Michel  [phone]

## 2020-05-15 NOTE — ED NOTES
Orders for admission, patient is aware of plan and ready to go upstairs. Any questions, please call ED ORA ceron  at extension [phone]      Alert & oriented x2. Wound to sacrum and R heel.

## 2020-05-15 NOTE — PLAN OF CARE
Problem: Patient Centered Care  Goal: Patient preferences are identified and integrated in the patient's plan of care  Description  Interventions:  - What would you like us to know as we care for you?  Patient here from home for worsening sacral bed sore indicated by assessment.  - Educate pt/family on patient safety including physical limitations  - Instruct pt to call for assistance with activity based on assessment  - Modify environment to reduce risk of injury  - Provide assistive devices as appropriat

## 2020-05-15 NOTE — WOUND PROGRESS NOTE
WOUND CARE NOTE    History:  Past Medical History:   Diagnosis Date   • Anemia    • Arthritis    • Atherosclerosis of coronary artery    • Back pain    • Cardiomyopathy [facility])    • Carpal tunnel syndrome    • CHF (congestive heart failure) ([facility]    • John Paul 13    Chart Reviewed: yes    Wound(s):  Pt was seen laying in bed, heels elevated on a pillow. Pt was drowsy but agreeable to assessment. The right heel dressing was removed.  There is an unstageable ulcer with dry eschar, loosened edges and the melissa wound

## 2020-05-15 NOTE — PHYSICAL THERAPY NOTE
Attempted PT evaluation. Nursing agreed to PT, but pt. Sleeping and unable to awaken despite multiple attempts. Nursing notified.

## 2020-05-15 NOTE — ED INITIAL ASSESSMENT (HPI)
Patient brought in by EMS for pressure ulcer to buttocks and R heel that has been getting worse. Family has been treating at home.

## 2020-05-15 NOTE — ED NOTES
Called phlebotomy @ [phone], [phone] and [phone] with messages left and no return phone call. Patient was stuck multiple times by ED staff to obtain 2 sets of blood cultures.  Dr Efren Henao notified and informed Candice Rajan and primary RN to start antibiotics without o

## 2020-05-15 NOTE — ED PROVIDER NOTES
Patient Seen in: [facility] AND [facility] Emergency Department    History   Patient presents with:  Wound    Stated Complaint: pressure ulcers     HPI    83 yo F with PMH CAD, CHF (25% EF), HTN, HL presenting for evalaution of one week of sacral and right heel wo 100 mg by mouth every 8 (eight) hours. Donepezil HCl 5 MG Oral Tab,  Take 1 tablet (5 mg total) by mouth nightly. carvedilol 12.5 MG Oral Tab,  Take 1 tablet (12.5 mg total) by mouth 2 (two) times daily with meals.   Patient taking differently: Take 2 for syncope and headaches. Positive for stated complaint: pressure ulcers  Other systems are as noted in HPI. Constitutional and vital signs reviewed. All other systems reviewed and negative except as noted above.     PSFH elements reviewed from t created for panel order CBC WITH DIFFERENTIAL WITH PLATELET.   Procedure                               Abnormality         Status                     ---------                               -----------         ------                     CBC W/ DIFFERENTIAL[ Finalized by (CST): Jh Warren MD on 5/14/2020 at 10:21 PM            [facility]      Sepsis Reassessment Note    /61 (BP Location: Right arm)   Pulse 102   Temp 98.1 °F (36.7 °C) (Oral)   Resp 18   Ht 154.9 cm (5' 0.98\" [facility])    Disposition:  Admit    Follow-up:  No follow-up provider specified.     Medications Prescribed:  Current Discharge Medication List

## 2020-05-15 NOTE — H&P
[id number] [facility] Patient Status:  Emergency    1935 MRN [id number]   Location [address] Attending Devante Corbett MD   Hosp Day # 0 PCP Eliu Renee MD     Date:   Lanie De Jesus MD at [facility] MAIN OR     Family History   Problem Relation Age of Onset   • Hypertension Other         per  family hx   • Diabetes Other         per  family hx   • Cancer Other         per  family hx      reports that she quit smoking about 42 Sig: Take 1 tablet (50 mg total) by mouth every 8 (eight) hours. Patient taking differently: Take 100 mg by mouth every 8 (eight) hours.      latanoprost (XALATAN) 0.005 % Ophthalmic Solution   Yes No   Sig: Place 1 drop into both eyes 2 (two) times alton motion. normal strength. Feet:  Normal pulses. Neurologic:  Alert, no focal deficits, cranial nerves II-XII are grossly intact. Cognition and Speech: Poor content  Psychiatric:  Cooperative, appropriate mood & affect.       Laboratory Data:   Lab Result needed    Prophylaxis  Subcutaneous heparin    CODE STATUS  Full    Primary care physician  James Perry MD    Disposition  Clinical course will dictate outcome      Augustina Cody MD  5/14/2020  10:38 PM

## 2020-05-16 PROCEDURE — 99233 SBSQ HOSP IP/OBS HIGH 50: CPT | Performed by: HOSPITALIST

## 2020-05-16 PROCEDURE — 30233N1 TRANSFUSION OF NONAUTOLOGOUS RED BLOOD CELLS INTO PERIPHERAL VEIN, PERCUTANEOUS APPROACH: ICD-10-PCS | Performed by: HOSPITALIST

## 2020-05-16 RX ORDER — SODIUM CHLORIDE 9 MG/ML
INJECTION, SOLUTION INTRAVENOUS ONCE
Status: COMPLETED | OUTPATIENT
Start: 2020-05-16 | End: 2020-05-16

## 2020-05-16 RX ORDER — ALBUMIN, HUMAN INJ 5% 5 %
250 SOLUTION INTRAVENOUS ONCE
Status: COMPLETED | OUTPATIENT
Start: 2020-05-16 | End: 2020-05-16

## 2020-05-16 RX ORDER — MULTIPLE VITAMINS W/ MINERALS TAB 9MG-400MCG
1 TAB ORAL DAILY
Status: DISCONTINUED | OUTPATIENT
Start: 2020-05-16 | End: 2020-05-21

## 2020-05-16 NOTE — CM/SW NOTE
PT is rec home supplies including hospital bed with air matress,w/c and lift equip. Rec HHC ref to provide teaching and support to cg on use of equip. Ref is open with [facility], Melissa will follow up to have home team assess needs.     MD will need to document the provided in the home. AND please lucas (X) for F or G  ( ) F. The beneficiary has sufficient physical and mental capabilities needed to safely self-propel the wheelchair that is provided in the home during a typical day. ( ) G.  The beneficiary has a c

## 2020-05-16 NOTE — PHYSICAL THERAPY NOTE
Chart reviewed, pt with acute anemia, Hg 6.7 this AM. Not appropriate for mobility at this time. Will f/u later as appropriate, schedule permitting. Per case management note on 5/15, pt has been bed bound since Jan with assist for all ADLs.  Anticipate pt d

## 2020-05-16 NOTE — DIETARY NOTE
ADULT NUTRITION INITIAL ASSESSMENT    Pt is at high nutrition risk. Pt meets moderate malnutrition criteria.       CRITERIA FOR MALNUTRITION DIAGNOSIS:  Criteria for non-severe malnutrition diagnosis: chronic illness related to energy intake less than75% f region [T39.647]  Sepsis, due to unspecified organism, unspecified whether acute organ dysfunction present ([facility] [A41.9]    PERTINENT PAST MEDICAL HISTORY: Reviewed  Past Medical History:   Diagnosis Date   • Anemia    • Arthritis    • Atherosclerosis of c difficulty chewing and dysphagia   BM documented. FOOD/NUTRITION RELATED HISTORY:  Appetite: Poor (History of poor appetite)  Intake: 2 meals documented: 20% and 70%.      Intake Meeting Needs: No, but supplements to maximize    Food Allergies: History of Supplements: Add ONS TID.      ESTIMATED NUTRITION NEEDS:  Calories: 0956-4108 calories/day (30-35 calories per kg Current wt)  (Using 50 kg)   Protein: 75 grams protein/day (1.5 grams protein per kg Current wt)  Fluid needs: 1500 ml (1 ml/kcal baseline) --

## 2020-05-16 NOTE — HOME CARE LIAISON
Received referral from [address] [place] [address]. Per request spoke w/ patient's daughter El Cabrera, confirmed agreeable to [address] [place] [address] , pending orders. All questions addressed and answered.      Patient will need a face to face entered prior to dischar

## 2020-05-16 NOTE — PROGRESS NOTES
[place] [facility] - [facility]    Progress Note    Scott Álvarez Patient Status:  Inpatient    1935 MRN [id number]   Location [facility] 5SW/SE Attending Dhaval Mcgrath MD   Hosp Day # 0 PCP Nadeen Ceron MD       SUBJECTIVE:    Pain in but examination.      Dictated by (CST): Froylan Warren MD on 5/14/2020 at 10:20 PM     Finalized by (CST): Froylan Warren MD on 5/14/2020 at 10:21 PM            Meds:   Dorzolamide HCl-Timolol Mal (Dorzolamide-Timolol) 22.3-6.8 MG/ML ophthalm Once          Assessment  Patient Active Problem List:     Anemia     Chronic airway obstruction (HCC)     Cardiovascular disease     Essential hypertension     Hyperlipidemia     Congestive heart failure (HCC)     CKD (chronic kidney disease) stage 3, GFR malnutrition  - dietician eval     Chronic anemia  No signs of obvious bleeding, monitor as needed     Prophylaxis  Subcutaneous heparin     CODE STATUS  Full     Primary care physician  Kuldip Barros MD     Disposition  Clinical course will dictate outco

## 2020-05-16 NOTE — PLAN OF CARE
Problem: ALTERED NUTRIENT INTAKE - ADULT  Goal: Nutrient intake appropriate for improving, restoring or maintaining nutritional needs  Description  INTERVENTIONS:  - Assess nutritional status and recommend course of action  - Monitor oral intake, labs, a Subjective:       Patient ID: Samanta Argueta is a 37 y.o. female.    Chief Complaint: Cough    Patient presents with cough and chest tightness that started about 2 days ago.      Cough   This is a new problem. The current episode started in the past 7 days (about 2 days ago. ). The problem has been unchanged. The cough is non-productive. Associated symptoms include chest pain (chest tightness ) and a sore throat. Pertinent negatives include no chills, rash or shortness of breath. Exacerbated by: some activity  Treatments tried: mucinex. The treatment provided no relief.     Review of Systems   Constitutional: Negative for chills and fatigue.   HENT: Positive for sore throat.    Respiratory: Positive for cough and chest tightness. Negative for shortness of breath.    Cardiovascular: Positive for chest pain (chest tightness ).   Musculoskeletal: Negative for arthralgias and gait problem.   Skin: Negative for color change and rash.   Psychiatric/Behavioral: Negative for agitation and confusion.       Objective:      Physical Exam   Constitutional: She is oriented to person, place, and time. Vital signs are normal. She appears well-developed and well-nourished.   HENT:   Head: Normocephalic and atraumatic.   Right Ear: Tympanic membrane normal.   Left Ear: Tympanic membrane normal.   Nose: Nose normal.   Mouth/Throat: Posterior oropharyngeal erythema (mild) present.   Neck: Normal range of motion.   Cardiovascular: Normal rate and regular rhythm.   Pulmonary/Chest: Effort normal and breath sounds normal.   Musculoskeletal: Normal range of motion.   Neurological: She is alert and oriented to person, place, and time.   Skin: Skin is warm.   Psychiatric: She has a normal mood and affect. Her behavior is normal.       Assessment:       1. Cough    2. PND (post-nasal drip)    3. Sinusitis, unspecified chronicity, unspecified location        Plan:         Cough  -     methylPREDNISolone (MEDROL DOSEPACK) 4 mg  tablet; use as directed  Dispense: 21 tablet; Refill: 0  -     promethazine-dextromethorphan (PROMETHAZINE-DM) 6.25-15 mg/5 mL Syrp; Take 5 mLs by mouth every 4 to 6 hours as needed (cough). Do not drive/operate heavy machinery while taking this medication.  Dispense: 118 mL; Refill: 0    PND (post-nasal drip)  -     methylPREDNISolone (MEDROL DOSEPACK) 4 mg tablet; use as directed  Dispense: 21 tablet; Refill: 0    Sinusitis, unspecified chronicity, unspecified location  -     methylPREDNISolone (MEDROL DOSEPACK) 4 mg tablet; use as directed  Dispense: 21 tablet; Refill: 0        Instructed to take all medications as ordered.  Informed if no improvement or symptoms worsens to follow up with primary care physician.  Informed to hydrate and rest.  Printed and review after visit summary with patient.

## 2020-05-16 NOTE — PROGRESS NOTES
[place] [facility] - [facility]    Progress Note    Wilfredo Torres Patient Status:  Inpatient    1935 MRN [id number]   Location [facility] 5SW/SE Attending Sang Newton MD   Hosp Day # 1 PCP Yvonne Dai MD       SUBJECTIVE:    Lying in be View) (cpt=72170)    Result Date: 5/14/2020  CONCLUSION: Normal examination.      Dictated by (CST): Lili Garcia MD on 5/14/2020 at 10:20 PM     Finalized by (CST): Vida Warren MD on 5/14/2020 at 10:21 PM            Meds:   multivita Q24H          Assessment  Patient Active Problem List:     Anemia     Chronic airway obstruction (HCC)     Cardiovascular disease     Essential hypertension     Hyperlipidemia     Congestive heart failure (HCC)     CKD (chronic kidney disease) stage 3, GFR subacute rehab although family refusing - they are agreeable to home health    Protein calorie malnutrition  - dietician eval     Acute on Chronic anemia  No signs of obvious bleeding, transfuse 1 unit of prbc    Prophylaxis  Subcutaneous heparin     CODE

## 2020-05-16 NOTE — PHYSICAL THERAPY NOTE
Per chart, pt is bed bound at baseline. PT contacted dtr Debbie Adair to obtain information on pt's functional status. Per dtr, pt has been bed bound since Jan d/t severe arthritis pain, does not leave the house except for doctor's appointments.  She has assist fo

## 2020-05-16 NOTE — PLAN OF CARE
Problem: Patient Centered Care  Goal: Patient preferences are identified and integrated in the patient's plan of care  Description  Interventions:  - What would you like us to know as we care for you?  Patient here from home for worsening sacral bed sore cultures  -IV fluids, IV antibiotics   Outcome: Progressing     Problem: SAFETY ADULT - FALL  Goal: Free from fall injury  Description  INTERVENTIONS:  - Assess pt frequently for physical needs  - Identify cognitive and physical deficits and behaviors that

## 2020-05-17 PROCEDURE — 99233 SBSQ HOSP IP/OBS HIGH 50: CPT | Performed by: HOSPITALIST

## 2020-05-17 RX ORDER — CARVEDILOL 3.12 MG/1
3.12 TABLET ORAL 2 TIMES DAILY WITH MEALS
Status: DISCONTINUED | OUTPATIENT
Start: 2020-05-17 | End: 2020-05-21

## 2020-05-17 RX ORDER — DEXTROSE AND SODIUM CHLORIDE 5; .9 G/100ML; G/100ML
INJECTION, SOLUTION INTRAVENOUS CONTINUOUS
Status: DISCONTINUED | OUTPATIENT
Start: 2020-05-17 | End: 2020-05-21

## 2020-05-17 NOTE — OCCUPATIONAL THERAPY NOTE
Occupational therapy orders received, chart review complete, per EMR pt is bed bound at baseline, has assist for ADLs and dghtr does not want rehab/SNF at discharge, plan for HHPT, nursing, DME recommendations in place and 24hr care.  Pt does not have any s

## 2020-05-17 NOTE — PROGRESS NOTES
[facility] dosing service    Follow-up Pharmacokinetic Consult for Vancomycin Dosing     Maged Emanuel is a 82year old female who is being treated for cellulitis and sepsis.    Patient is on day 3 of Vancomycin and is currently receiving 750 mg IV Q 24

## 2020-05-17 NOTE — PROGRESS NOTES
[facility]    Progress Note    Robert Coleman Patient Status:  Inpatient    1935 MRN [id number]   Location [facility] 5SW/SE Attending Emerald Hardy MD   Hosp Day # 2 PCP Christiano Hu MD       SUBJECTIVE:    Lying in be 10:22 PM          Xr Pelvis (1 View) (cpt=72170)    Result Date: 5/14/2020  CONCLUSION: Normal examination.      Dictated by (CST): Torie Fountain MD on 5/14/2020 at 10:20 PM     Finalized by (CST): James Warren MD on 5/14/2020 at 10:21 Problem List:     Anemia     Chronic airway obstruction (HCC)     Cardiovascular disease     Essential hypertension     Hyperlipidemia     Congestive heart failure (HCC)     CKD (chronic kidney disease) stage 3, GFR 30-59 ml/min ([facility])     Chronic kidney di will likely require subacute rehab although family refusing - they are agreeable to home health    Protein calorie malnutrition  - dietician yehuda     Acute on Chronic anemia  No signs of obvious bleeding, but Hgb trending down  s/p 1 unit of prbc on 5/16 w

## 2020-05-17 NOTE — PLAN OF CARE
Pt denies pain, sob, chest pain, n/v. Medication reviewed. Call light in reach. Pt refused PO medications. Bed in low position. Safety maintained. Pt turned every 2 hours. We will continue to monitor.   Problem: Patient Centered Care  Goal: Patient preferen SKIN/TISSUE INTEGRITY - ADULT  Goal: Skin integrity remains intact  Description  INTERVENTIONS  - Assess and document risk factors for pressure ulcer development  - Assess and document skin integrity  - Monitor for areas of redness and/or skin breakdown  -

## 2020-05-18 PROCEDURE — 99233 SBSQ HOSP IP/OBS HIGH 50: CPT | Performed by: HOSPITALIST

## 2020-05-18 NOTE — PROGRESS NOTES
[facility]    Progress Note    Torito Henry Patient Status:  Inpatient    1935 MRN [id number]   Location [facility] 5SW/SE Attending Delia Cobos MD   Hosp Day # 3 PCP Derian Quarles MD       SUBJECTIVE:    Lying in be Nightly  Donepezil HCl (ARICEPT) tab 5 mg, 5 mg, Oral, Nightly  collagenase (SANTYL) 250 UNIT/GM ointment, , Topical, Daily  traMADol HCl (ULTRAM) tab 50 mg, 50 mg, Oral, Q12H PRN  glucose (DEX4) oral liquid 15 g, 15 g, Oral, Q15 Min PRN    Or  Glucose-Vit Acute renal failure superimposed on chronic kidney disease (HCC)     Acute renal failure superimposed on chronic kidney disease, unspecified CKD stage, unspecified acute renal failure type (HCC)     Leukocytosis, unspecified type     Anemia, unspecified ty pending     D/w patient's daughter Milan Nunez plan of care and status. Daughter is open to an info session with hospice. All questions answered.        ADDENDUM    I've seen and examined the pt together with Ayanna and RN, wounds examined, the above note revie

## 2020-05-18 NOTE — PLAN OF CARE
Problem: Patient Centered Care  Goal: Patient preferences are identified and integrated in the patient's plan of care  Description  Interventions:  - What would you like us to know as we care for you?  Patient here from home for worsening sacral bed sore

## 2020-05-18 NOTE — CM/SW NOTE
Hospice Team received Order for informational.  MSW spoke to pts dtr, Valencia Ayers she is agreeable to receive information on Hospice tomorrow and she will be available at [address] [place] [address] til 5pm and declined hospice information over the phone.   Hospice Tea

## 2020-05-18 NOTE — CM/SW NOTE
YONI Received for Hospice information session. Referral made via allscripts to [facility] and discussed with in-house liaison/Kierra [id number].     [facility], [facility], [address] [facility], [address]

## 2020-05-19 PROCEDURE — 99233 SBSQ HOSP IP/OBS HIGH 50: CPT | Performed by: HOSPITALIST

## 2020-05-19 RX ORDER — VANCOMYCIN HYDROCHLORIDE 125 MG/1
125 CAPSULE ORAL DAILY
Status: DISCONTINUED | OUTPATIENT
Start: 2020-05-19 | End: 2020-05-21

## 2020-05-19 NOTE — PROGRESS NOTES
[facility]    Progress Note    Emmanuel King Patient Status:  Inpatient    1935 MRN [id number]   Location [facility] 5SW/SE Attending Lise Cornell MD   Hosp Day # 4 PCP Derek Dietrich MD       SUBJECTIVE:    Lying in be drop, 1 drop, Both Eyes, BID  atorvastatin (LIPITOR) tab 20 mg, 20 mg, Oral, Nightly  Donepezil HCl (ARICEPT) tab 5 mg, 5 mg, Oral, Nightly  collagenase (SANTYL) 250 UNIT/GM ointment, , Topical, Daily  traMADol HCl (ULTRAM) tab 50 mg, 50 mg, Oral, Q12H PRN chronic kidney disease ([facility] [address]     Acute renal failure superimposed on chronic kidney disease, unspecified CKD stage, unspecified acute renal failure type (HCC)     Leukocytosis, unspecified type     Anemia, unspecified type     Hyperkalemia     Febrile illn meeting today at 5:30pm.     ADDENDUM    I've seen and examined the pt independently, the above note reviewed and agree with    D/w ADY Holguin MD

## 2020-05-19 NOTE — CM/SW NOTE
MSW left  for Dtr, Sandra Moe to advise Hospice Team member will meet with her today at 36pm in hospital lobby to provide hospice information.

## 2020-05-19 NOTE — PROGRESS NOTES
[facility] dosing service    Follow-up Pharmacokinetic Consult for Vancomycin Dosing     Tom Whitaker is a 82year old female who is being treated for cellulitis and sepsis.    Patient is on day 6 of Vancomycin and is currently receiving 750 mg IV Q 24

## 2020-05-19 NOTE — PLAN OF CARE
Problem: Patient Centered Care  Goal: Patient preferences are identified and integrated in the patient's plan of care  Description  Interventions:  - What would you like us to know as we care for you?  Patient here from home for worsening sacral bed sore integrity  - Monitor for areas of redness and/or skin breakdown  - Initiate interventions, skin care algorithm/standards of care as needed  Outcome: Progressing     Problem: RISK FOR INFECTION - ADULT  Goal: Absence of fever/infection during anticipated ne pharmacy to review patient's medication profile  Outcome: Not Progressing  Goal: Maintains adequate nutritional intake (undernourished)  Description  INTERVENTIONS:  - Monitor percentage of each meal consumed  - Identify factors contributing to decreased i

## 2020-05-19 NOTE — PLAN OF CARE
Problem: Patient Centered Care  Goal: Patient preferences are identified and integrated in the patient's plan of care  Description  Interventions:  - What would you like us to know as we care for you?  Patient here from home for worsening sacral bed sore integrity  - Monitor for areas of redness and/or skin breakdown  - Initiate interventions, skin care algorithm/standards of care as needed  Outcome: Progressing     Problem: RISK FOR INFECTION - ADULT  Goal: Absence of fever/infection during anticipated ne pharmacy to review patient's medication profile  Outcome: Progressing  Goal: Maintains adequate nutritional intake (undernourished)  Description  INTERVENTIONS:  - Monitor percentage of each meal consumed  - Identify factors contributing to decreased intak

## 2020-05-20 PROCEDURE — 99232 SBSQ HOSP IP/OBS MODERATE 35: CPT | Performed by: HOSPITALIST

## 2020-05-20 RX ORDER — CARVEDILOL 3.12 MG/1
3.12 TABLET ORAL 2 TIMES DAILY WITH MEALS
Qty: 60 TABLET | Refills: 0 | Status: SHIPPED | OUTPATIENT
Start: 2020-05-20 | End: 2020-05-21

## 2020-05-20 RX ORDER — POTASSIUM CHLORIDE 14.9 MG/ML
20 INJECTION INTRAVENOUS ONCE
Status: COMPLETED | OUTPATIENT
Start: 2020-05-20 | End: 2020-05-20

## 2020-05-20 NOTE — HOSPICE RN NOTE
[facility] waiting to get signed consents back from daughter today. Spoke with daughter this morning and she is reading the consents over and will sign. We will order DME and Meds for delivery as soon as Consents are signed. POC discussed with [facility]

## 2020-05-20 NOTE — CM/SW NOTE
SW informed by Hospice liaison that the family has signed consents for hospice at home. Plan is for dc tomorrow 5/21 to home around 5pm as family needs time to prepare the home. [facility] lia[facility] will coordinate transport. CHELSI PAYAN/Kizzy lambert.

## 2020-05-20 NOTE — PLAN OF CARE
Problem: Patient Centered Care  Goal: Patient preferences are identified and integrated in the patient's plan of care  Description  Interventions:  - What would you like us to know as we care for you?  Patient here from home for worsening sacral bed sore integrity  - Monitor for areas of redness and/or skin breakdown  - Initiate interventions, skin care algorithm/standards of care as needed  Outcome: Progressing     Problem: GASTROINTESTINAL - ADULT  Goal: Maintains or returns to baseline bowel function  D Encourage toileting schedule  Outcome: Progressing     Problem: Delirium  Goal: Minimize duration of delirium  Description  Interventions:  - Encourage use of hearing aids, eye glasses  - Promote highest level of mobility daily  - Provide frequent reorient

## 2020-05-20 NOTE — PROGRESS NOTES
[facility]    Progress Note    Filiberto Vivas Patient Status:  Inpatient    1935 MRN [id number]   Location [facility] 5SW/SE Attending Ketan De La Rosa MD   Hosp Day # 5 PCP Leta Ramirez MD           Attending Addendum sig 05/20/2020    CA 8.4 05/20/2020    MG 2.7 05/19/2020         Imaging: Reviewed          Meds:   vancomycin HCl (VANCOCIN) cap 125 mg, 125 mg, Oral, Daily  Vancomycin HCl (VANCOCIN) 1 mg--Pharmacy to dose intermittently due to high trough, 1 mg, Intravenous airway obstruction [facility])     Cardiovascular disease     Essential hypertension     Hyperlipidemia     Congestive heart failure (HCC)     CKD (chronic kidney disease) stage 3, GFR 30-59 ml/min ([facility])     Chronic kidney disease, stage III (moderate) ([facility]) likely require subacute rehab although family refusing - they are agreeable to home health    Protein calorie malnutrition  - dietician yehuda Todd d/w patient's daughter who states her mother would not want a feeding tube.       Acute on Chronic anemia  No sig

## 2020-05-21 ENCOUNTER — HOSPITAL ENCOUNTER (INPATIENT)
Facility: HOSPITAL | Age: 85
LOS: 1 days | Discharge: HOME | End: 2020-05-21
Attending: HOSPITALIST | Admitting: HOSPITALIST
Payer: OTHER MISCELLANEOUS

## 2020-05-21 VITALS
SYSTOLIC BLOOD PRESSURE: 107 MMHG | HEART RATE: 78 BPM | RESPIRATION RATE: 20 BRPM | OXYGEN SATURATION: 100 % | DIASTOLIC BLOOD PRESSURE: 55 MMHG | WEIGHT: 108.19 LBS | HEIGHT: 60.98 IN | BODY MASS INDEX: 20.42 KG/M2 | TEMPERATURE: 98 F

## 2020-05-21 VITALS
DIASTOLIC BLOOD PRESSURE: 57 MMHG | OXYGEN SATURATION: 100 % | HEART RATE: 75 BPM | SYSTOLIC BLOOD PRESSURE: 109 MMHG | RESPIRATION RATE: 18 BRPM

## 2020-05-21 PROBLEM — F03.90 DEMENTIA (HCC): Status: ACTIVE | Noted: 2020-05-21

## 2020-05-21 PROCEDURE — 99239 HOSP IP/OBS DSCHRG MGMT >30: CPT | Performed by: HOSPITALIST

## 2020-05-21 RX ORDER — BISACODYL 10 MG
10 SUPPOSITORY, RECTAL RECTAL
Status: DISCONTINUED | OUTPATIENT
Start: 2020-05-21 | End: 2020-05-21

## 2020-05-21 RX ORDER — SODIUM CHLORIDE 0.9 % (FLUSH) 0.9 %
10 SYRINGE (ML) INJECTION AS NEEDED
Status: DISCONTINUED | OUTPATIENT
Start: 2020-05-21 | End: 2020-05-21

## 2020-05-21 RX ORDER — FUROSEMIDE 40 MG/1
40 TABLET ORAL EVERY 8 HOURS PRN
Status: DISCONTINUED | OUTPATIENT
Start: 2020-05-21 | End: 2020-05-21

## 2020-05-21 RX ORDER — ONDANSETRON 2 MG/ML
4 INJECTION INTRAMUSCULAR; INTRAVENOUS EVERY 6 HOURS PRN
Status: DISCONTINUED | OUTPATIENT
Start: 2020-05-21 | End: 2020-05-21

## 2020-05-21 RX ORDER — LORAZEPAM 2 MG/ML
2 INJECTION INTRAMUSCULAR EVERY 4 HOURS PRN
Status: DISCONTINUED | OUTPATIENT
Start: 2020-05-21 | End: 2020-05-21

## 2020-05-21 RX ORDER — FUROSEMIDE 10 MG/ML
40 INJECTION INTRAMUSCULAR; INTRAVENOUS EVERY 8 HOURS PRN
Status: DISCONTINUED | OUTPATIENT
Start: 2020-05-21 | End: 2020-05-21

## 2020-05-21 RX ORDER — DEXTROSE AND SODIUM CHLORIDE 5; .9 G/100ML; G/100ML
INJECTION, SOLUTION INTRAVENOUS CONTINUOUS
Status: CANCELLED | OUTPATIENT
Start: 2020-05-21

## 2020-05-21 RX ORDER — ATROPINE SULFATE 10 MG/ML
2 SOLUTION/ DROPS OPHTHALMIC EVERY 2 HOUR PRN
Status: DISCONTINUED | OUTPATIENT
Start: 2020-05-21 | End: 2020-05-21

## 2020-05-21 RX ORDER — ONDANSETRON 4 MG/1
4 TABLET, ORALLY DISINTEGRATING ORAL EVERY 6 HOURS PRN
Status: DISCONTINUED | OUTPATIENT
Start: 2020-05-21 | End: 2020-05-21

## 2020-05-21 RX ORDER — MORPHINE SULFATE 20 MG/ML
10 SOLUTION ORAL EVERY 2 HOUR PRN
Status: DISCONTINUED | OUTPATIENT
Start: 2020-05-21 | End: 2020-05-21

## 2020-05-21 RX ORDER — SCOLOPAMINE TRANSDERMAL SYSTEM 1 MG/1
1 PATCH, EXTENDED RELEASE TRANSDERMAL
Status: DISCONTINUED | OUTPATIENT
Start: 2020-05-21 | End: 2020-05-21

## 2020-05-21 RX ORDER — MORPHINE SULFATE 2 MG/ML
1 INJECTION, SOLUTION INTRAMUSCULAR; INTRAVENOUS
Status: DISCONTINUED | OUTPATIENT
Start: 2020-05-21 | End: 2020-05-21

## 2020-05-21 RX ORDER — LORAZEPAM 2 MG/ML
1 INJECTION INTRAMUSCULAR EVERY 4 HOURS PRN
Status: DISCONTINUED | OUTPATIENT
Start: 2020-05-21 | End: 2020-05-21

## 2020-05-21 RX ORDER — DEXTROSE AND SODIUM CHLORIDE 5; .9 G/100ML; G/100ML
INJECTION, SOLUTION INTRAVENOUS CONTINUOUS
Status: DISCONTINUED | OUTPATIENT
Start: 2020-05-21 | End: 2020-05-21

## 2020-05-21 RX ORDER — LORAZEPAM 2 MG/ML
0.5 INJECTION INTRAMUSCULAR EVERY 4 HOURS PRN
Status: DISCONTINUED | OUTPATIENT
Start: 2020-05-21 | End: 2020-05-21

## 2020-05-21 RX ORDER — HALOPERIDOL 5 MG/ML
2 INJECTION INTRAMUSCULAR
Status: DISCONTINUED | OUTPATIENT
Start: 2020-05-21 | End: 2020-05-21

## 2020-05-21 RX ORDER — HALOPERIDOL 5 MG/ML
1 INJECTION INTRAMUSCULAR
Status: DISCONTINUED | OUTPATIENT
Start: 2020-05-21 | End: 2020-05-21

## 2020-05-21 RX ORDER — MORPHINE SULFATE 2 MG/ML
1 INJECTION, SOLUTION INTRAMUSCULAR; INTRAVENOUS EVERY 4 HOURS PRN
Status: DISCONTINUED | OUTPATIENT
Start: 2020-05-21 | End: 2020-05-21

## 2020-05-21 RX ORDER — GLYCOPYRROLATE 0.2 MG/ML
0.4 INJECTION, SOLUTION INTRAMUSCULAR; INTRAVENOUS
Status: DISCONTINUED | OUTPATIENT
Start: 2020-05-21 | End: 2020-05-21

## 2020-05-21 RX ORDER — MORPHINE SULFATE 2 MG/ML
1 INJECTION, SOLUTION INTRAMUSCULAR; INTRAVENOUS EVERY 4 HOURS PRN
Status: CANCELLED | OUTPATIENT
Start: 2020-05-21

## 2020-05-21 RX ORDER — ONDANSETRON 2 MG/ML
4 INJECTION INTRAMUSCULAR; INTRAVENOUS EVERY 6 HOURS PRN
Status: CANCELLED | OUTPATIENT
Start: 2020-05-21

## 2020-05-21 NOTE — PROGRESS NOTES
Per Hospice nurse patient seem different from this am. Patient has labored breathing which is new, pale and cool skin was noted this am. Dr. Amina Royal was called for orders due to change in status prior to discharge. Orders was given and will be carried out.  Pa

## 2020-05-21 NOTE — WOUND PROGRESS NOTE
Wound Care Services  Follow up on the pt's sacrum, buttock cheek and heel ulcers, the pt.  is on a 1st step Overlay air mattress, spoke with the pt's nurse and the pt. will discharge to home with hospice, the pt's nurse would like to change the dressing bef

## 2020-05-21 NOTE — HOSPICE RN NOTE
Pt had a change in her breathing prior to discharge. Pt RR is 28 breaths/minute and labored, pt is using her accessory muscles to breathe. Paged Dr. Archana Coffman, he is ok with pt being GIP hospice.  Called Dr. Abdiel Bose and spoke with him regarding POC for pt, h

## 2020-05-21 NOTE — DISCHARGE SUMMARY
[facility] HOSPITALIST  DISCHARGE SUMMARY     James Vieyra Patient Status:  Inpatient    1935 MRN [id number]   Location [facility] 5SW/SE Attending Alfredito Peña MD   Hosp Day # 6 PCP Jose Caraballo MD     DATE OF ADMISSION: 2020  DATE (101-108)/(55-63) 103/63  Gen: A+Ox3. No distress. HEENT: NCAT, neck supple, no carotid bruit. CV: RRR, S1S2, and intact distal pulses. No gallop, rub, murmur. Pulm: Effort and breath sounds normal. No distress, wheezes, rales, rhonchi.   Abd: Soft, NT HCl 5 MG Tabs  Commonly known as:  ARICEPT      Take 1 tablet (5 mg total) by mouth nightly.    Quantity:  30 tablet  Refills:  0     Dorzolamide HCl-Timolol Mal 22.3-6.8 MG/ML Soln  Commonly known as:  Dorzolamide-Timolol      Place 1 drop into both eyes 2 understand to return to the emergency room for any concerning signs or symptoms.   Greater than 30 minutes spent on discharge.  -----------------------    Hospital Discharge Diagnoses: Sacral decub and right heel ulcer with associated cellulitis and sepsis

## 2020-05-21 NOTE — HOSPICE RN NOTE
[facility] to admit this patient this afternoon at her home in [facility]. DME and Medications delivered to the home. [address] is coming at 445pm today to transport her to home. POC was discussed with Dr Carrie Peters and Sherri Sauceda RN. DNR signed by

## 2020-05-22 NOTE — PROGRESS NOTES
Called to room by RN due to upset family in room. I came to room and met with daughter, Sixto Valencia and granddaughter. Megha yelling and demanding that patient's HOSPICE status \"gets revoked and I will take her home.  All you do is give people morphine and pus

## 2020-05-22 NOTE — PROGRESS NOTES
Called [facility]. To take patient home by ambulance at [place] [address]. [facility] [address] [facility]. [address]. Estimated ETA ~45 mins. Family updated with plan of care.

## 2020-05-22 NOTE — PLAN OF CARE
Family at bedside at beginning of shift. Patient belongings taken home by family. IV removed. Ambulance personnel arrived at 2115 in room with patient. Family given discharge instructions, questions answered.

## 2021-01-06 NOTE — PLAN OF CARE
Problem: Patient Centered Care  Goal: Patient preferences are identified and integrated in the patient's plan of care  Description  Interventions:  - What would you like us to know as we care for you?  Patient here from home for worsening sacral bed sore integrity  - Monitor for areas of redness and/or skin breakdown  - Initiate interventions, skin care algorithm/standards of care as needed  Outcome: Progressing     Problem: RISK FOR INFECTION - ADULT  Goal: Absence of fever/infection during anticipated ne (1) Oriented to own ability

## 2021-01-25 DIAGNOSIS — Z23 NEED FOR VACCINATION: ICD-10-CM

## 2021-05-13 NOTE — PLAN OF CARE
Pt denies PO medication. Pt denies pain, sob, chest pain, n/v. Medication reviewed. Safety maintained. Call light in reach. Bed in low position.  We will continue to monitor  Problem: Patient Centered Care  Goal: Patient preferences are identified and integ ADULT  Goal: Skin integrity remains intact  Description  INTERVENTIONS  - Assess and document risk factors for pressure ulcer development  - Assess and document skin integrity  - Monitor for areas of redness and/or skin breakdown  - Initiate interventions, stretcher

## 2021-06-07 NOTE — PHYSICAL THERAPY NOTE
PHYSICAL THERAPY TREATMENT NOTE - INPATIENT    Room Number: 278/918-K       Presenting Problem: R sided abdominal pain    Problem List  Principal Problem:    Right sided abdominal pain      ASSESSMENT   Stated that pain is relieved with the medical interv currently have. ..  -   Turning over in bed (including adjusting bedclothes, sheets and blankets)?: None   -   Sitting down on and standing up from a chair with arms (e.g., wheelchair, bedside commode, etc.): A Little   -   Moving from lying on back to sitt there ex LAQ and attempted hip marchers   Goal #6    Goal #6  Current Status READING GLASSES

## 2021-07-12 NOTE — PLAN OF CARE
Problem: Impaired Functional Mobility  Goal: Achieve highest/safest level of mobility/gait  Description  Interventions:  - Assess patient's functional ability and stability  - Promote increasing activity/tolerance for mobility and gait  - Educate and eng No

## 2021-12-10 NOTE — ED INITIAL ASSESSMENT (HPI)
Via EMS From home, daughter reports she is having a CHF exacerbation because her BLE and knees are swollen. Denies SOB.  Patients daughter doesn't think she is taking her meds properly Started first trimester

## (undated) DEVICE — SUCTION CANISTER, 3000CC,SAFELINER: Brand: DEROYAL

## (undated) DEVICE — STERILE LATEX POWDER-FREE SURGICAL GLOVESWITH NITRILE COATING: Brand: PROTEXIS

## (undated) DEVICE — VAGINAL PACKING: Brand: DEROYAL

## (undated) DEVICE — ENCORE® LATEX ACCLAIM SIZE 7.5, STERILE LATEX POWDER-FREE SURGICAL GLOVE: Brand: ENCORE

## (undated) DEVICE — SOL  .9 1000ML BTL

## (undated) DEVICE — HEAD & NECK: Brand: MEDLINE INDUSTRIES, INC.

## (undated) DEVICE — PERIFIX® 18 GA. X 3-1/2 IN. (90 MM) TUOHY, PERIFIX 20 GA. CLOSED TIP CATHETER, 5 ML GLASS LUER LOCK LOR TRAY (KIT): Brand: PERIFIX®

## (undated) DEVICE — USE ITEM #176901

## (undated) NOTE — LETTER
[address], [place]  Authorization for Invasive Procedures  1.  I hereby authorize Dr. Hussein Wilder , my physician and whomever may be designated as the doctor's assistant, to perform the following operation and/or procedure:  Right Hea performed for the purposes of advancing medicine, science, and/or education, provided my identity is not revealed. If the procedure has been videotaped, the physician/surgeon will obtain the original videotape.  The hospital will not be responsible for stor My signature below affirms that prior to the time of the procedure, I have explained to the patient and/or her legal representative, the risks and benefits involved in the proposed treatment and any reasonable alternative to the proposed treatment.  I have

## (undated) NOTE — ED AVS SNAPSHOT
Felix House   MRN: [id number]    Department:  [facility] Emergency Department   Date of Visit:  12/17/2018           Disclosure     Insurance plans vary and the physician(s) referred by the ER may not be covered by your plan.  Please contact within the next three months to obtain basic health screening including reassessment of your blood pressure.     IF THERE IS ANY CHANGE OR WORSENING OF YOUR CONDITION, CALL YOUR PRIMARY CARE PHYSICIAN AT ONCE OR RETURN IMMEDIATELY TO THE EMERGENCY DEPARTMEN

## (undated) NOTE — ED AVS SNAPSHOT
[facility] Emergency Department    [address]     [address] [place] [address]    Phone:  [phone]    Fax:  Branden Baron   MRN: [id number]    Department:  [facility] Emergency Department   Date of Visit:  3/24/ and Class Registration line at [phone] or find a doctor online by visiting [email or web address].    IF THERE IS ANY CHANGE OR WORSENING OF YOUR CONDITION, CALL YOUR PRIMARY CARE PHYSICIAN AT ONCE OR RETURN IMMEDIATELY TO [address] [place] [address].     If

## (undated) NOTE — IP AVS SNAPSHOT
[facility]            (For Outpatient Use Only) Initial Admit Date: 1/31/2020   Inpt/Obs Admit Date: Inpt: 1/31/20 / Obs: N/A   Discharge Date:    Gwendolyn Copier:  [de-identified]   MRN: [de-identified]   CSN: [id number]   CEID: [id number]        CRF Subscriber ID:  Pt Rel to Subscriber:    Hospital Account Financial Class: Medicare    February 7, 2020

## (undated) NOTE — LETTER
Hospital Discharge Documentation  Please phone to schedule a hospital follow up appointment.     From: [address] Hospitalist's Office  Phone: [phone]    Patient discharged time/date: 4/16/2020  5:34 PM  Patient discharge disposition:  Home or Self Confusion     Confusion and disorientation     Acute on chronic congestive heart failure, unspecified heart failure type (HCC)     Back pain, lumbosacral     Acute renal failure superimposed on chronic kidney disease ([facility] [address]     Acute renal failure superi IV ancef for now- dc on oral abx per ID  ID following  Respiratory panel negative  COVID 19 testing neg  Blood cultures pending, no growth in 3 days  CXR stable, no acute issues  UA not suspicious for UTI  SLP working with patient  Asked OMF to see patient Refills:  0     collagenase 250 UNIT/GM Oint  Commonly known as:  SANTYL      Topical, Every day to right medial heel   Quantity:  1 Tube  Refills:  0     nystatin 775999 UNIT/ML Susp  Commonly known as:  MYCOSTATIN      Take 5 mL (500,000 Units total) by Take 40 mg by mouth nightly. Refills:  0     SITagliptin Phosphate 25 MG Tabs  Commonly known as:  JANUVIA      Take 25 mg by mouth daily. Refills:  0     T.E.D.  Below Knee/Small Misc      Put on in the am, remove in pm   Quantity:  1 each  Refills: Attribution Key     CL. 1 - Candance Lewis, MD on 4/16/2020 10:28 PM   VS.1 - BRUNO Degroot on 4/16/2020  2:50 PM

## (undated) NOTE — IP AVS SNAPSHOT
[facility]            (For Outpatient Use Only) Initial Admit Date: 5/14/2020   Inpt/Obs Admit Date: Inpt: 5/15/20 / Obs: N/A   Discharge Date:    Adán Clark:  [de-identified]   MRN: [de-identified]   CSN: [id number]   CEID: [id number]        N Subscriber Name:  Benito Jung :    Subscriber ID:  Pt Rel to Subscriber:    Hospital Account Financial Class: Medicare    May 21, 2020

## (undated) NOTE — ED AVS SNAPSHOT
[facility] Emergency Department    [address]     [address] [place] [address]    Phone:  [phone]    Fax:  Branden Baron   MRN: [id number]    Department:  [facility] Emergency Department   Date of Visit:  1/12/ and Class Registration line at [phone] or find a doctor online by visiting [email or web address].    IF THERE IS ANY CHANGE OR WORSENING OF YOUR CONDITION, CALL YOUR PRIMARY CARE PHYSICIAN AT ONCE OR RETURN IMMEDIATELY TO [address] [place] [address].     If

## (undated) NOTE — LETTER
[address], [place], [place]     AUTHORIZATION FOR SURGICAL OPERATION OR PROCEDURE    1.  I hereby authorize Dr. Pineda Pay my Physician(s) and whomever may be designated as the doctor's Assistant, to perform the follow 5. I consent to the photographing of procedure(s) to be performed for the purposes of advancing medicine, science and/or education, provided my identity is not revealed.  If the procedure has been videotaped, the physician/surgeon will obtain the original v (Witness signature)                                                                                                  (Date)                                (Time)  STATEMENT OF PHYSICIAN My signature below affirms that prior to the time of the procedure;  I

## (undated) NOTE — IP AVS SNAPSHOT
[facility]            (For Outpatient Use Only) Initial Admit Date: 4/10/2020   Inpt/Obs Admit Date: Inpt: 4/10/20 / Obs: N/A   Discharge Date:    Anali Hunter:  [de-identified]   MRN: [de-identified]   CSN: [id number]   CEID: [id number]        [facility] Subscriber Name:  Raquel Gautam :    Subscriber ID:  Pt Rel to Subscriber:    Hospital Account Financial Class: Medicare    2020

## (undated) NOTE — LETTER
[address] [place] [address] Rd, [place], [place]     AUTHORIZATION FOR SURGICAL OPERATION OR PROCEDURE    1.  I hereby authorize Dr. Noah Bernheim, my Physician(s) and whomever may be designated as the doctor's Assistant, to perform the following o 5. I consent to the photographing of procedure(s) to be performed for the purposes of advancing medicine, science and/or education, provided my identity is not revealed.  If the procedure has been videotaped, the physician/surgeon will obtain the original v (Witness signature)                                                                                                  (Date)                                (Time)  STATEMENT OF PHYSICIAN My signature below affirms that prior to the time of the procedure;  I

## (undated) NOTE — LETTER
Hospital Discharge Documentation  Please phone to schedule a hospital follow up appointment.     From: [address] Hospitalist's Office  Phone: [phone]    Patient discharged time/date: 5/21/2020  9:50 PM  Patient discharge disposition:  Home or Self Patient was admitted, placed on antibiotics seen by wound care. She did have some degree of renal failure likely related to dehydration.   After numerous discussions with family regarding goals of care it was finally decided to this discharge the patient h Albuterol Sulfate  (90 Base) MCG/ACT Aers       Inhale 2 puffs into the lungs every 4 (four) hours as needed.    Refills:  0      allopurinol 100 MG Tabs  Commonly known as:  ZYLOPRIM       Take 1 tablet (100 mg total) by mouth daily.    Quantity: Commonly known as:  APRESOLINE         torsemide 20 MG Tabs  Commonly known as:  DEMADEX                             Where to Get Your Medications             These medications were sent to [address] [facility]  [address]  [phone]

## (undated) NOTE — ED AVS SNAPSHOT
[facility] Emergency Department    [address] [address]    Phone:  [phone]    Fax:  Branden Baron   MRN: [id number]    Department:  [facility] Emergency Department   Date of Visit:  1/12/ GOUT ATTACKS, TREATING (ENGLISH)      Disclosure     Insurance plans vary and the physician(s) referred by the ER may not be covered by your plan.  Please contact your insurance company to determine coverage and benefits available for follow-up care and re If you have been prescribed any medication(s), please fill your prescription right away and begin taking the medication(s) as directed.   If you believe that any of the medications or instructions on this list is different from what your Primary Care doctor coverage. Patient [address] is a Federal Navigator program that can help with your Affordable Care Act coverage, as well as all types of Medicaid plans.   To get signed up and covered, please call [phone] and ask to get set up for an insuran

## (undated) NOTE — ED AVS SNAPSHOT
[facility] Emergency Department    [address]     [address] [place] [address]    Phone:  [phone]    Fax:  Branden Baron   MRN: [id number]    Department:  [facility] Emergency Department   Date of Visit:  3/24/ Si tiene problemas para programar forrest marii de seguimiento según lo indicado, llame al encargado de hetal al [phone]. It is our goal to assure that you are completely satisfied with every aspect of your visit today.   In an effort to constantly impr list to your next doctor's appointment. Any imaging studies and labs completed today can be reviewed in your MyChart account. You may have had testing done that requires us to contact you. Please make sure we have your correct phone number on file. Sign up for [employer], your secure online medical record. [employer] will allow you to access patient instructions from your recent visit,  view other health information, and more. To sign up or find more information, go to [email or web address] cl